# Patient Record
Sex: FEMALE | Race: WHITE | NOT HISPANIC OR LATINO | Employment: FULL TIME
[De-identification: names, ages, dates, MRNs, and addresses within clinical notes are randomized per-mention and may not be internally consistent; named-entity substitution may affect disease eponyms.]

---

## 2017-09-17 ENCOUNTER — HEALTH MAINTENANCE LETTER (OUTPATIENT)
Age: 52
End: 2017-09-17

## 2019-11-08 ENCOUNTER — HEALTH MAINTENANCE LETTER (OUTPATIENT)
Age: 54
End: 2019-11-08

## 2020-02-23 ENCOUNTER — HEALTH MAINTENANCE LETTER (OUTPATIENT)
Age: 55
End: 2020-02-23

## 2020-12-06 ENCOUNTER — HEALTH MAINTENANCE LETTER (OUTPATIENT)
Age: 55
End: 2020-12-06

## 2021-02-20 ENCOUNTER — HEALTH MAINTENANCE LETTER (OUTPATIENT)
Age: 56
End: 2021-02-20

## 2021-09-25 ENCOUNTER — HEALTH MAINTENANCE LETTER (OUTPATIENT)
Age: 56
End: 2021-09-25

## 2022-01-15 ENCOUNTER — HEALTH MAINTENANCE LETTER (OUTPATIENT)
Age: 57
End: 2022-01-15

## 2022-09-29 ENCOUNTER — ANESTHESIA EVENT (OUTPATIENT)
Dept: SURGERY | Facility: CLINIC | Age: 57
End: 2022-09-29
Payer: COMMERCIAL

## 2022-09-29 ENCOUNTER — ANESTHESIA (OUTPATIENT)
Dept: SURGERY | Facility: CLINIC | Age: 57
End: 2022-09-29
Payer: COMMERCIAL

## 2022-09-29 ENCOUNTER — APPOINTMENT (OUTPATIENT)
Dept: GENERAL RADIOLOGY | Facility: CLINIC | Age: 57
End: 2022-09-29
Attending: ORTHOPAEDIC SURGERY
Payer: COMMERCIAL

## 2022-09-29 ENCOUNTER — APPOINTMENT (OUTPATIENT)
Dept: GENERAL RADIOLOGY | Facility: CLINIC | Age: 57
End: 2022-09-29
Attending: EMERGENCY MEDICINE
Payer: COMMERCIAL

## 2022-09-29 ENCOUNTER — HOSPITAL ENCOUNTER (OUTPATIENT)
Facility: CLINIC | Age: 57
Setting detail: OBSERVATION
Discharge: HOME | End: 2022-09-30
Attending: EMERGENCY MEDICINE | Admitting: ORTHOPAEDIC SURGERY
Payer: COMMERCIAL

## 2022-09-29 ENCOUNTER — APPOINTMENT (OUTPATIENT)
Dept: CARDIOLOGY | Facility: CLINIC | Age: 57
End: 2022-09-29
Attending: PHYSICIAN ASSISTANT
Payer: COMMERCIAL

## 2022-09-29 ENCOUNTER — APPOINTMENT (OUTPATIENT)
Dept: CT IMAGING | Facility: CLINIC | Age: 57
End: 2022-09-29
Attending: PHYSICIAN ASSISTANT
Payer: COMMERCIAL

## 2022-09-29 DIAGNOSIS — E87.1 HYPONATREMIA: Primary | ICD-10-CM

## 2022-09-29 DIAGNOSIS — S82.401A TIBIA/FIBULA FRACTURE, RIGHT, CLOSED, INITIAL ENCOUNTER: ICD-10-CM

## 2022-09-29 DIAGNOSIS — S82.201A TIBIA/FIBULA FRACTURE, RIGHT, CLOSED, INITIAL ENCOUNTER: ICD-10-CM

## 2022-09-29 LAB
ANION GAP SERPL CALCULATED.3IONS-SCNC: 9 MMOL/L (ref 3–14)
BASOPHILS # BLD AUTO: 0 10E3/UL (ref 0–0.2)
BASOPHILS NFR BLD AUTO: 0 %
BUN SERPL-MCNC: 7 MG/DL (ref 7–30)
CALCIUM SERPL-MCNC: 8.6 MG/DL (ref 8.5–10.1)
CHLORIDE BLD-SCNC: 99 MMOL/L (ref 94–109)
CO2 SERPL-SCNC: 22 MMOL/L (ref 20–32)
CREAT SERPL-MCNC: 0.5 MG/DL (ref 0.52–1.04)
EOSINOPHIL # BLD AUTO: 0.1 10E3/UL (ref 0–0.7)
EOSINOPHIL NFR BLD AUTO: 1 %
ERYTHROCYTE [DISTWIDTH] IN BLOOD BY AUTOMATED COUNT: 12.8 % (ref 10–15)
GFR SERPL CREATININE-BSD FRML MDRD: >90 ML/MIN/1.73M2
GLUCOSE BLD-MCNC: 99 MG/DL (ref 70–99)
HCT VFR BLD AUTO: 33.8 % (ref 35–47)
HGB BLD-MCNC: 11.4 G/DL (ref 11.7–15.7)
IMM GRANULOCYTES # BLD: 0 10E3/UL
IMM GRANULOCYTES NFR BLD: 0 %
LVEF ECHO: NORMAL
LYMPHOCYTES # BLD AUTO: 0.3 10E3/UL (ref 0.8–5.3)
LYMPHOCYTES NFR BLD AUTO: 4 %
MCH RBC QN AUTO: 31.9 PG (ref 26.5–33)
MCHC RBC AUTO-ENTMCNC: 33.7 G/DL (ref 31.5–36.5)
MCV RBC AUTO: 95 FL (ref 78–100)
MONOCYTES # BLD AUTO: 0.7 10E3/UL (ref 0–1.3)
MONOCYTES NFR BLD AUTO: 10 %
NEUTROPHILS # BLD AUTO: 6.2 10E3/UL (ref 1.6–8.3)
NEUTROPHILS NFR BLD AUTO: 85 %
NRBC # BLD AUTO: 0 10E3/UL
NRBC BLD AUTO-RTO: 0 /100
PLATELET # BLD AUTO: 232 10E3/UL (ref 150–450)
POTASSIUM BLD-SCNC: 4 MMOL/L (ref 3.4–5.3)
RBC # BLD AUTO: 3.57 10E6/UL (ref 3.8–5.2)
SARS-COV-2 RNA RESP QL NAA+PROBE: NEGATIVE
SODIUM SERPL-SCNC: 130 MMOL/L (ref 133–144)
SODIUM SERPL-SCNC: 131 MMOL/L (ref 133–144)
WBC # BLD AUTO: 7.3 10E3/UL (ref 4–11)

## 2022-09-29 PROCEDURE — 710N000009 HC RECOVERY PHASE 1, LEVEL 1, PER MIN: Performed by: ORTHOPAEDIC SURGERY

## 2022-09-29 PROCEDURE — 250N000009 HC RX 250: Performed by: ANESTHESIOLOGY

## 2022-09-29 PROCEDURE — 258N000003 HC RX IP 258 OP 636: Performed by: NURSE ANESTHETIST, CERTIFIED REGISTERED

## 2022-09-29 PROCEDURE — 272N000001 HC OR GENERAL SUPPLY STERILE: Performed by: ORTHOPAEDIC SURGERY

## 2022-09-29 PROCEDURE — G0378 HOSPITAL OBSERVATION PER HR: HCPCS

## 2022-09-29 PROCEDURE — 250N000011 HC RX IP 250 OP 636: Performed by: PHYSICIAN ASSISTANT

## 2022-09-29 PROCEDURE — 73700 CT LOWER EXTREMITY W/O DYE: CPT | Mod: RT

## 2022-09-29 PROCEDURE — 96376 TX/PRO/DX INJ SAME DRUG ADON: CPT | Mod: XU

## 2022-09-29 PROCEDURE — 255N000002 HC RX 255 OP 636: Performed by: ORTHOPAEDIC SURGERY

## 2022-09-29 PROCEDURE — 93306 TTE W/DOPPLER COMPLETE: CPT | Mod: 26 | Performed by: INTERNAL MEDICINE

## 2022-09-29 PROCEDURE — 250N000011 HC RX IP 250 OP 636: Performed by: NURSE ANESTHETIST, CERTIFIED REGISTERED

## 2022-09-29 PROCEDURE — 999N000208 ECHOCARDIOGRAM COMPLETE

## 2022-09-29 PROCEDURE — C1713 ANCHOR/SCREW BN/BN,TIS/BN: HCPCS | Performed by: ORTHOPAEDIC SURGERY

## 2022-09-29 PROCEDURE — 258N000003 HC RX IP 258 OP 636: Performed by: PHYSICIAN ASSISTANT

## 2022-09-29 PROCEDURE — 999N000180 XR SURGERY CARM FLUORO LESS THAN 5 MIN

## 2022-09-29 PROCEDURE — 84295 ASSAY OF SERUM SODIUM: CPT | Performed by: PHYSICIAN ASSISTANT

## 2022-09-29 PROCEDURE — 36415 COLL VENOUS BLD VENIPUNCTURE: CPT | Performed by: EMERGENCY MEDICINE

## 2022-09-29 PROCEDURE — 36415 COLL VENOUS BLD VENIPUNCTURE: CPT | Performed by: PHYSICIAN ASSISTANT

## 2022-09-29 PROCEDURE — 93005 ELECTROCARDIOGRAM TRACING: CPT

## 2022-09-29 PROCEDURE — 96374 THER/PROPH/DIAG INJ IV PUSH: CPT

## 2022-09-29 PROCEDURE — 250N000011 HC RX IP 250 OP 636: Performed by: EMERGENCY MEDICINE

## 2022-09-29 PROCEDURE — 80048 BASIC METABOLIC PNL TOTAL CA: CPT | Performed by: EMERGENCY MEDICINE

## 2022-09-29 PROCEDURE — 999N000141 HC STATISTIC PRE-PROCEDURE NURSING ASSESSMENT: Performed by: ORTHOPAEDIC SURGERY

## 2022-09-29 PROCEDURE — 99285 EMERGENCY DEPT VISIT HI MDM: CPT | Mod: 25

## 2022-09-29 PROCEDURE — 250N000011 HC RX IP 250 OP 636: Performed by: ORTHOPAEDIC SURGERY

## 2022-09-29 PROCEDURE — 258N000003 HC RX IP 258 OP 636: Performed by: ANESTHESIOLOGY

## 2022-09-29 PROCEDURE — U0005 INFEC AGEN DETEC AMPLI PROBE: HCPCS | Performed by: EMERGENCY MEDICINE

## 2022-09-29 PROCEDURE — 250N000025 HC SEVOFLURANE, PER MIN: Performed by: ORTHOPAEDIC SURGERY

## 2022-09-29 PROCEDURE — 96375 TX/PRO/DX INJ NEW DRUG ADDON: CPT | Mod: XU

## 2022-09-29 PROCEDURE — 73590 X-RAY EXAM OF LOWER LEG: CPT | Mod: RT

## 2022-09-29 PROCEDURE — 250N000009 HC RX 250: Performed by: NURSE ANESTHETIST, CERTIFIED REGISTERED

## 2022-09-29 PROCEDURE — 250N000013 HC RX MED GY IP 250 OP 250 PS 637: Performed by: PHYSICIAN ASSISTANT

## 2022-09-29 PROCEDURE — 250N000013 HC RX MED GY IP 250 OP 250 PS 637: Performed by: ORTHOPAEDIC SURGERY

## 2022-09-29 PROCEDURE — 250N000011 HC RX IP 250 OP 636: Performed by: ANESTHESIOLOGY

## 2022-09-29 PROCEDURE — 360N000084 HC SURGERY LEVEL 4 W/ FLUORO, PER MIN: Performed by: ORTHOPAEDIC SURGERY

## 2022-09-29 PROCEDURE — 99219 PR INITIAL OBSERVATION CARE,LEVEL II: CPT | Performed by: PHYSICIAN ASSISTANT

## 2022-09-29 PROCEDURE — 250N000009 HC RX 250: Performed by: ORTHOPAEDIC SURGERY

## 2022-09-29 PROCEDURE — 85025 COMPLETE CBC W/AUTO DIFF WBC: CPT | Performed by: EMERGENCY MEDICINE

## 2022-09-29 PROCEDURE — C9803 HOPD COVID-19 SPEC COLLECT: HCPCS

## 2022-09-29 PROCEDURE — 250N000009 HC RX 250: Performed by: PHYSICIAN ASSISTANT

## 2022-09-29 PROCEDURE — 370N000017 HC ANESTHESIA TECHNICAL FEE, PER MIN: Performed by: ORTHOPAEDIC SURGERY

## 2022-09-29 DEVICE — IMPLANTABLE DEVICE
Type: IMPLANTABLE DEVICE | Site: LEG | Status: NON-FUNCTIONAL
Removed: 2023-03-13

## 2022-09-29 DEVICE — IMP SCR SYN CORTEX 3.5X40MM SELF TAP SS 204.840
Type: IMPLANTABLE DEVICE | Site: LEG | Status: NON-FUNCTIONAL
Removed: 2023-03-13

## 2022-09-29 DEVICE — IMP SCR SYN CORTEX 3.5X28MM SELF TAP SS 204.828
Type: IMPLANTABLE DEVICE | Site: LEG | Status: NON-FUNCTIONAL
Removed: 2023-03-13

## 2022-09-29 DEVICE — IMP SCR SYN CORTEX 3.5X16MM SELF TAP SS 204.816
Type: IMPLANTABLE DEVICE | Site: LEG | Status: NON-FUNCTIONAL
Removed: 2023-03-13

## 2022-09-29 DEVICE — IMP SCR SYN CORTEX 3.5X24MM SELF TAP SS 204.824
Type: IMPLANTABLE DEVICE | Site: LEG | Status: NON-FUNCTIONAL
Removed: 2023-03-13

## 2022-09-29 DEVICE — SCREW STARDRIVE W/T15 3.5X28MM
Type: IMPLANTABLE DEVICE | Site: LEG | Status: NON-FUNCTIONAL
Removed: 2023-03-13

## 2022-09-29 DEVICE — SCREW LOCKING ST 2.7X40MM
Type: IMPLANTABLE DEVICE | Site: LEG | Status: NON-FUNCTIONAL
Removed: 2023-03-13

## 2022-09-29 DEVICE — IMP WIRE KIRSCHNER 2.0X150MM 292.20: Type: IMPLANTABLE DEVICE | Site: LEG | Status: FUNCTIONAL

## 2022-09-29 DEVICE — IMP SCR SYN 2.7X34MM T8 SD LOCKING SELF-TAP VA 02.211.034
Type: IMPLANTABLE DEVICE | Site: LEG | Status: NON-FUNCTIONAL
Removed: 2023-03-13

## 2022-09-29 DEVICE — IMP PLATE SYN LCP LAT DIST FIB 2.7/3.5X86MM 4H RT 02.112.138
Type: IMPLANTABLE DEVICE | Site: LEG | Status: NON-FUNCTIONAL
Removed: 2023-03-13

## 2022-09-29 DEVICE — IMP SCR SYN CORTEX 3.5X32MM SELF TAP SS 204.832
Type: IMPLANTABLE DEVICE | Site: LEG | Status: NON-FUNCTIONAL
Removed: 2023-03-13

## 2022-09-29 DEVICE — IMP SCR SYN 2.7X20MM T8 SD LOCKING SELF-TAP VA 02.211.020
Type: IMPLANTABLE DEVICE | Site: LEG | Status: NON-FUNCTIONAL
Removed: 2023-03-13

## 2022-09-29 DEVICE — IMP SCR SYN CORTEX 3.5X26MM SELF TAP SS 204.826
Type: IMPLANTABLE DEVICE | Site: LEG | Status: NON-FUNCTIONAL
Removed: 2023-03-13

## 2022-09-29 DEVICE — IMP SCR SYN 2.7X18MM T8 SD LOCKING SELF-TAP VA 02.211.018
Type: IMPLANTABLE DEVICE | Site: LEG | Status: NON-FUNCTIONAL
Removed: 2023-03-13

## 2022-09-29 DEVICE — SCREW LOCKING ST 2.7X38MM
Type: IMPLANTABLE DEVICE | Site: LEG | Status: NON-FUNCTIONAL
Removed: 2023-03-13

## 2022-09-29 DEVICE — IMP SCR SYN CORTEX 3.5X18MM SELF TAP SS 204.818
Type: IMPLANTABLE DEVICE | Site: LEG | Status: NON-FUNCTIONAL
Removed: 2023-03-13

## 2022-09-29 RX ORDER — CEFAZOLIN SODIUM/WATER 2 G/20 ML
2 SYRINGE (ML) INTRAVENOUS
Status: COMPLETED | OUTPATIENT
Start: 2022-09-29 | End: 2022-09-29

## 2022-09-29 RX ORDER — NALOXONE HYDROCHLORIDE 0.4 MG/ML
0.2 INJECTION, SOLUTION INTRAMUSCULAR; INTRAVENOUS; SUBCUTANEOUS
Status: DISCONTINUED | OUTPATIENT
Start: 2022-09-29 | End: 2022-09-29

## 2022-09-29 RX ORDER — ENOXAPARIN SODIUM 100 MG/ML
40 INJECTION SUBCUTANEOUS EVERY 24 HOURS
Status: DISCONTINUED | OUTPATIENT
Start: 2022-09-30 | End: 2022-09-30 | Stop reason: HOSPADM

## 2022-09-29 RX ORDER — ONDANSETRON 2 MG/ML
4 INJECTION INTRAMUSCULAR; INTRAVENOUS EVERY 30 MIN PRN
Status: DISCONTINUED | OUTPATIENT
Start: 2022-09-29 | End: 2022-09-29 | Stop reason: HOSPADM

## 2022-09-29 RX ORDER — PROCHLORPERAZINE 25 MG
25 SUPPOSITORY, RECTAL RECTAL EVERY 12 HOURS PRN
Status: DISCONTINUED | OUTPATIENT
Start: 2022-09-29 | End: 2022-09-29

## 2022-09-29 RX ORDER — SODIUM CHLORIDE, SODIUM LACTATE, POTASSIUM CHLORIDE, CALCIUM CHLORIDE 600; 310; 30; 20 MG/100ML; MG/100ML; MG/100ML; MG/100ML
INJECTION, SOLUTION INTRAVENOUS CONTINUOUS
Status: DISCONTINUED | OUTPATIENT
Start: 2022-09-29 | End: 2022-09-29 | Stop reason: HOSPADM

## 2022-09-29 RX ORDER — LIDOCAINE 40 MG/G
CREAM TOPICAL
Status: DISCONTINUED | OUTPATIENT
Start: 2022-09-29 | End: 2022-09-30 | Stop reason: HOSPADM

## 2022-09-29 RX ORDER — LIDOCAINE 40 MG/G
CREAM TOPICAL
Status: DISCONTINUED | OUTPATIENT
Start: 2022-09-29 | End: 2022-09-29 | Stop reason: HOSPADM

## 2022-09-29 RX ORDER — HYDROXYZINE HYDROCHLORIDE 25 MG/1
25 TABLET, FILM COATED ORAL EVERY 6 HOURS PRN
Status: DISCONTINUED | OUTPATIENT
Start: 2022-09-29 | End: 2022-09-30 | Stop reason: HOSPADM

## 2022-09-29 RX ORDER — DEXAMETHASONE SODIUM PHOSPHATE 4 MG/ML
INJECTION, SOLUTION INTRA-ARTICULAR; INTRALESIONAL; INTRAMUSCULAR; INTRAVENOUS; SOFT TISSUE PRN
Status: DISCONTINUED | OUTPATIENT
Start: 2022-09-29 | End: 2022-09-29

## 2022-09-29 RX ORDER — VECURONIUM BROMIDE 1 MG/ML
INJECTION, POWDER, LYOPHILIZED, FOR SOLUTION INTRAVENOUS PRN
Status: DISCONTINUED | OUTPATIENT
Start: 2022-09-29 | End: 2022-09-29

## 2022-09-29 RX ORDER — PROPOFOL 10 MG/ML
INJECTION, EMULSION INTRAVENOUS PRN
Status: DISCONTINUED | OUTPATIENT
Start: 2022-09-29 | End: 2022-09-29

## 2022-09-29 RX ORDER — FENTANYL CITRATE 0.05 MG/ML
25 INJECTION, SOLUTION INTRAMUSCULAR; INTRAVENOUS EVERY 5 MIN PRN
Status: DISCONTINUED | OUTPATIENT
Start: 2022-09-29 | End: 2022-09-29 | Stop reason: HOSPADM

## 2022-09-29 RX ORDER — NALOXONE HYDROCHLORIDE 0.4 MG/ML
0.4 INJECTION, SOLUTION INTRAMUSCULAR; INTRAVENOUS; SUBCUTANEOUS
Status: DISCONTINUED | OUTPATIENT
Start: 2022-09-29 | End: 2022-09-29

## 2022-09-29 RX ORDER — PROCHLORPERAZINE MALEATE 5 MG
10 TABLET ORAL EVERY 6 HOURS PRN
Status: DISCONTINUED | OUTPATIENT
Start: 2022-09-29 | End: 2022-09-30 | Stop reason: HOSPADM

## 2022-09-29 RX ORDER — AMOXICILLIN 250 MG
1 CAPSULE ORAL 2 TIMES DAILY
Status: DISCONTINUED | OUTPATIENT
Start: 2022-09-29 | End: 2022-09-30 | Stop reason: HOSPADM

## 2022-09-29 RX ORDER — ONDANSETRON 2 MG/ML
INJECTION INTRAMUSCULAR; INTRAVENOUS PRN
Status: DISCONTINUED | OUTPATIENT
Start: 2022-09-29 | End: 2022-09-29

## 2022-09-29 RX ORDER — OXYCODONE HYDROCHLORIDE 5 MG/1
5 TABLET ORAL EVERY 4 HOURS PRN
Status: DISCONTINUED | OUTPATIENT
Start: 2022-09-29 | End: 2022-09-29 | Stop reason: HOSPADM

## 2022-09-29 RX ORDER — ACETAMINOPHEN 325 MG/1
650 TABLET ORAL EVERY 4 HOURS PRN
Status: DISCONTINUED | OUTPATIENT
Start: 2022-10-02 | End: 2022-09-30 | Stop reason: HOSPADM

## 2022-09-29 RX ORDER — OXYCODONE HYDROCHLORIDE 5 MG/1
10 TABLET ORAL EVERY 4 HOURS PRN
Status: DISCONTINUED | OUTPATIENT
Start: 2022-09-29 | End: 2022-09-30

## 2022-09-29 RX ORDER — TRANEXAMIC ACID 10 MG/ML
1 INJECTION, SOLUTION INTRAVENOUS ONCE
Status: COMPLETED | OUTPATIENT
Start: 2022-09-29 | End: 2022-09-29

## 2022-09-29 RX ORDER — HYDROMORPHONE HCL IN WATER/PF 6 MG/30 ML
0.2 PATIENT CONTROLLED ANALGESIA SYRINGE INTRAVENOUS EVERY 5 MIN PRN
Status: DISCONTINUED | OUTPATIENT
Start: 2022-09-29 | End: 2022-09-29 | Stop reason: HOSPADM

## 2022-09-29 RX ORDER — ACETAMINOPHEN 325 MG/1
975 TABLET ORAL EVERY 8 HOURS
Status: DISCONTINUED | OUTPATIENT
Start: 2022-09-29 | End: 2022-09-30 | Stop reason: HOSPADM

## 2022-09-29 RX ORDER — DIPHENHYDRAMINE HCL 25 MG
25 CAPSULE ORAL EVERY 6 HOURS PRN
Status: DISCONTINUED | OUTPATIENT
Start: 2022-09-29 | End: 2022-09-30 | Stop reason: HOSPADM

## 2022-09-29 RX ORDER — SODIUM CHLORIDE 9 MG/ML
INJECTION, SOLUTION INTRAVENOUS CONTINUOUS PRN
Status: DISCONTINUED | OUTPATIENT
Start: 2022-09-29 | End: 2022-09-29

## 2022-09-29 RX ORDER — HYDROMORPHONE HCL IN WATER/PF 6 MG/30 ML
0.2 PATIENT CONTROLLED ANALGESIA SYRINGE INTRAVENOUS
Status: DISCONTINUED | OUTPATIENT
Start: 2022-09-29 | End: 2022-09-30 | Stop reason: HOSPADM

## 2022-09-29 RX ORDER — SODIUM CHLORIDE 9 MG/ML
INJECTION, SOLUTION INTRAVENOUS CONTINUOUS
Status: DISCONTINUED | OUTPATIENT
Start: 2022-09-29 | End: 2022-09-29

## 2022-09-29 RX ORDER — NALOXONE HYDROCHLORIDE 0.4 MG/ML
0.2 INJECTION, SOLUTION INTRAMUSCULAR; INTRAVENOUS; SUBCUTANEOUS
Status: DISCONTINUED | OUTPATIENT
Start: 2022-09-29 | End: 2022-09-30 | Stop reason: HOSPADM

## 2022-09-29 RX ORDER — AMOXICILLIN 250 MG
2 CAPSULE ORAL 2 TIMES DAILY PRN
Status: DISCONTINUED | OUTPATIENT
Start: 2022-09-29 | End: 2022-09-29

## 2022-09-29 RX ORDER — CEFAZOLIN SODIUM 1 G/3ML
1 INJECTION, POWDER, FOR SOLUTION INTRAMUSCULAR; INTRAVENOUS EVERY 8 HOURS
Status: COMPLETED | OUTPATIENT
Start: 2022-09-30 | End: 2022-09-30

## 2022-09-29 RX ORDER — GLYCOPYRROLATE 0.2 MG/ML
INJECTION, SOLUTION INTRAMUSCULAR; INTRAVENOUS PRN
Status: DISCONTINUED | OUTPATIENT
Start: 2022-09-29 | End: 2022-09-29

## 2022-09-29 RX ORDER — OXYCODONE HYDROCHLORIDE 5 MG/1
5 TABLET ORAL EVERY 4 HOURS PRN
Status: DISCONTINUED | OUTPATIENT
Start: 2022-09-29 | End: 2022-09-30

## 2022-09-29 RX ORDER — NEOSTIGMINE METHYLSULFATE 1 MG/ML
VIAL (ML) INJECTION PRN
Status: DISCONTINUED | OUTPATIENT
Start: 2022-09-29 | End: 2022-09-29

## 2022-09-29 RX ORDER — HYDROMORPHONE HYDROCHLORIDE 1 MG/ML
INJECTION, SOLUTION INTRAMUSCULAR; INTRAVENOUS; SUBCUTANEOUS PRN
Status: DISCONTINUED | OUTPATIENT
Start: 2022-09-29 | End: 2022-09-29

## 2022-09-29 RX ORDER — SODIUM CHLORIDE, SODIUM LACTATE, POTASSIUM CHLORIDE, CALCIUM CHLORIDE 600; 310; 30; 20 MG/100ML; MG/100ML; MG/100ML; MG/100ML
INJECTION, SOLUTION INTRAVENOUS CONTINUOUS
Status: DISCONTINUED | OUTPATIENT
Start: 2022-09-29 | End: 2022-09-30

## 2022-09-29 RX ORDER — FAMOTIDINE 20 MG/1
20 TABLET, FILM COATED ORAL 2 TIMES DAILY
Status: DISCONTINUED | OUTPATIENT
Start: 2022-09-29 | End: 2022-09-30 | Stop reason: HOSPADM

## 2022-09-29 RX ORDER — HYDROMORPHONE HCL IN WATER/PF 6 MG/30 ML
0.4 PATIENT CONTROLLED ANALGESIA SYRINGE INTRAVENOUS
Status: DISCONTINUED | OUTPATIENT
Start: 2022-09-29 | End: 2022-09-30 | Stop reason: HOSPADM

## 2022-09-29 RX ORDER — ONDANSETRON 4 MG/1
4 TABLET, ORALLY DISINTEGRATING ORAL EVERY 6 HOURS PRN
Status: DISCONTINUED | OUTPATIENT
Start: 2022-09-29 | End: 2022-09-30 | Stop reason: HOSPADM

## 2022-09-29 RX ORDER — MORPHINE SULFATE 4 MG/ML
4 INJECTION, SOLUTION INTRAMUSCULAR; INTRAVENOUS ONCE
Status: COMPLETED | OUTPATIENT
Start: 2022-09-29 | End: 2022-09-29

## 2022-09-29 RX ORDER — HYDROMORPHONE HCL IN WATER/PF 6 MG/30 ML
0.2 PATIENT CONTROLLED ANALGESIA SYRINGE INTRAVENOUS
Status: DISCONTINUED | OUTPATIENT
Start: 2022-09-29 | End: 2022-09-29

## 2022-09-29 RX ORDER — AMOXICILLIN 250 MG
1 CAPSULE ORAL 2 TIMES DAILY PRN
Status: DISCONTINUED | OUTPATIENT
Start: 2022-09-29 | End: 2022-09-29

## 2022-09-29 RX ORDER — ONDANSETRON 4 MG/1
4 TABLET, ORALLY DISINTEGRATING ORAL EVERY 6 HOURS PRN
Status: DISCONTINUED | OUTPATIENT
Start: 2022-09-29 | End: 2022-09-29

## 2022-09-29 RX ORDER — POLYETHYLENE GLYCOL 3350 17 G/17G
17 POWDER, FOR SOLUTION ORAL DAILY
Status: DISCONTINUED | OUTPATIENT
Start: 2022-09-30 | End: 2022-09-30 | Stop reason: HOSPADM

## 2022-09-29 RX ORDER — NALOXONE HYDROCHLORIDE 0.4 MG/ML
0.4 INJECTION, SOLUTION INTRAMUSCULAR; INTRAVENOUS; SUBCUTANEOUS
Status: DISCONTINUED | OUTPATIENT
Start: 2022-09-29 | End: 2022-09-30 | Stop reason: HOSPADM

## 2022-09-29 RX ORDER — LIDOCAINE 40 MG/G
CREAM TOPICAL
Status: DISCONTINUED | OUTPATIENT
Start: 2022-09-29 | End: 2022-09-29

## 2022-09-29 RX ORDER — ONDANSETRON 2 MG/ML
4 INJECTION INTRAMUSCULAR; INTRAVENOUS EVERY 6 HOURS PRN
Status: DISCONTINUED | OUTPATIENT
Start: 2022-09-29 | End: 2022-09-30 | Stop reason: HOSPADM

## 2022-09-29 RX ORDER — CHLORAL HYDRATE 500 MG
1 CAPSULE ORAL DAILY
COMMUNITY

## 2022-09-29 RX ORDER — BISACODYL 10 MG
10 SUPPOSITORY, RECTAL RECTAL DAILY PRN
Status: DISCONTINUED | OUTPATIENT
Start: 2022-09-29 | End: 2022-09-30 | Stop reason: HOSPADM

## 2022-09-29 RX ORDER — PROCHLORPERAZINE MALEATE 5 MG
10 TABLET ORAL EVERY 6 HOURS PRN
Status: DISCONTINUED | OUTPATIENT
Start: 2022-09-29 | End: 2022-09-29

## 2022-09-29 RX ORDER — EPHEDRINE SULFATE 50 MG/ML
INJECTION, SOLUTION INTRAMUSCULAR; INTRAVENOUS; SUBCUTANEOUS PRN
Status: DISCONTINUED | OUTPATIENT
Start: 2022-09-29 | End: 2022-09-29

## 2022-09-29 RX ORDER — HYDROMORPHONE HYDROCHLORIDE 1 MG/ML
0.5 INJECTION, SOLUTION INTRAMUSCULAR; INTRAVENOUS; SUBCUTANEOUS EVERY 10 MIN PRN
Status: DISCONTINUED | OUTPATIENT
Start: 2022-09-29 | End: 2022-09-29

## 2022-09-29 RX ORDER — OXYCODONE HYDROCHLORIDE 5 MG/1
5 TABLET ORAL EVERY 4 HOURS PRN
Status: DISCONTINUED | OUTPATIENT
Start: 2022-09-29 | End: 2022-09-29

## 2022-09-29 RX ORDER — MAGNESIUM HYDROXIDE 1200 MG/15ML
LIQUID ORAL PRN
Status: DISCONTINUED | OUTPATIENT
Start: 2022-09-29 | End: 2022-09-29 | Stop reason: HOSPADM

## 2022-09-29 RX ORDER — CEFAZOLIN SODIUM/WATER 2 G/20 ML
2 SYRINGE (ML) INTRAVENOUS SEE ADMIN INSTRUCTIONS
Status: DISCONTINUED | OUTPATIENT
Start: 2022-09-29 | End: 2022-09-29 | Stop reason: HOSPADM

## 2022-09-29 RX ORDER — ONDANSETRON 2 MG/ML
4 INJECTION INTRAMUSCULAR; INTRAVENOUS EVERY 6 HOURS PRN
Status: DISCONTINUED | OUTPATIENT
Start: 2022-09-29 | End: 2022-09-29

## 2022-09-29 RX ORDER — ONDANSETRON 4 MG/1
4 TABLET, ORALLY DISINTEGRATING ORAL EVERY 30 MIN PRN
Status: DISCONTINUED | OUTPATIENT
Start: 2022-09-29 | End: 2022-09-29 | Stop reason: HOSPADM

## 2022-09-29 RX ORDER — MULTIVITAMIN,THERAPEUTIC
1 TABLET ORAL DAILY
COMMUNITY

## 2022-09-29 RX ORDER — FENTANYL CITRATE 50 UG/ML
INJECTION, SOLUTION INTRAMUSCULAR; INTRAVENOUS PRN
Status: DISCONTINUED | OUTPATIENT
Start: 2022-09-29 | End: 2022-09-29

## 2022-09-29 RX ORDER — ACETAMINOPHEN 325 MG/1
975 TABLET ORAL EVERY 8 HOURS
Status: DISCONTINUED | OUTPATIENT
Start: 2022-09-29 | End: 2022-09-29

## 2022-09-29 RX ORDER — FENTANYL CITRATE 0.05 MG/ML
50 INJECTION, SOLUTION INTRAMUSCULAR; INTRAVENOUS
Status: COMPLETED | OUTPATIENT
Start: 2022-09-29 | End: 2022-09-29

## 2022-09-29 RX ORDER — LIDOCAINE HYDROCHLORIDE 20 MG/ML
INJECTION, SOLUTION INFILTRATION; PERINEURAL PRN
Status: DISCONTINUED | OUTPATIENT
Start: 2022-09-29 | End: 2022-09-29

## 2022-09-29 RX ADMIN — Medication 2 G: at 17:45

## 2022-09-29 RX ADMIN — SODIUM CHLORIDE, POTASSIUM CHLORIDE, SODIUM LACTATE AND CALCIUM CHLORIDE: 600; 310; 30; 20 INJECTION, SOLUTION INTRAVENOUS at 19:41

## 2022-09-29 RX ADMIN — PROPOFOL 200 MG: 10 INJECTION, EMULSION INTRAVENOUS at 17:52

## 2022-09-29 RX ADMIN — TRANEXAMIC ACID 1 G: 10 INJECTION, SOLUTION INTRAVENOUS at 19:28

## 2022-09-29 RX ADMIN — HUMAN ALBUMIN MICROSPHERES AND PERFLUTREN 3 ML: 10; .22 INJECTION, SOLUTION INTRAVENOUS at 14:26

## 2022-09-29 RX ADMIN — FENTANYL CITRATE 25 MCG: 50 INJECTION, SOLUTION INTRAMUSCULAR; INTRAVENOUS at 18:54

## 2022-09-29 RX ADMIN — ONDANSETRON 4 MG: 2 INJECTION INTRAMUSCULAR; INTRAVENOUS at 19:25

## 2022-09-29 RX ADMIN — SODIUM CHLORIDE: 9 INJECTION, SOLUTION INTRAVENOUS at 13:01

## 2022-09-29 RX ADMIN — HYDROMORPHONE HYDROCHLORIDE 0.2 MG: 0.2 INJECTION, SOLUTION INTRAMUSCULAR; INTRAVENOUS; SUBCUTANEOUS at 12:58

## 2022-09-29 RX ADMIN — FAMOTIDINE 20 MG: 20 TABLET ORAL at 22:04

## 2022-09-29 RX ADMIN — DEXAMETHASONE SODIUM PHOSPHATE 4 MG: 4 INJECTION, SOLUTION INTRA-ARTICULAR; INTRALESIONAL; INTRAMUSCULAR; INTRAVENOUS; SOFT TISSUE at 18:13

## 2022-09-29 RX ADMIN — FENTANYL CITRATE 50 MCG: 50 INJECTION, SOLUTION INTRAMUSCULAR; INTRAVENOUS at 16:51

## 2022-09-29 RX ADMIN — FENTANYL CITRATE 25 MCG: 50 INJECTION, SOLUTION INTRAMUSCULAR; INTRAVENOUS at 18:49

## 2022-09-29 RX ADMIN — PHENYLEPHRINE HYDROCHLORIDE 100 MCG: 10 INJECTION INTRAVENOUS at 18:31

## 2022-09-29 RX ADMIN — LIDOCAINE HYDROCHLORIDE 100 MG: 20 INJECTION, SOLUTION INFILTRATION; PERINEURAL at 17:52

## 2022-09-29 RX ADMIN — ROCURONIUM BROMIDE 50 MG: 50 INJECTION, SOLUTION INTRAVENOUS at 17:52

## 2022-09-29 RX ADMIN — HYDROMORPHONE HYDROCHLORIDE 0.5 MG: 1 INJECTION, SOLUTION INTRAMUSCULAR; INTRAVENOUS; SUBCUTANEOUS at 19:59

## 2022-09-29 RX ADMIN — NEOSTIGMINE METHYLSULFATE 3 MG: 1 INJECTION, SOLUTION INTRAVENOUS at 19:28

## 2022-09-29 RX ADMIN — TRANEXAMIC ACID 1 G: 10 INJECTION, SOLUTION INTRAVENOUS at 18:02

## 2022-09-29 RX ADMIN — PHENYLEPHRINE HYDROCHLORIDE 50 MCG: 10 INJECTION INTRAVENOUS at 18:13

## 2022-09-29 RX ADMIN — SODIUM CHLORIDE: 9 INJECTION, SOLUTION INTRAVENOUS at 18:17

## 2022-09-29 RX ADMIN — MORPHINE SULFATE 4 MG: 4 INJECTION, SOLUTION INTRAMUSCULAR; INTRAVENOUS at 07:19

## 2022-09-29 RX ADMIN — HYDROMORPHONE HYDROCHLORIDE 0.5 MG: 1 INJECTION, SOLUTION INTRAMUSCULAR; INTRAVENOUS; SUBCUTANEOUS at 10:56

## 2022-09-29 RX ADMIN — SODIUM CHLORIDE, POTASSIUM CHLORIDE, SODIUM LACTATE AND CALCIUM CHLORIDE: 600; 310; 30; 20 INJECTION, SOLUTION INTRAVENOUS at 16:31

## 2022-09-29 RX ADMIN — HYDROMORPHONE HYDROCHLORIDE 0.5 MG: 1 INJECTION, SOLUTION INTRAMUSCULAR; INTRAVENOUS; SUBCUTANEOUS at 09:03

## 2022-09-29 RX ADMIN — MIDAZOLAM 2 MG: 1 INJECTION INTRAMUSCULAR; INTRAVENOUS at 17:39

## 2022-09-29 RX ADMIN — BUPIVACAINE HYDROCHLORIDE 15 ML: 5 INJECTION, SOLUTION EPIDURAL; INTRACAUDAL at 17:01

## 2022-09-29 RX ADMIN — PHENYLEPHRINE HYDROCHLORIDE 100 MCG: 10 INJECTION INTRAVENOUS at 18:25

## 2022-09-29 RX ADMIN — PHENYLEPHRINE HYDROCHLORIDE 100 MCG: 10 INJECTION INTRAVENOUS at 18:21

## 2022-09-29 RX ADMIN — Medication 10 MG: at 18:43

## 2022-09-29 RX ADMIN — OXYCODONE HYDROCHLORIDE 5 MG: 5 TABLET ORAL at 15:43

## 2022-09-29 RX ADMIN — GLYCOPYRROLATE 0.6 MG: 0.2 INJECTION, SOLUTION INTRAMUSCULAR; INTRAVENOUS at 19:28

## 2022-09-29 RX ADMIN — MIDAZOLAM HYDROCHLORIDE 2 MG: 1 INJECTION, SOLUTION INTRAMUSCULAR; INTRAVENOUS at 16:51

## 2022-09-29 RX ADMIN — HYDROMORPHONE HYDROCHLORIDE 0.2 MG: 0.2 INJECTION, SOLUTION INTRAMUSCULAR; INTRAVENOUS; SUBCUTANEOUS at 15:06

## 2022-09-29 RX ADMIN — VECURONIUM BROMIDE 2 MG: 1 INJECTION, POWDER, LYOPHILIZED, FOR SOLUTION INTRAVENOUS at 18:21

## 2022-09-29 RX ADMIN — FENTANYL CITRATE 50 MCG: 50 INJECTION, SOLUTION INTRAMUSCULAR; INTRAVENOUS at 17:52

## 2022-09-29 RX ADMIN — HYDROMORPHONE HYDROCHLORIDE 0.4 MG: 0.2 INJECTION, SOLUTION INTRAMUSCULAR; INTRAVENOUS; SUBCUTANEOUS at 22:04

## 2022-09-29 ASSESSMENT — LIFESTYLE VARIABLES: TOBACCO_USE: 1

## 2022-09-29 ASSESSMENT — ACTIVITIES OF DAILY LIVING (ADL)
ADLS_ACUITY_SCORE: 35
ADLS_ACUITY_SCORE: 31
ADLS_ACUITY_SCORE: 35
ADLS_ACUITY_SCORE: 31
ADLS_ACUITY_SCORE: 31

## 2022-09-29 ASSESSMENT — ENCOUNTER SYMPTOMS: ARTHRALGIAS: 1

## 2022-09-29 NOTE — CONSULTS
[facility]  Consult Note - Hospitalist Service     Date of Admission:  9/29/2022  Consult Requested by: Adelaide Nobles PA-C  Reason for Consult: Surgical clearance  PRIMARY CARE PROVIDER:    No Ref-Primary, Physician    Assessment & Plan   Leanne Valentino is a 57 year old female admitted on 9/29/2022.    Past medical history significant for History of herniated lumbar disc, MDD, History of Leiomyoma with Dysmenorrhea and Menorrhagia who was admitted under Orthopedic service due to right distal tibia/fibula fractures.      Patient presented to [facility] on 9/29/2022 due to a right leg injury.  Patient reported that she tripped over her dog the previous night after going to the bathroom.  Patient was evaluated in the ED by Dr. Blackwood.  Evaluation included a BMP that revealed a sodium of 130, creatinine of 0.5 with a GFR greater than 90 otherwise within normal limits.  A CBC with differential revealed a WBC of 7.3, hemoglobin of 11.4, hematocrit of 33.8, platelets of 232, RBC of 3.57, absolute lymphocytes of 0.3 otherwise within normal limits.  COVID-19 PCR was negative.  To review x-ray of the right tibia and fibula revealed acute mildly displaced oblique fracture through the distal tibia with likely intra-articular extension and also with an acute oblique mild to moderately displaced fracture of the distal fibula with recommendations for ankle radiography to better assess the tibial talar articulation (moderate soft tissue edema was noted throughout the lower extremity).  On-call orthopedic surgery was contacted and recommended admission to the hospital.  Patient received 4 mg of IV morphine seen and 0.5 mg of IV Dilaudid.  Orthopedics ordered a right lower extremity tibia-fibula CT without contrast that revealed spiral distal tibial shaft fracture with mild displacement, comminuted distal fibular/lateral malleolus fracture with extension into the inferior lateral ankle syndesmosis  and small nondisplaced posterior malleolus fracture with extension into the tibiotalar joint.    Hospitalist were consulted to assist with preoperative clearance.      Mechanical fall  Traumatic acute right distal tibia/fibula fracture  Normally woks out 3-4 times per week (low intensity cardio).  - Orthopedic Surgery is managing.   --Defer analgesic management, DVT prophylaxis, PT/OT.     - Encourage utilization of incentive spirometer.   Pre-operative clearance:   --EKG: Sinus rhythm without ischemic changes and noted inverted T-wave in V1.     --Soft systolic murmur appreciated (new to patient):  Will get ECHO.     --If ECHO is OK; patient is low risk to proceed with surgery.     --Discussed getting ECHO and postponing surgery with Ortho.      ADDENDUM:  Heart murmur  *ECHO completed and without wall motion abnormalities and LV function normal with EF of 65-70%.  No aortic stenosis present and mild aortic sclerosis possible.  Trace TR present.  --Discussed with Ortho and patient.      Hyponatremia  - IV Fluids with NS at 100 ml/hr.    - Check Na this afternoon and repeat BMP in the morning.      Mild anemia, normocytic  HGB at 11.4 upon admission.  No recent HGB's to compare with.    - Monitor.      Tobacco use D/O  Currently smoking 1 p/d.    - Encouraged cessation.    - Nicoderm patch offered and patient declined.      History of herniated lumbar disc  Noted per EMR and s/p L4-L5 laminectomy/discectomy, currently not on any medications    History of MDD  Not currently on any medications but noted on EMR.      History of Leiomyoma with Dysmenorrhea and Menorrhagia  Noted per EMR and s/p leiomomectomy and no interventions at this time.        Clinically Significant Risk Factors Present on Admission         # Hyponatremia: Na = 130 mmol/L (Ref range: 133 - 144 mmol/L) on admission, will monitor as appropriate                    Diet: NPO per Anesthesia Guidelines for Procedure/Surgery Except for: Meds, Ice Chips      DVT Prophylaxis: Defer to primary service   Lara Catheter: Not present  Central Lines: None  Cardiac Monitoring: None  Code Status: Full Code; confirmed with the patient.      Disposition Plan    Per Ortho.         The patient's care was discussed with the Patient, Patient's Family and Primary team.    The patient has been discussed with Dr. Rodriguez, who agrees with the assessment and plan at this time.    At this time, I'd like to thank Adelaide Nobles PA-C for consulting the Hospitalist service.  We will continue to follow.        Kilo Mcadams PA-C  [facility]  Securely message with the Vocera Web Console (learn more here)  Text page via [facility] Paging/Directory    ______________________________________________________________________    Chief Complaint   Right leg injury after a mechanical fall    History is obtained from the patient and EMR.      History of Present Illness   Leanne Valentino is a 57 year old female with a past medical history significant for History of herniated lumbar disc, MDD, History of Leiomyoma with Dysmenorrhea and Menorrhagia who was admitted under Orthopedic service due to right distal tibia/fibula fractures.      Patient presented to [facility] on 9/29/2022 due to a right leg injury.  Patient reported that she tripped over her dog the previous night after going to the bathroom.  Patient was evaluated in the ED by Dr. Blackwood.  Evaluation included a BMP that revealed a sodium of 130, creatinine of 0.5 with a GFR greater than 90 otherwise within normal limits.  A CBC with differential revealed a WBC of 7.3, hemoglobin of 11.4, hematocrit of 33.8, platelets of 232, RBC of 3.57, absolute lymphocytes of 0.3 otherwise within normal limits.  COVID-19 PCR was negative.  To review x-ray of the right tibia and fibula revealed acute mildly displaced oblique fracture through the distal tibia with likely intra-articular extension and also with an acute  oblique mild to moderately displaced fracture of the distal fibula with recommendations for ankle radiography to better assess the tibial talar articulation (moderate soft tissue edema was noted throughout the lower extremity).  On-call orthopedic surgery was contacted and recommended admission to the hospital.  Patient received 4 mg of IV morphine seen and 0.5 mg of IV Dilaudid.  Orthopedics ordered a right lower extremity tibia-fibula CT without contrast that revealed spiral distal tibial shaft fracture with mild displacement, comminuted distal fibular/lateral malleolus fracture with extension into the inferior lateral ankle syndesmosis and small nondisplaced posterior malleolus fracture with extension into the tibiotalar joint.    Hospitalist were consulted to assist with preoperative clearance.      Patient was seen in her hospital room with her mother present in the room.  Initially, we reviewed her medical and surgical history.  We reviewed events that led to her coming into the hospital.  She has been in her normal state of health and only now has swelling of her rigth leg/ankle and pain at that same place.      She has had surgery in the past.  She currently works out (low impact, cardio exercises) 3-4 times a week.  She currently smokes up to 1 pack per day and declined Nicoderm patch when offered.      Discussed/reviewed plans with the patient and also with Ortho.    Review of Systems   The 10 point Review of Systems is negative other than noted in the HPI.    Past Medical History    I have reviewed this patient's medical history and updated it with pertinent information if needed.   Past Medical History:   Diagnosis Date     Adjustment disorder with mixed anxiety and depressed mood      Leiomyoma of uterus, unspecified    History of herniated lumbar disc, MDD, History of Leiomyoma with Dysmenorrhea and Menorrhagia     Past Surgical History   I have reviewed this patient's surgical history and updated it  with pertinent information if needed.  Past Surgical History:   Procedure Laterality Date     HC LAPAROSCOPIC MYOMECTOMY, 1 - 4 INTRAMURAL MYOMAS =<250 GM  09/2006    laparoscopic, pedunculated     LAMINECT/DISCECTOMY, LUMBAR  11/15/11    L4-L5     ZZC APPENDECTOMY,RUPT APPENDX+ABSCESS  08/1989       Social History   I have reviewed this patient's social history and updated it with pertinent information if needed.  Patient resides in a house in [place], [place] with her dog.  She currently smokes up to 1 pack per day.  She consumes alcohol socially 2-3 times per week with 2-3 alcoholic beverages.  She does not use illicit drugs.  She does not use a cane or walker.    Social History     Tobacco Use     Smoking status: Current Every Day Smoker     Packs/day: 0.50     Years: 18.00     Pack years: 9.00     Types: Cigarettes     Smokeless tobacco: Never Used   Substance Use Topics     Alcohol use: Yes     Comment: once or twice weekly     Drug use: No       Family History   I have reviewed this patient's family history and updated it with pertinent information if needed.   Family History   Problem Relation Age of Onset     Hypertension Father      Hypertension Mother      Cerebrovascular Disease Paternal Grandmother      Arthritis Mother      Depression Mother      Eye Disorder Father         cataracts     Lipids Father      Heart Disease Father         heart failure       Medications   Prior to Admission Medications   Prescriptions Last Dose Informant Patient Reported? Taking?   COLLAGEN PO 9/28/2022 at AM Self Yes Yes   Sig: Take 1 tablet by mouth daily   Vitamin D3 (CHOLECALCIFEROL) 125 MCG (5000 UT) tablet 9/28/2022 at AM Self Yes Yes   Sig: Take 125 mcg by mouth daily   fish oil-omega-3 fatty acids 1000 MG capsule 9/28/2022 at AM Self Yes Yes   Sig: Take 1 g by mouth daily   hypromellose (GENTEAL) 0.3 % SOLN ophthalmic solution Past Week at PRN Self Yes Yes   Sig: Place 1 drop into both eyes every hour as needed for  dry eyes   multivitamin, therapeutic (THERA-VIT) TABS tablet 9/28/2022 at AM Self Yes Yes   Sig: Take 1 tablet by mouth daily      Facility-Administered Medications: None     Allergies   Allergies   Allergen Reactions     Imitrex [Sumatriptan Succinate]      Sulfa Drugs Rash       Physical Exam   Vital Signs: Temp: 98.6  F (37  C) Temp src: Oral BP: 124/73 Pulse: 87   Resp: 16 SpO2: 99 % O2 Device: None (Room air)    Weight: 130 lbs 0 oz    Constitutional: Awake, alert, cooperative, no apparent distress.    ENT: Normocephalic, without obvious abnormality, atraumatic, oral pharynx with moist mucus membranes, tonsils without erythema or exudates.  Eyes pupils are equal, round and reactive to light; extra occular movements intact.  Normal sclera.    Neck: Supple, symmetrical, trachea midline, no adenopathy.  Pulmonary: No increased work of breathing, good air exchange, clear to auscultation bilaterally, no crackles or wheezing.  Cardiovascular: Regular rate and rhythm, normal S1 and S2, no S3 or S4, and soft systolic murmur noted.  GI: Normal bowel sounds, soft, non-distended, non-tender.    Skin/Integumen: Visualized skin appeared clear.  Neuro: CN II-XII grossly intact.  Upper extremities strength, coordination and sensation intact bilaterally.    Psych:  Alert and oriented x 3. Normal affect.  Extremities: Right lower extremity edema noted around the distal aspect of the leg with rotational deformity noted.  Patient able to wiggle all her toes (painful on the right).       Data   I personally reviewed the EKG tracing showing sinus rhythm without ischemic changes; noted T-wave inversion in V1.  Results for orders placed or performed during the hospital encounter of 09/29/22 (from the past 24 hour(s))   XR Tibia and Fibula Right 2 Views    Narrative    EXAM: XR TIBIA AND FIBULA RIGHT 2 VIEWS  LOCATION: [facility]  DATE/TIME: 9/29/2022 6:57 AM    INDICATION: Fall, pain  COMPARISON: None.       Impression    IMPRESSION: Acute mildly displaced oblique fracture through the distal tibia with likely intra-articular extension. There is also an acute oblique mild to moderately displaced fracture of the distal fibula. Consider ankle radiographs to better assess the   tibiotalar articulation. Moderate soft tissue edema seen throughout the lower extremity.   Asymptomatic COVID-19 Virus (Coronavirus) by PCR Nasopharyngeal    Specimen: Nasopharyngeal; Swab   Result Value Ref Range    SARS CoV2 PCR Negative Negative    Narrative    Testing was performed using the Xpert Xpress SARS-CoV-2 Assay on the   [employer]. Additional information about   this Emergency Use Authorization (EUA) assay can be found via the Lab   Guide. This test should be ordered for the detection of SARS-CoV-2 in   individuals who meet SARS-CoV-2 clinical and/or epidemiological   criteria. Test performance is unknown in asymptomatic patients. This   test is for in vitro diagnostic use under the FDA EUA for   laboratories certified under CLIA to perform high complexity testing.   This test has not been FDA cleared or approved. A negative result   does not rule out the presence of PCR inhibitors in the specimen or   target RNA in concentration below the limit of detection for the   assay. The possibility of a false negative should be considered if   the patient's recent exposure or clinical presentation suggests   COVID-19. This test was validated by the [facility]. This laboratory is certified under the Clinical Laboratory Improvement Amendments of 1988 (CLIA-88) as qualified to perform high complexity laboratory testing.     CBC with platelets + differential    Narrative    The following orders were created for panel order CBC with platelets + differential.  Procedure                               Abnormality         Status                     ---------                                -----------         ------                     CBC with platelets and d...[[id number]]  Abnormal            Final result                 Please view results for these tests on the individual orders.   Basic metabolic panel   Result Value Ref Range    Sodium 130 (L) 133 - 144 mmol/L    Potassium 4.0 3.4 - 5.3 mmol/L    Chloride 99 94 - 109 mmol/L    Carbon Dioxide (CO2) 22 20 - 32 mmol/L    Anion Gap 9 3 - 14 mmol/L    Urea Nitrogen 7 7 - 30 mg/dL    Creatinine 0.50 (L) 0.52 - 1.04 mg/dL    Calcium 8.6 8.5 - 10.1 mg/dL    Glucose 99 70 - 99 mg/dL    GFR Estimate >90 >60 mL/min/1.73m2   CBC with platelets and differential   Result Value Ref Range    WBC Count 7.3 4.0 - 11.0 10e3/uL    RBC Count 3.57 (L) 3.80 - 5.20 10e6/uL    Hemoglobin 11.4 (L) 11.7 - 15.7 g/dL    Hematocrit 33.8 (L) 35.0 - 47.0 %    MCV 95 78 - 100 fL    MCH 31.9 26.5 - 33.0 pg    MCHC 33.7 31.5 - 36.5 g/dL    RDW 12.8 10.0 - 15.0 %    Platelet Count 232 150 - 450 10e3/uL    % Neutrophils 85 %    % Lymphocytes 4 %    % Monocytes 10 %    % Eosinophils 1 %    % Basophils 0 %    % Immature Granulocytes 0 %    NRBCs per 100 WBC 0 <1 /100    Absolute Neutrophils 6.2 1.6 - 8.3 10e3/uL    Absolute Lymphocytes 0.3 (L) 0.8 - 5.3 10e3/uL    Absolute Monocytes 0.7 0.0 - 1.3 10e3/uL    Absolute Eosinophils 0.1 0.0 - 0.7 10e3/uL    Absolute Basophils 0.0 0.0 - 0.2 10e3/uL    Absolute Immature Granulocytes 0.0 <=0.4 10e3/uL    Absolute NRBCs 0.0 10e3/uL   CT Tibia Fibula Lower Leg Right wo Contrast    Narrative    CT TIBIA/FIBULA LOWER LEG RIGHT WITHOUT CONTRAST September 29, 2022  8:29 AM    INDICATION: Fracture, surgical planning.    COMPARISON: 9/29/2022.    TECHNIQUE: Noncontrast. Axial, sagittal and coronal thin-section  reconstruction. Dose reduction techniques were used.     FINDINGS: There is a spiral, mildly displaced distal tibial fracture  with up to 6 mm of fracture fragment distraction and surrounding blood  products in the adjacent soft tissues.  There is no extension into the  tibial plafond.    There is also a comminuted and mildly displaced distal fibular/lateral  malleolus fracture with up to 6 mm of medial collateral displacement  and mild impaction. The fracture extends into the distal syndesmosis  where there is a mildly distracted fracture fragment near the expected  location of the AITFL ligament attachment site.    There is a nondisplaced small fracture of the posterior malleolus with  extension into the tibiotalar joint.      Impression    IMPRESSION:  1.  Spiral distal tibial shaft fracture with mild displacement.  2.  Comminuted distal fibular/lateral malleolus fracture with  extension into the inferior lateral ankle syndesmosis.  3.  Small nondisplaced posterior malleolus fracture with extension  into the tibiotalar joint.    MARISOL LUA MD         SYSTEM ID:  [id number]   EKG 12-lead, tracing only   Result Value Ref Range    Systolic Blood Pressure  mmHg    Diastolic Blood Pressure  mmHg    Ventricular Rate 89 BPM    Atrial Rate 89 BPM    IL Interval 126 ms    QRS Duration 88 ms     ms    QTc 420 ms    P Axis 60 degrees    R AXIS 59 degrees    T Axis 56 degrees    Interpretation ECG       Sinus rhythm  Normal ECG  No previous ECGs available

## 2022-09-29 NOTE — PROGRESS NOTES
RECEIVING UNIT ED HANDOFF REVIEW    ED Nurse Handoff Report was reviewed by: Depeak Hurst RN on September 29, 2022 at 12:20 PM

## 2022-09-29 NOTE — ANESTHESIA PROCEDURE NOTES
Airway       Patient location during procedure: OR       Procedure Start/Stop Times: 9/29/2022 6:05 PM  Staff -        CRNA: Alayna Almeida APRN CRNA       Performed By: CRNA  Consent for Airway        Urgency: elective  Indications and Patient Condition       Indications for airway management: jose-procedural       Induction type:intravenous       Mask difficulty assessment: 2 - vent by mask + OA or adjuvant +/- NMBA    Final Airway Details       Final airway type: endotracheal airway       Successful airway: ETT - single and Oral  Endotracheal Airway Details        ETT size (mm): 7.0       Successful intubation technique: direct laryngoscopy       DL Blade Type: Packer 2       Grade View of Cords: 1       Adjucts: stylet       Position: Right       Measured from: lips       Secured at (cm): 22       Bite block used: None    Post intubation assessment        Placement verified by: capnometry and equal breath sounds        Number of attempts at approach: 1       Secured with: plastic tape       Ease of procedure: easy       Dentition: Intact and Unchanged    Medication(s) Administered   Medication Administration Time: 9/29/2022 6:05 PM

## 2022-09-29 NOTE — ANESTHESIA PROCEDURE NOTES
Saphenous Procedure Note    Pre-Procedure   Staff -        Anesthesiologist:  Mitchlel David DO       Performed By: anesthesiologist       Location: pre-op       Pre-Anesthestic Checklist: patient identified, IV checked, site marked, risks and benefits discussed, informed consent, monitors and equipment checked, pre-op evaluation, at physician/surgeon's request and post-op pain management  Timeout:       Correct Patient: Yes        Correct Procedure: Yes        Correct Site: Yes        Correct Position: Yes        Correct Laterality: Yes        Site Marked: Yes  Procedure Documentation  Procedure: Saphenous       Laterality: right       Patient Position: supine       Patient Prep/Sterile Barriers: sterile gloves, mask, patient draped       Skin prep: Chloraprep       Local skin infiltrated with 3 mL of 1% lidocaine.        Needle Type: insulated and short bevel       Needle Gauge: 21.        Needle Length (Inches): 4        Ultrasound guided       1. Ultrasound was used to identify targeted nerve, plexus, vascular marker, or fascial plane and place a needle adjacent to it in real-time.       2. Ultrasound was used to visualize the spread of anesthetic in close proximity to the above referenced structure.       3. A permanent image is entered into the patient's record.    Assessment/Narrative         The placement was negative for: blood aspirated, painful injection and site bleeding       Paresthesias: No.       Test dose of mL at.         Test dose negative, 3 minutes after injection, for signs of intravascular, subdural, or intrathecal injection.       Bolus given via needle..        Secured via.        Insertion/Infusion Method: Single Shot       Complications: none    Medication(s) Administered   Bupivacaine 0.5% w/ 1:400K Epi (Injection) - Injection   10 mL - 9/29/2022 5:02:00 PM   Comments:  Ultrasound Interpretation, peripheral nerve block    1.  Under ultrasound guidance, the needle was inserted  and placed in close proximity to the target nerve(s).  2. Ultrasound was also used to visualize the spread of the anesthetic in close proximity to the nerve(s) being blocked.  10 ml of 0.5% Bupivacaine w/ 1:400K Epi, in total, was administered in incremental doses, with intermittent negative aspiration.     3. The nerve(s) appeared anatomically normal.  4. There were no apparent abnormal pathological findings.  5. A permanent ultrasound image was saved in the patient's record.    Pt tolerated the procedure well.     The surgeon has given a verbal order transferring care of this patient to me for the performance of a regional analgesia block for post-op pain control. It is requested of me because I am uniquely trained and qualified to perform this block and the surgeon is neither trained nor qualified to perform this procedure.

## 2022-09-29 NOTE — TREATMENT PLAN
Right distal tibia/fibula fracture    Plan:  Patient scheduled for an ORIF of the right tibia later today pending patient is optimized for surgery per hospitalist (consult ordered)    Surgeon: Dr RAMÓN Gallardo  CT ordered for surgical planning   NPO Status effective now   NWB/Bedrest   Elevate on foam wedge   Hold anticoagulants  Pain medication as needed

## 2022-09-29 NOTE — CONSULTS
[facility]    Orthopedic Consultation    Leanne Valentino MRN# [id number]   Age: 57 year old YOB: 1965     Date of Admission: 9/29/2022    Reason for consult: Right distal tibia/fibula fractures        Requesting provider: Call from ED       Level of consult: Consult, follow and place orders           Assessment and Plan:   Assessment:   Right distal tibia/fibula fractures      Plan:   Patient scheduled for an ORIF of the right tibia later today pending patient is optimized for surgery per hospitalist (consult ordered)    Surgeon: Dr RAMÓN Gallardo  CT ordered for surgical planning   NPO Status effective now   NWB/Bedrest   Hold anticoagulants  Pain medication as needed              Chief Complaint:   Right leg pain          History of Present Illness:   Leanne Valentino is a 57 year old female with no significant medical history who presented to the ED with right leg injury. Patient states that she tripped over her dog last night after going to the bathroom. She laid on the floor for several hours prior to calling a neighbor to bring her in.  Xrays confirmed the above fractures.  She lives alone in a one level home.  Ambulates independently at baseline. She denies taking blood thinners.           Past Medical History:     Past Medical History:   Diagnosis Date     Adjustment disorder with mixed anxiety and depressed mood      Leiomyoma of uterus, unspecified              Past Surgical History:     Past Surgical History:   Procedure Laterality Date     HC LAPAROSCOPIC MYOMECTOMY, 1 - 4 INTRAMURAL MYOMAS =<250 GM  09/2006    laparoscopic, pedunculated     LAMINECT/DISCECTOMY, LUMBAR  11/15/11    L4-L5     ZZC APPENDECTOMY,RUPT APPENDX+ABSCESS  08/1989             Social History:     Social History     Tobacco Use     Smoking status: Current Every Day Smoker     Packs/day: 0.50     Years: 18.00     Pack years: 9.00     Types: Cigarettes     Smokeless tobacco: Never Used    Substance Use Topics     Alcohol use: Yes     Comment: once or twice weekly             Family History:     Family History   Problem Relation Age of Onset     Hypertension Father      Hypertension Mother      Cerebrovascular Disease Paternal Grandmother      Arthritis Mother      Depression Mother      Eye Disorder Father         cataracts     Lipids Father      Heart Disease Father         heart failure             Immunizations:     VACCINE/DOSE   Diptheria   DPT   DTAP   HBIG   Hepatitis A   Hepatitis B   HIB   Influenza   Measles   Meningococcal   MMR   Mumps   Pneumococcal   Polio   Rubella   Small Pox   TDAP   Varicella   Zoster             Allergies:     Allergies   Allergen Reactions     Imitrex [Sumatriptan Succinate]      Sulfa Drugs Rash             Medications:     Current Facility-Administered Medications   Medication     HYDROmorphone (PF) (DILAUDID) injection 0.5 mg     Current Outpatient Medications   Medication Sig     COLLAGEN PO Take 1 tablet by mouth daily     fish oil-omega-3 fatty acids 1000 MG capsule Take 1 g by mouth daily     hypromellose (GENTEAL) 0.3 % SOLN ophthalmic solution Place 1 drop into both eyes every hour as needed for dry eyes     multivitamin, therapeutic (THERA-VIT) TABS tablet Take 1 tablet by mouth daily     Vitamin D3 (CHOLECALCIFEROL) 125 MCG (5000 UT) tablet Take 125 mcg by mouth daily             Review of Systems:   ROS:  10 point ROS neg other than the symptoms noted above in the HPI.            Physical Exam:   All vitals have been reviewed  Patient Vitals for the past 24 hrs:   BP Temp Temp src Pulse Resp SpO2 Height Weight   09/29/22 1000 114/77 -- -- 88 -- 97 % -- --   09/29/22 0934 -- -- -- -- -- 97 % -- --   09/29/22 0901 116/71 -- -- 80 -- 92 % -- --   09/29/22 0725 -- -- -- -- -- 98 % -- --   09/29/22 0720 -- -- -- 84 -- 99 % -- --   09/29/22 0715 (!) 146/83 -- -- -- -- -- -- --   09/29/22 0622 -- -- -- 97 -- -- -- --   09/29/22 0620 139/79 98.6  F (37  C)  "Temporal -- 20 100 % 1.6 m (5' 3\") 59 kg (130 lb)     No intake or output data in the 24 hours ending 09/29/22 1039      Physical Exam   Temp: 98.6  F (37  C) Temp src: Temporal BP: 114/77 Pulse: 88   Resp: 20 SpO2: 97 % O2 Device: Nasal cannula    Vital Signs with Ranges  Temp:  [98.6  F (37  C)] 98.6  F (37  C)  Pulse:  [80-97] 88  Resp:  [20] 20  BP: (114-146)/(71-83) 114/77  SpO2:  [92 %-100 %] 97 %  130 lbs 0 oz    Constitutional: Pleasant, alert, appropriate, following commands.  HEENT: Head atraumatic normocephalic. Pupils equal round and reactive to light.  Respiratory: Unlabored breathing no audible wheeze  Cardiovascular: Regular rate and rhythm per pulses  GI: Abdomen non-distended.  Lymph/Hematologic: No lymphadenopathy in areas examined  Genitourinary:  No shelton  Skin: No rashes, no cyanosis, no edema.  Musculoskeletal: On physical exam of the right ankle/low leg  Skin: intact, no open wound  Swelling: surrounding the right distal low leg  Alignment: rotational deformity   Tenderness to palpation along distal tibia and fibula   ROM: deferred at ankle due to fracture.  Able to flex/extend toes.   Distal pulses are intact and equal bilaterally  Sensation to light touch intact and equal bilaterally.   Neurologic: normal without focal findings, mental status, speech normal, alert and oriented x iii  Neuropsychiatric: stable            Data:   All laboratory data reviewed  Results for orders placed or performed during the hospital encounter of 09/29/22   XR Tibia and Fibula Right 2 Views     Status: None    Narrative    EXAM: XR TIBIA AND FIBULA RIGHT 2 VIEWS  LOCATION: [facility]  DATE/TIME: 9/29/2022 6:57 AM    INDICATION: Fall, pain  COMPARISON: None.      Impression    IMPRESSION: Acute mildly displaced oblique fracture through the distal tibia with likely intra-articular extension. There is also an acute oblique mild to moderately displaced fracture of the distal fibula. " Consider ankle radiographs to better assess the   tibiotalar articulation. Moderate soft tissue edema seen throughout the lower extremity.   CT Tibia Fibula Lower Leg Right wo Contrast     Status: None    Narrative    CT TIBIA/FIBULA LOWER LEG RIGHT WITHOUT CONTRAST September 29, 2022  8:29 AM    INDICATION: Fracture, surgical planning.    COMPARISON: 9/29/2022.    TECHNIQUE: Noncontrast. Axial, sagittal and coronal thin-section  reconstruction. Dose reduction techniques were used.     FINDINGS: There is a spiral, mildly displaced distal tibial fracture  with up to 6 mm of fracture fragment distraction and surrounding blood  products in the adjacent soft tissues. There is no extension into the  tibial plafond.    There is also a comminuted and mildly displaced distal fibular/lateral  malleolus fracture with up to 6 mm of medial collateral displacement  and mild impaction. The fracture extends into the distal syndesmosis  where there is a mildly distracted fracture fragment near the expected  location of the AITFL ligament attachment site.    There is a nondisplaced small fracture of the posterior malleolus with  extension into the tibiotalar joint.      Impression    IMPRESSION:  1.  Spiral distal tibial shaft fracture with mild displacement.  2.  Comminuted distal fibular/lateral malleolus fracture with  extension into the inferior lateral ankle syndesmosis.  3.  Small nondisplaced posterior malleolus fracture with extension  into the tibiotalar joint.    MARISOL LUA MD         SYSTEM ID:  [id number]   Basic metabolic panel     Status: Abnormal   Result Value Ref Range    Sodium 130 (L) 133 - 144 mmol/L    Potassium 4.0 3.4 - 5.3 mmol/L    Chloride 99 94 - 109 mmol/L    Carbon Dioxide (CO2) 22 20 - 32 mmol/L    Anion Gap 9 3 - 14 mmol/L    Urea Nitrogen 7 7 - 30 mg/dL    Creatinine 0.50 (L) 0.52 - 1.04 mg/dL    Calcium 8.6 8.5 - 10.1 mg/dL    Glucose 99 70 - 99 mg/dL    GFR Estimate >90 >60 mL/min/1.73m2    Asymptomatic COVID-19 Virus (Coronavirus) by PCR Nasopharyngeal     Status: Normal    Specimen: Nasopharyngeal; Swab   Result Value Ref Range    SARS CoV2 PCR Negative Negative    Narrative    Testing was performed using the [employer] Xpress SARS-CoV-2 Assay on the   Cepheid Gene-Xpert Instrument Systems. Additional information about   this Emergency Use Authorization (EUA) assay can be found via the Lab   Guide. This test should be ordered for the detection of SARS-CoV-2 in   individuals who meet SARS-CoV-2 clinical and/or epidemiological   criteria. Test performance is unknown in asymptomatic patients. This   test is for in vitro diagnostic use under the FDA EUA for   laboratories certified under CLIA to perform high complexity testing.   This test has not been FDA cleared or approved. A negative result   does not rule out the presence of PCR inhibitors in the specimen or   target RNA in concentration below the limit of detection for the   assay. The possibility of a false negative should be considered if   the patient's recent exposure or clinical presentation suggests   COVID-19. This test was validated by the [facility]. This laboratory is certified under the Clinical Laboratory Improvement Amendments of 1988 (CLIA-88) as qualified to perform high complexity laboratory testing.     CBC with platelets and differential     Status: Abnormal   Result Value Ref Range    WBC Count 7.3 4.0 - 11.0 10e3/uL    RBC Count 3.57 (L) 3.80 - 5.20 10e6/uL    Hemoglobin 11.4 (L) 11.7 - 15.7 g/dL    Hematocrit 33.8 (L) 35.0 - 47.0 %    MCV 95 78 - 100 fL    MCH 31.9 26.5 - 33.0 pg    MCHC 33.7 31.5 - 36.5 g/dL    RDW 12.8 10.0 - 15.0 %    Platelet Count 232 150 - 450 10e3/uL    % Neutrophils 85 %    % Lymphocytes 4 %    % Monocytes 10 %    % Eosinophils 1 %    % Basophils 0 %    % Immature Granulocytes 0 %    NRBCs per 100 WBC 0 <1 /100    Absolute Neutrophils 6.2 1.6 - 8.3 10e3/uL    Absolute  Lymphocytes 0.3 (L) 0.8 - 5.3 10e3/uL    Absolute Monocytes 0.7 0.0 - 1.3 10e3/uL    Absolute Eosinophils 0.1 0.0 - 0.7 10e3/uL    Absolute Basophils 0.0 0.0 - 0.2 10e3/uL    Absolute Immature Granulocytes 0.0 <=0.4 10e3/uL    Absolute NRBCs 0.0 10e3/uL   CBC with platelets + differential     Status: Abnormal    Narrative    The following orders were created for panel order CBC with platelets + differential.  Procedure                               Abnormality         Status                     ---------                               -----------         ------                     CBC with platelets and d...[[id number]]  Abnormal            Final result                 Please view results for these tests on the individual orders.          Attestation:  I have reviewed today's vital signs, notes, medications, labs and imaging with Dr. RAMÓN Gallardo.  Amount of time performed on this consult: 45 minutes.    Adelaide Nobles PA-C

## 2022-09-29 NOTE — ANESTHESIA PROCEDURE NOTES
Sciatic Procedure Note    Pre-Procedure   Staff -        Anesthesiologist:  Mitchell David DO       Performed By: anesthesiologist       Location: pre-op       Pre-Anesthestic Checklist: patient identified, IV checked, site marked, risks and benefits discussed, informed consent, monitors and equipment checked, pre-op evaluation, at physician/surgeon's request and post-op pain management  Timeout:       Correct Patient: Yes        Correct Procedure: Yes        Correct Site: Yes        Correct Position: Yes        Correct Laterality: Yes        Site Marked: Yes  Procedure Documentation  Procedure: Sciatic       Laterality: right       Patient Position: supine       Patient Prep/Sterile Barriers: sterile gloves, mask, patient draped       Skin prep: Chloraprep       Local skin infiltrated with 3 mL of 1% lidocaine.  (popliteal approach).       Needle Type: insulated and short bevel       Needle Gauge: 21.        Needle Length (Inches): 4        Ultrasound guided       1. Ultrasound was used to identify targeted nerve, plexus, vascular marker, or fascial plane and place a needle adjacent to it in real-time.       2. Ultrasound was used to visualize the spread of anesthetic in close proximity to the above referenced structure.       3. A permanent image is entered into the patient's record.    Assessment/Narrative         The placement was negative for: blood aspirated, painful injection and site bleeding       Paresthesias: No.       Test dose of mL at.         Test dose negative, 3 minutes after injection, for signs of intravascular, subdural, or intrathecal injection.       Bolus given via needle..        Secured via.        Insertion/Infusion Method: Single Shot       Complications: none    Medication(s) Administered   Bupivacaine 0.5% w/ 1:400K Epi (Injection) - Injection   15 mL - 9/29/2022 5:01:00 PM   Comments:  Ultrasound Interpretation, peripheral nerve block    1.  Under ultrasound guidance, the  needle was inserted and placed in close proximity to the target nerve(s).  2. Ultrasound was also used to visualize the spread of the anesthetic in close proximity to the nerve(s) being blocked.  15ml of 0.5% Bupivacaine w/ 1:400K Epi, in total, was administered in incremental doses, with intermittent negative aspiration.     3. The nerve(s) appeared anatomically normal.  4. There were no apparent abnormal pathological findings.  5. A permanent ultrasound image was saved in the patient's record.    Pt tolerated the procedure well.     The surgeon has given a verbal order transferring care of this patient to me for the performance of a regional analgesia block for post-op pain control. It is requested of me because I am uniquely trained and qualified to perform this block and the surgeon is neither trained nor qualified to perform this procedure.

## 2022-09-29 NOTE — PHARMACY-ADMISSION MEDICATION HISTORY
Pharmacy Medication History  Admission medication history interview status for the 9/29/2022  admission is complete. See EPIC admission navigator for prior to admission medications     Location of Interview: Patient room  Medication history sources: Patient, Surescripts and Care Everywhere    Significant changes made to the medication list:  Added all medications listed below.     In the past week, patient estimated taking medication this percent of the time: greater than 90%    Additional medication history information:   Takes collagen but does not know the dose.    Medication reconciliation completed by provider prior to medication history? No    Time spent in this activity: 8 minutes    Prior to Admission medications    Medication Sig Last Dose Taking? Auth Provider Long Term End Date   COLLAGEN PO Take 1 tablet by mouth daily 9/28/2022 at AM Yes Unknown, Entered By History     fish oil-omega-3 fatty acids 1000 MG capsule Take 1 g by mouth daily 9/28/2022 at AM Yes Unknown, Entered By History     hypromellose (GENTEAL) 0.3 % SOLN ophthalmic solution Place 1 drop into both eyes every hour as needed for dry eyes Past Week at PRN Yes Unknown, Entered By History     multivitamin, therapeutic (THERA-VIT) TABS tablet Take 1 tablet by mouth daily 9/28/2022 at AM Yes Unknown, Entered By History     Vitamin D3 (CHOLECALCIFEROL) 125 MCG (5000 UT) tablet Take 125 mcg by mouth daily 9/28/2022 at AM Yes Unknown, Entered By History         The information provided in this note is only as accurate as the sources available at the time of update(s)

## 2022-09-29 NOTE — PLAN OF CARE
Goal Outcome Evaluation:    Patient transferred from ED @ 5210. A&O x4. On bedrest, refused to turn due to severe pain on RLE. Not able to do complete skin assessment. Taking oxycodone and IV dilaudid along with ice application for pain with very little relief. Voiding. NPO. IVF infusing. Transferred to Surgery Unit @ 9432. Will continue to monitor.

## 2022-09-29 NOTE — ED NOTES
[facility]  ED Nurse Handoff Report    ED Chief complaint: Leg Injury (Left lower leg injury due to fall )    ED Diagnosis:   Final diagnoses:   Tibia/fibula fracture, right, closed, initial encounter       Code Status: To be discussed by admitting provider    Allergies:   Allergies   Allergen Reactions     Imitrex [Sumatriptan Succinate]      Sulfa Drugs Rash       Patient Story: Pt with no significant medical hx presents to the ED to be evaluated for a right leg injury. Pt reports that she tripped over the dog last night after going to the bathroom. Pt denies hitting head, denies LOC, and denies being on blood thinners.   Focused Assessment:   Respiratory: Regular rate and rhythm, denies sob, on RA stating high 90s.   Cardiac: denies chest pain, radial pulse regular  Neuro: A+O x4  Musculoskeletal: Right leg swollen, bruised, skin intact, pulse intact.     Treatments and/or interventions provided:   XR Tibia and Fibula Right 2 Views   Final Result   IMPRESSION: Acute mildly displaced oblique fracture through the distal tibia with likely intra-articular extension. There is also an acute oblique mild to moderately displaced fracture of the distal fibula. Consider ankle radiographs to better assess the    tibiotalar articulation. Moderate soft tissue edema seen throughout the lower extremity.          Patient's response to treatments and/or interventions: tolerated appropriately       To be done/followed up on inpatient unit:  repeat labs, imaging, frequent vitals    Does this patient have any cognitive concerns?: none    Activity level - Baseline/Home:  Independent  Activity Level - Current:   Stand with Assist    Patient's Preferred language: English   Needed?: No    Isolation: None  Infection: Not Applicable  Patient tested for COVID 19 prior to admission: NO  Bariatric?: No    Vital Signs:   Vitals:    09/29/22 0622 09/29/22 0715 09/29/22 0720 09/29/22 0725   BP:  (!) 146/83     Pulse:  97  84    Resp:       Temp:       TempSrc:       SpO2:   99% 98%   Weight:       Height:           Cardiac Rhythm:     Was the PSS-3 completed:   Yes  What interventions are required if any?               Family Comments:   OBS brochure/video discussed/provided to patient/family: Yes              Name of person given brochure if not patient:               Relationship to patient:    For the majority of the shift this patient's behavior was Green.   Behavioral interventions performed were .    ED NURSE PHONE NUMBER: [phone]

## 2022-09-29 NOTE — ANESTHESIA PREPROCEDURE EVALUATION
Anesthesia Pre-Procedure Evaluation    Patient: Leanne Valentino   MRN: [id number] : 1965        Procedure : Procedure(s):  OPEN REDUCTION INTERNAL FIXATION RIGHT TIBIA.          Past Medical History:   Diagnosis Date     Adjustment disorder with mixed anxiety and depressed mood      Leiomyoma of uterus, unspecified       Past Surgical History:   Procedure Laterality Date     HC LAPAROSCOPIC MYOMECTOMY, 1 - 4 INTRAMURAL MYOMAS =<250 GM  2006    laparoscopic, pedunculated     LAMINECT/DISCECTOMY, LUMBAR  11/15/11    L4-L5     ZZC APPENDECTOMY,RUPT APPENDX+ABSCESS  1989      Allergies   Allergen Reactions     Imitrex [Sumatriptan Succinate]      Sulfa Drugs Rash      Social History     Tobacco Use     Smoking status: Current Every Day Smoker     Packs/day: 0.50     Years: 18.00     Pack years: 9.00     Types: Cigarettes     Smokeless tobacco: Never Used   Substance Use Topics     Alcohol use: Yes     Comment: once or twice weekly      Wt Readings from Last 1 Encounters:   22 59 kg (130 lb)        Anesthesia Evaluation            ROS/MED HX  ENT/Pulmonary:     (+) tobacco use, Current use,     Neurologic: Comment: LBP - herniated disc      Cardiovascular:     (+) -----Previous cardiac testing   Echo: Date: 22 Results:  Interpretation Summary     Left ventricular systolic function is normal.  No regional wall motion abnormalities noted.  The visual ejection fraction is 65-70%.  The study was technically difficult. There is no comparison study available.  Stress Test: Date: Results:    ECG Reviewed: Date: Results:    Cath: Date: Results:      METS/Exercise Tolerance:     Hematologic:       Musculoskeletal:       GI/Hepatic:  - neg GI/hepatic ROS  (-) GERD   Renal/Genitourinary: Comment: Uterine fibroids      Endo:  - neg endo ROS     Psychiatric/Substance Use:     (+) psychiatric history anxiety and depression     Infectious Disease:  - neg infectious disease ROS     Malignancy:       Other:             Physical Exam    Airway        Mallampati: II   TM distance: > 3 FB   Neck ROM: full   Mouth opening: > 3 cm    Respiratory Devices and Support         Dental  no notable dental history         Cardiovascular   cardiovascular exam normal          Pulmonary   pulmonary exam normal                OUTSIDE LABS:  CBC:   Lab Results   Component Value Date    WBC 7.3 09/29/2022    WBC 10.1 07/13/2010    HGB 11.4 (L) 09/29/2022    HGB 13.3 07/13/2010    HCT 33.8 (L) 09/29/2022    HCT 39.2 07/13/2010     09/29/2022     07/13/2010     BMP:   Lab Results   Component Value Date     (L) 09/29/2022     (L) 09/29/2022    POTASSIUM 4.0 09/29/2022    POTASSIUM 4.0 07/16/2012    CHLORIDE 99 09/29/2022    CHLORIDE 107 07/16/2012    CO2 22 09/29/2022    CO2 23 07/16/2012    BUN 7 09/29/2022    BUN 16 07/16/2012    CR 0.50 (L) 09/29/2022    CR 0.61 07/16/2012    GLC 99 09/29/2022    GLC 94 07/16/2012     COAGS: No results found for: PTT, INR, FIBR  POC:   Lab Results   Component Value Date    HCG Negative 08/03/2010     HEPATIC: No results found for: ALBUMIN, PROTTOTAL, ALT, AST, GGT, ALKPHOS, BILITOTAL, BILIDIRECT, SEUN  OTHER:   Lab Results   Component Value Date    ALICE 8.6 09/29/2022    TSH 0.89 07/16/2012       Anesthesia Plan    ASA Status:  2   NPO Status:  NPO Appropriate    Anesthesia Type: General.     - Airway: ETT   Induction: Intravenous, Propofol.   Maintenance: Balanced.        Consents    Anesthesia Plan(s) and associated risks, benefits, and realistic alternatives discussed. Questions answered and patient/representative(s) expressed understanding.    - Discussed:     - Discussed with:  Patient         Postoperative Care    Pain management: IV analgesics, Peripheral nerve block (Single Shot).   PONV prophylaxis: Ondansetron (or other 5HT-3), Dexamethasone or Solumedrol, Background Propofol Infusion     Comments:                Mitchell David, DO, DO

## 2022-09-29 NOTE — ED PROVIDER NOTES
"  History   Chief Complaint:  Leg Injury     The history is provided by the patient.      Leanne Valentino is a 57 year old female with no significant medical history who presents with right leg injury. Patient states that she tripped over her dog last night after going to the bathroom. She reports last eating yesterday evening. She denies taking blood thinners.     Review of Systems   Musculoskeletal: Positive for arthralgias.   All other systems reviewed and are negative.    Allergies:  Sumatriptan Succinate  Sulfa Drugs    Medications:  The patient is currently on no regular medications.    Past Medical History:     Elderly primigravida  Dysmenorrhea   Leiomyoma of uterus   Mild recurrent major depression   Menorrhagia   Lumbar herniated disc     Past Surgical History:    Laminect/ discectomy, lumbar  Appendectomy   Fibroid removal   Lasik      Family History:    Mother- hypertension, depression, arthritis   Father- hypertension, cataracts, lipids, heart failure     Social History:  Presents alone   Presents via private vehicle   PCP: No Ref-Primary, Physician     Physical Exam     Patient Vitals for the past 24 hrs:   BP Temp Temp src Pulse Resp SpO2 Height Weight   09/29/22 1231 124/73 98.6  F (37  C) Oral 87 16 99 % -- --   09/29/22 1215 -- -- -- -- -- 100 % -- --   09/29/22 1210 -- -- -- -- -- 99 % -- --   09/29/22 1205 113/72 -- -- -- -- 100 % -- --   09/29/22 1100 114/70 -- -- 86 -- 98 % -- --   09/29/22 1000 114/77 -- -- 88 -- 97 % -- --   09/29/22 0934 -- -- -- -- -- 97 % -- --   09/29/22 0901 116/71 -- -- 80 -- 92 % -- --   09/29/22 0725 -- -- -- -- -- 98 % -- --   09/29/22 0720 -- -- -- 84 -- 99 % -- --   09/29/22 0715 (!) 146/83 -- -- -- -- -- -- --   09/29/22 0622 -- -- -- 97 -- -- -- --   09/29/22 0620 139/79 98.6  F (37  C) Temporal -- 20 100 % 1.6 m (5' 3\") 59 kg (130 lb)       Physical Exam  General: Alert, No distress. Nontoxic appearance. Alert sitting in a wheelchair in triage area.   Head: No " signs of trauma.   Mouth/Throat: Oropharynx moist.   Eyes: Conjunctivae are normal. Pupils are equal..   Neck: Normal range of motion.    CV: Appears well perfused.  Resp:No respiratory distress.   MSK: Normal range of motion. No obvious deformity.   Neuro: The patient is alert and interactive. TOVAR. Speech normal. GCS 15  Skin: No lesion or sign of trauma noted.   Psych: normal mood and affect. behavior is normal.     Emergency Department Course     Imaging:  CT Tibia Fibula Lower Leg Right wo Contrast   Final Result   IMPRESSION:   1.  Spiral distal tibial shaft fracture with mild displacement.   2.  Comminuted distal fibular/lateral malleolus fracture with   extension into the inferior lateral ankle syndesmosis.   3.  Small nondisplaced posterior malleolus fracture with extension   into the tibiotalar joint.      MARISOL LUA MD            SYSTEM ID:  [id number]      XR Tibia and Fibula Right 2 Views   Final Result   IMPRESSION: Acute mildly displaced oblique fracture through the distal tibia with likely intra-articular extension. There is also an acute oblique mild to moderately displaced fracture of the distal fibula. Consider ankle radiographs to better assess the    tibiotalar articulation. Moderate soft tissue edema seen throughout the lower extremity.      Echocardiogram Complete    (Results Pending)     Report per radiology    Laboratory:  Labs Ordered and Resulted from Time of ED Arrival to Time of ED Departure   BASIC METABOLIC PANEL - Abnormal       Result Value    Sodium 130 (*)     Potassium 4.0      Chloride 99      Carbon Dioxide (CO2) 22      Anion Gap 9      Urea Nitrogen 7      Creatinine 0.50 (*)     Calcium 8.6      Glucose 99      GFR Estimate >90     CBC WITH PLATELETS AND DIFFERENTIAL - Abnormal    WBC Count 7.3      RBC Count 3.57 (*)     Hemoglobin 11.4 (*)     Hematocrit 33.8 (*)     MCV 95      MCH 31.9      MCHC 33.7      RDW 12.8      Platelet Count 232      % Neutrophils 85       % Lymphocytes 4      % Monocytes 10      % Eosinophils 1      % Basophils 0      % Immature Granulocytes 0      NRBCs per 100 WBC 0      Absolute Neutrophils 6.2      Absolute Lymphocytes 0.3 (*)     Absolute Monocytes 0.7      Absolute Eosinophils 0.1      Absolute Basophils 0.0      Absolute Immature Granulocytes 0.0      Absolute NRBCs 0.0     COVID-19 VIRUS (CORONAVIRUS) BY PCR - Normal    SARS CoV2 PCR Negative       Emergency Department Course:     Reviewed:  I reviewed nursing notes, vitals, past medical history and Care Everywhere    Assessments:  0709 I obtained history and examined the patient as noted above.     Consults:  0726 I spoke with Adelaide BERRIOS,  Orthopedics, regarding this patient    Interventions:  0719 Morphine 4mg IV  0903 Hydromorphone 0.5mg IV    Disposition:  The patient was admitted to the hospital under the care of Dr. Gallardo, and will go to the OR.       Impression & Plan     Medical Decision Making:  Leanne Valentino is a 57 year old female who presents for evaluation of right lower leg pain after ripping and falling in her home last evening. CMS is intact distally in the extremity.  Pulses are normal and there are no signs of serious sequelae including compartment syndrome of the leg or foot.    Xrays reveal a tib-fib fracture that does not need reduction at this time.  However, it is unstable and will require surgical correction.     I spoke to orthopedics.  They will take the patient to the OR later today.  Patient was admitted to Dr. Gallardo    Diagnosis:    ICD-10-CM    1. Tibia/fibula fracture, right, closed, initial encounter  S82.201A Case Request: OPEN REDUCTION INTERNAL FIXATION RIGHT TIBIA    S82.401A Case Request: OPEN REDUCTION INTERNAL FIXATION RIGHT TIBIA       Scribe Disclosure:  Lorie GREGG, am serving as a scribe at 7:11 AM on 9/29/2022 to document services personally performed by Fawad Blackwood MD based on my observations and the provider's statements to  me.            Fawad Blackwood MD  09/29/22 1958

## 2022-09-29 NOTE — ED TRIAGE NOTES
Pt woke up around 0200 to go to the bathroom when she tripped over her dog and fell over. Pt denies hitting her head or No LOC

## 2022-09-30 ENCOUNTER — APPOINTMENT (OUTPATIENT)
Dept: PHYSICAL THERAPY | Facility: CLINIC | Age: 57
End: 2022-09-30
Attending: ORTHOPAEDIC SURGERY
Payer: COMMERCIAL

## 2022-09-30 VITALS
WEIGHT: 130 LBS | HEIGHT: 63 IN | OXYGEN SATURATION: 97 % | HEART RATE: 55 BPM | DIASTOLIC BLOOD PRESSURE: 62 MMHG | RESPIRATION RATE: 16 BRPM | BODY MASS INDEX: 23.04 KG/M2 | TEMPERATURE: 98.6 F | SYSTOLIC BLOOD PRESSURE: 109 MMHG

## 2022-09-30 LAB
ANION GAP SERPL CALCULATED.3IONS-SCNC: 8 MMOL/L (ref 3–14)
BUN SERPL-MCNC: 7 MG/DL (ref 7–30)
CALCIUM SERPL-MCNC: 8.2 MG/DL (ref 8.5–10.1)
CHLORIDE BLD-SCNC: 98 MMOL/L (ref 94–109)
CO2 SERPL-SCNC: 22 MMOL/L (ref 20–32)
CREAT SERPL-MCNC: 0.52 MG/DL (ref 0.52–1.04)
CREAT SERPL-MCNC: 0.52 MG/DL (ref 0.52–1.04)
ERYTHROCYTE [DISTWIDTH] IN BLOOD BY AUTOMATED COUNT: 13.3 % (ref 10–15)
GFR SERPL CREATININE-BSD FRML MDRD: >90 ML/MIN/1.73M2
GFR SERPL CREATININE-BSD FRML MDRD: >90 ML/MIN/1.73M2
GLUCOSE BLD-MCNC: 84 MG/DL (ref 70–99)
GLUCOSE BLDC GLUCOMTR-MCNC: 98 MG/DL (ref 70–99)
HCT VFR BLD AUTO: 30.9 % (ref 35–47)
HGB BLD-MCNC: 10.1 G/DL (ref 11.7–15.7)
HGB BLD-MCNC: 10.1 G/DL (ref 11.7–15.7)
MCH RBC QN AUTO: 32.2 PG (ref 26.5–33)
MCHC RBC AUTO-ENTMCNC: 32.7 G/DL (ref 31.5–36.5)
MCV RBC AUTO: 98 FL (ref 78–100)
OSMOLALITY UR: 168 MMOL/KG (ref 100–1200)
PLATELET # BLD AUTO: 190 10E3/UL (ref 150–450)
POTASSIUM BLD-SCNC: 3.9 MMOL/L (ref 3.4–5.3)
RBC # BLD AUTO: 3.14 10E6/UL (ref 3.8–5.2)
SODIUM SERPL-SCNC: 128 MMOL/L (ref 133–144)
SODIUM SERPL-SCNC: 130 MMOL/L (ref 133–144)
SODIUM UR-SCNC: 28 MMOL/L
T4 FREE SERPL-MCNC: 1.11 NG/DL (ref 0.76–1.46)
TSH SERPL DL<=0.005 MIU/L-ACNC: 0.28 MU/L (ref 0.4–4)
URATE SERPL-MCNC: 4.3 MG/DL (ref 2.6–6)
WBC # BLD AUTO: 5.9 10E3/UL (ref 4–11)

## 2022-09-30 PROCEDURE — 250N000013 HC RX MED GY IP 250 OP 250 PS 637: Performed by: PHYSICIAN ASSISTANT

## 2022-09-30 PROCEDURE — 250N000013 HC RX MED GY IP 250 OP 250 PS 637: Performed by: ORTHOPAEDIC SURGERY

## 2022-09-30 PROCEDURE — 80048 BASIC METABOLIC PNL TOTAL CA: CPT | Performed by: PHYSICIAN ASSISTANT

## 2022-09-30 PROCEDURE — 83935 ASSAY OF URINE OSMOLALITY: CPT | Performed by: PHYSICIAN ASSISTANT

## 2022-09-30 PROCEDURE — G0378 HOSPITAL OBSERVATION PER HR: HCPCS

## 2022-09-30 PROCEDURE — 84443 ASSAY THYROID STIM HORMONE: CPT | Performed by: HOSPITALIST

## 2022-09-30 PROCEDURE — 97530 THERAPEUTIC ACTIVITIES: CPT | Mod: GP

## 2022-09-30 PROCEDURE — 97161 PT EVAL LOW COMPLEX 20 MIN: CPT | Mod: GP

## 2022-09-30 PROCEDURE — 85018 HEMOGLOBIN: CPT | Performed by: ORTHOPAEDIC SURGERY

## 2022-09-30 PROCEDURE — 84550 ASSAY OF BLOOD/URIC ACID: CPT | Performed by: PHYSICIAN ASSISTANT

## 2022-09-30 PROCEDURE — 82565 ASSAY OF CREATININE: CPT | Performed by: ORTHOPAEDIC SURGERY

## 2022-09-30 PROCEDURE — 84300 ASSAY OF URINE SODIUM: CPT | Performed by: PHYSICIAN ASSISTANT

## 2022-09-30 PROCEDURE — 258N000003 HC RX IP 258 OP 636: Performed by: PHYSICIAN ASSISTANT

## 2022-09-30 PROCEDURE — 258N000003 HC RX IP 258 OP 636: Performed by: ORTHOPAEDIC SURGERY

## 2022-09-30 PROCEDURE — 36415 COLL VENOUS BLD VENIPUNCTURE: CPT | Performed by: PHYSICIAN ASSISTANT

## 2022-09-30 PROCEDURE — 96372 THER/PROPH/DIAG INJ SC/IM: CPT | Performed by: ORTHOPAEDIC SURGERY

## 2022-09-30 PROCEDURE — 84295 ASSAY OF SERUM SODIUM: CPT | Performed by: PHYSICIAN ASSISTANT

## 2022-09-30 PROCEDURE — 250N000011 HC RX IP 250 OP 636: Performed by: ORTHOPAEDIC SURGERY

## 2022-09-30 PROCEDURE — 36415 COLL VENOUS BLD VENIPUNCTURE: CPT | Performed by: ORTHOPAEDIC SURGERY

## 2022-09-30 PROCEDURE — 97116 GAIT TRAINING THERAPY: CPT | Mod: GP

## 2022-09-30 PROCEDURE — 82962 GLUCOSE BLOOD TEST: CPT

## 2022-09-30 PROCEDURE — 99225 PR SUBSEQUENT OBSERVATION CARE,LEVEL II: CPT | Performed by: PHYSICIAN ASSISTANT

## 2022-09-30 PROCEDURE — 84439 ASSAY OF FREE THYROXINE: CPT | Performed by: HOSPITALIST

## 2022-09-30 PROCEDURE — 85027 COMPLETE CBC AUTOMATED: CPT | Performed by: PHYSICIAN ASSISTANT

## 2022-09-30 RX ORDER — HYDROMORPHONE HYDROCHLORIDE 2 MG/1
2-4 TABLET ORAL
Status: DISCONTINUED | OUTPATIENT
Start: 2022-09-30 | End: 2022-09-30 | Stop reason: HOSPADM

## 2022-09-30 RX ORDER — ACETAMINOPHEN 325 MG/1
650 TABLET ORAL EVERY 6 HOURS PRN
Qty: 45 TABLET | Refills: 0 | Status: SHIPPED | OUTPATIENT
Start: 2022-09-30 | End: 2023-03-03

## 2022-09-30 RX ORDER — SODIUM CHLORIDE 9 MG/ML
INJECTION, SOLUTION INTRAVENOUS CONTINUOUS
Status: DISCONTINUED | OUTPATIENT
Start: 2022-09-30 | End: 2022-09-30 | Stop reason: HOSPADM

## 2022-09-30 RX ORDER — POLYETHYLENE GLYCOL 3350 17 G/17G
17 POWDER, FOR SOLUTION ORAL DAILY PRN
Qty: 30 PACKET | Refills: 0 | Status: SHIPPED | OUTPATIENT
Start: 2022-09-30 | End: 2022-10-12

## 2022-09-30 RX ORDER — DIPHENHYDRAMINE HCL 25 MG
25 CAPSULE ORAL EVERY 6 HOURS PRN
Qty: 30 CAPSULE | Refills: 0 | Status: SHIPPED | OUTPATIENT
Start: 2022-09-30 | End: 2022-12-14

## 2022-09-30 RX ORDER — OXYCODONE HYDROCHLORIDE 5 MG/1
5-10 TABLET ORAL EVERY 4 HOURS PRN
Qty: 30 TABLET | Refills: 0 | Status: SHIPPED | OUTPATIENT
Start: 2022-09-30 | End: 2022-09-30

## 2022-09-30 RX ORDER — HYDROMORPHONE HYDROCHLORIDE 2 MG/1
2-4 TABLET ORAL
Qty: 30 TABLET | Refills: 0 | Status: SHIPPED | OUTPATIENT
Start: 2022-09-30 | End: 2022-10-12

## 2022-09-30 RX ADMIN — SENNOSIDES AND DOCUSATE SODIUM 1 TABLET: 50; 8.6 TABLET ORAL at 09:06

## 2022-09-30 RX ADMIN — SODIUM CHLORIDE, POTASSIUM CHLORIDE, SODIUM LACTATE AND CALCIUM CHLORIDE: 600; 310; 30; 20 INJECTION, SOLUTION INTRAVENOUS at 00:50

## 2022-09-30 RX ADMIN — OXYCODONE HYDROCHLORIDE 10 MG: 5 TABLET ORAL at 04:43

## 2022-09-30 RX ADMIN — HYDROXYZINE HYDROCHLORIDE 25 MG: 25 TABLET ORAL at 10:23

## 2022-09-30 RX ADMIN — HYDROMORPHONE HYDROCHLORIDE 4 MG: 2 TABLET ORAL at 13:00

## 2022-09-30 RX ADMIN — CEFAZOLIN 1 G: 1 INJECTION, POWDER, FOR SOLUTION INTRAMUSCULAR; INTRAVENOUS at 09:05

## 2022-09-30 RX ADMIN — ACETAMINOPHEN 975 MG: 325 TABLET, FILM COATED ORAL at 13:00

## 2022-09-30 RX ADMIN — CEFAZOLIN 1 G: 1 INJECTION, POWDER, FOR SOLUTION INTRAMUSCULAR; INTRAVENOUS at 01:36

## 2022-09-30 RX ADMIN — POLYETHYLENE GLYCOL 3350 17 G: 17 POWDER, FOR SOLUTION ORAL at 09:06

## 2022-09-30 RX ADMIN — FAMOTIDINE 20 MG: 20 TABLET ORAL at 09:06

## 2022-09-30 RX ADMIN — ENOXAPARIN SODIUM 40 MG: 40 INJECTION SUBCUTANEOUS at 13:00

## 2022-09-30 RX ADMIN — OXYCODONE HYDROCHLORIDE 10 MG: 5 TABLET ORAL at 09:13

## 2022-09-30 RX ADMIN — ACETAMINOPHEN 975 MG: 325 TABLET, FILM COATED ORAL at 05:51

## 2022-09-30 RX ADMIN — SODIUM CHLORIDE: 9 INJECTION, SOLUTION INTRAVENOUS at 10:16

## 2022-09-30 ASSESSMENT — ACTIVITIES OF DAILY LIVING (ADL)
ADLS_ACUITY_SCORE: 33
ADLS_ACUITY_SCORE: 31
ADLS_ACUITY_SCORE: 33
ADLS_ACUITY_SCORE: 33
ADLS_ACUITY_SCORE: 31
ADLS_ACUITY_SCORE: 31
ADLS_ACUITY_SCORE: 33

## 2022-09-30 NOTE — PROGRESS NOTES
UPDATE:    Patient discharging home this afternoon.  Returned to patient's room and discussed/advised follow-up with primary care provider with [facility].  She was advised to stay hydrated with water.  SW/YARITZA working on setting patient up with PCP as she does not have one.      Gorge Mcadams PA-C

## 2022-09-30 NOTE — PLAN OF CARE
VSS, CMS intact. Up with 1 and walker. Dressing C/D/I. Rating pain 7-10 after 2 doses of oxycodone, changed to oral dilaudid. Normal saline infusign for Sodium level of 128. A&OX4. Possible discharge home today if pain under control and sodium level comes back normal.

## 2022-09-30 NOTE — PROGRESS NOTES
Orthopedic Surgery  Leanne Valentino  2022  Admit Date:  2022  POD 1 Day Post-Op  S/P Procedure(s):  OPEN REDUCTION INTERNAL FIXATION RIGHT TIBIA.    Patient resting comfortably in bed.    Pain controlled.  Tolerating oral intake.    Denies nausea or vomiting  Denies chest pain or shortness of breath  No events overnight.     Alert and orient to person, place, and time.  Vital Sign Ranges  Temperature Temp  Av.1  F (37.3  C)  Min: 97  F (36.1  C)  Max: 101  F (38.3  C)   Blood pressure Systolic (24hrs), Av , Min:101 , Max:124        Diastolic (24hrs), Av, Min:53, Max:74      Pulse Pulse  Av.1  Min: 55  Max: 98   Respirations Resp  Av.6  Min: 8  Max: 21   Pulse oximetry SpO2  Av.8 %  Min: 92 %  Max: 100 %       Dressing is clean, dry, and intact. CAM boot worn  Bilateral calves are soft, non-tender.  Bilateral lower extremity is NVI.  Sensation intact bilateral lower extremities  Able to actively move toes  +Dp pulse    Labs:  Recent Labs   Lab Test 22  0754 22  0744   POTASSIUM 3.9 4.0     Recent Labs   Lab Test 22  0754 22  0744   HGB 10.1*  10.1* 11.4*     No results for input(s): INR in the last 00318 hours.  Recent Labs   Lab Test 22  0754 22  0744    232       A/P  1. S/p right distal tibia open reduction and internal fixation    Continue Lovenox for DVT prophylaxis.     Mobilize with PT/OT    Non-WB right LE   Leave dressing intact   Leave CAM boot on at all times.     Continue current pain regiment.    2. Disposition   Anticipate d/c to home today.    Alicja Feldman PA-C

## 2022-09-30 NOTE — OP NOTE
Procedure Date: 09/29/2022    PREOPERATIVE DIAGNOSES:  Right distal tibia and fibular fractures.    POSTOPERATIVE DIAGNOSES:  Right distal tibia and fibular fractures.    PROCEDURE:  Open reduction and internal fixation of right distal tibia and fibular fracture.    SURGEON:  Raj Gallardo MD    FIRST ASSISTANT:  Cyndi Helms PA-C.    INDICATIONS FOR PROCEDURE:  Ms. Valentino is a very pleasant 57-year-old female who this early morning, she tripped over her dog, landing on her right leg and suffering this fracture.  She was unable to ambulate, taken to [facility] where she was diagnosed with a right distal tibia and fibular fractures and admitted for definitive care.  We had a long discussion of treatment options.  I recommended open reduction and internal fixation.  She understands the risks, benefits and alternatives and wished to proceed with surgery.    NARRATIVE EVENTS:  After thorough evaluation and proper identification of the patient to be operated on, Ms. Valentino was taken to the operating room where she underwent general anesthetic as well as a saphenous, sural and tibial nerve blocks.  She was placed supine on the operating table and her right leg was prepped and draped in the usual sterile manner.  After appropriate surgical pause confirming the patient to be operated on, 10 minutes after received 2 grams of Ancef, her right leg was elevated and tourniquet was raised to 300 mmHg on her right upper thigh.  We approached the right leg initially with an anterior incision over the distal tibia.  The skin and soft tissue sharply dissected down to the anterior muscle compartment, which was then split and we took care to protect the superficial peroneal nerve, which crossed the field just superior and lateral to the ankle joint.  We exposed the interval between the tibialis anterior flexor and extensor hallucis longus tendons and this brought us right down onto the anterior tibia where the  fracture was easily identified.  We were able to reduce it anatomically and clamp into position and then placed 2 screws in lag fashion from medial to lateral and proximal to distal across the fracture.  This fixated the fracture and stabilized it nicely.  We then placed the Synthes anterolateral tibial locking plate anterior and laterally on the tibia, such that we were able to place five, 2.7 locking screws distal to the fracture as well as a small fragment locking screw and then proximally, we were able to place 3 screws proximal to the fracture.  These were two of these were locking screws and 1 interfragmentary screw, which reduced the fracture proximally quite nicely.  we felt that after doing this, we had stable fixation and an appropriately balanced and the hardware was in good position when checked under biplanar fluoroscopy.  At this point, we then paid our attention to the fibula, which did reduce quite nicely.  We made a small lateral incision centered over the fibula, making sure to maintain a 6 cm skin bridge between that and the anterior incision.  This incision was along the posterior border of the fibula centered over the fracture.  The skin and soft tissue sharply dissected down to the fracture, which was easily identified.  It was reduced relatively nicely as is.  We placed the 3-hole hole Synthes distal fibular locking plate in position with 3 screws, 2 of them locking screws proximal to the fracture and 4 locking screws distal to the fracture.  This gave us good solid fixation of this fracture as well.  Once this was done, we then thoroughly irrigated the wounds, checked the position of the hardware and fracture reduction using biplanar fluoroscopy, felt to be an anatomic reduction and appropriate hardware placement.  We thoroughly irrigated each wound one more time.  We closed the wounds in layers with absorbable sutures and staples in the anterior incision and nylon sutures in the lateral  incision.  The patient was placed in a well-padded postop dressing and a Cam boot, taken to recovery room in stable condition.  She tolerated the procedure without difficulty.    Raj Gallardo MD        D: 2022   T: 2022   MT: ENOC    Name:     JUAN MANUEL PERDUE  MRN:      [id number]        Account:        [id number]   :      1965           Procedure Date: 2022     Document: [id number]

## 2022-09-30 NOTE — PLAN OF CARE
Goal Outcome Evaluation:       Patient vital signs are at baseline: Yes, VSS, A&OX4. LLE CMS intact, small numbness still exit on RLE per pt.  Patient able to ambulate as they were prior to admission or with assist devices provided by therapies during their stay:  NO, not OOB  Yet, CAM boot, CDI  Patient MUST void prior to discharge:  Yes, voiding pure wick in place.  Patient able to tolerate oral intake:  Yes, IV saline locked  Pain has adequate pain control using Oral analgesics:  Yes, initially denies pain at over night until this morning at 0443 rated pain 7/10, received oxycodone 10mg and scheduled tylenol, effective.  Does patient have an identified :  Yes  Has goal D/C date and time been discussed with patient:  Yes, expected discharge 9/30, pending

## 2022-09-30 NOTE — PROGRESS NOTES
09/30/22 0920   Quick Adds   Quick Adds Certification   Type of Visit Initial PT Evaluation       Present no   Living Environment   People in Home alone   Current Living Arrangements house   Home Accessibility stairs to enter home   Number of Stairs, Main Entrance 2   Stair Railings, Main Entrance railings on both sides of stairs   Transportation Anticipated car, drives self;family or friend will provide   Living Environment Comments Sister could stay with pt. Sister also offered to have pt stay with her at her house for a week. Pt's sister's home does not have any stairs to enter and has all emenities on 1 level once inside. Pt lives in single story home with 2 stairs to enter with handrails on both sides.   Self-Care   Usual Activity Tolerance good   Current Activity Tolerance moderate   Regular Exercise Yes   Activity/Exercise Type walking   Equipment Currently Used at Home none   Fall history within last six months yes   Number of times patient has fallen within last six months 1   Activity/Exercise/Self-Care Comment Pt has crutches but no other medical equipment at home. Pt IND with all ADLs prior to admission.   General Information   Onset of Illness/Injury or Date of Surgery 09/29/22   Referring Physician Raj Gallardo MD   Patient/Family Therapy Goals Statement (PT) return home   Pertinent History of Current Problem (include personal factors and/or comorbidities that impact the POC) Pt is POD#1 Open reduction and internal fixation of right distal tibia and fibular fracture following a mechanical fall.   Existing Precautions/Restrictions weight bearing   Weight-Bearing Status - RLE nonweight-bearing  (w/ CAM boot)   Cognition   Affect/Mental Status (Cognition) WNL   Orientation Status (Cognition) oriented x 4   Cognitive Status Comments Pt able to follow all commands and answer all questions throughout evaluation.   Integumentary/Edema   Integumentary/Edema Comments R  ankle within CAM boot throughout session. No other integument deficits noted   Posture    Posture Not impaired   Range of Motion (ROM)   ROM Comment UE and LE AROM WFL   Strength (Manual Muscle Testing)   Strength Comments LLE strength WFL, RLE mild strength impairment noted with partial ROM SLR capabilities   Bed Mobility   Comment, (Bed Mobility) supine>sit mod I   Transfers   Comment, (Transfers) sit>stand w/ SBA and FWW   Gait/Stairs (Locomotion)   Distance in Feet (Required for LE Total Joints) 10' eval   Comment, (Gait/Stairs) Pt ambulated 10' for eval w/ FWW. Pt does a good job of adhering to non-weight bearing precautions on RLE. demonstrating good UE strength with use of FWW   Balance   Balance no deficits were identified   Sensory Examination   Sensory Perception patient reports no sensory changes   Coordination   Coordination no deficits were identified   Muscle Tone   Muscle Tone no deficits were identified   Clinical Impression   Criteria for Skilled Therapeutic Intervention Yes, treatment indicated   PT Diagnosis (PT) impaired functional mobility   Influenced by the following impairments mild RLE strength deficit, increased post-op pain levels, impaired activity tolerance   Functional limitations due to impairments limited ability to perform mobility independently   Clinical Presentation (PT Evaluation Complexity) Stable/Uncomplicated   Clinical Presentation Rationale clinical judgement   Clinical Decision Making (Complexity) low complexity   Planned Therapy Interventions (PT) balance training;bed mobility training;gait training;patient/family education;stair training;strengthening;transfer training;progressive activity/exercise   Anticipated Equipment Needs at Discharge (PT) walker, rolling   Risk & Benefits of therapy have been explained evaluation/treatment results reviewed;care plan/treatment goals reviewed;risks/benefits reviewed;current/potential barriers reviewed;participants voiced agreement  with care plan;participants included;patient   PT Discharge Planning   PT Discharge Recommendation (DC Rec) home with assist;home with outpatient physical therapy   PT Rationale for DC Rec Pt presenting below baseline mobility levels at this time, but demonstrating good safety with mobility. Demonstrating either mod I or SBA for bed mobility, transfers, and ambulation. CGA for stair navigation. Pt has 2 stairs to enter home and none once inside. Pt stated that sister can stay with her when she returns home or her sister offered for pt to stay at her house to. Pt's sister's home has no stairs to enter and all emenities on one level. Recommending continued outpatient PT to continue increasing independence with functional mobility.   PT Brief overview of current status mod I w/ bed mobility, SBA for ambulation w/ FWW or crutches, CGA for stair navigation w/ crutches   Plan of Care Review   Plan of Care Reviewed With patient   Therapy Certification   Start of care date 09/30/22   Certification date from 09/30/22   Certification date to 10/07/22   Medical Diagnosis ORIF of right distal tibia and fibular fracture   Total Evaluation Time   Total Evaluation Time (Minutes) 5   Physical Therapy Goals   PT Frequency Daily   PT Predicted Duration/Target Date for Goal Attainment 10/05/22   PT Goals Bed Mobility;Transfers;Gait;Stairs   PT: Bed Mobility Independent;Supine to/from sit   PT: Transfers Supervision/stand-by assist;Sit to/from stand;Within precautions;Assistive device   PT: Gait Supervision/stand-by assist;Rolling walker;100 feet;Within precautions   PT: Stairs Supervision/stand-by assist;Assistive device;Within precautions;2 stairs;Rail on both sides

## 2022-09-30 NOTE — PLAN OF CARE
[facility]      OUTPATIENT PHYSICAL THERAPY EVALUATION  PLAN OF TREATMENT FOR OUTPATIENT REHABILITATION  (COMPLETE FOR INITIAL CLAIMS ONLY)  Patient's Last Name, First Name, M.I.  YOB: 1965  Leanne Valentino                        Provider's Name  [facility] Medical Record No.  [id number]                               Onset Date:  09/29/22   Start of Care Date:  09/30/22      Type:     _X_PT   ___OT   ___SLP Medical Diagnosis:  ORIF of right distal tibia and fibular fracture                        PT Diagnosis:  impaired functional mobility   Visits from SOC:  1   _________________________________________________________________________________  Plan of Treatment/Functional Goals    Planned Interventions: balance training, bed mobility training, gait training, patient/family education, stair training, strengthening, transfer training, progressive activity/exercise     Goals: See Physical Therapy Goals on Care Plan in Transfer Course Computer System ([place]) electronic health record.    Therapy Frequency: Daily  Predicted Duration of Therapy Intervention: 10/05/22  _________________________________________________________________________________    I CERTIFY THE NEED FOR THESE SERVICES FURNISHED UNDER        THIS PLAN OF TREATMENT AND WHILE UNDER MY CARE     (Physician co-signature of this document indicates review and certification of the therapy plan).              Certification date from: 09/30/22, Certification date to: 10/07/22    Referring Physician: Raj Gallardo MD            Initial Assessment        See Physical Therapy evaluation dated 09/30/22 in Epic electronic health record.

## 2022-09-30 NOTE — PLAN OF CARE
Goal Outcome Evaluation:    Patient transferred from PACU @ 2130. A&O x4. VSS on RA. Dressing CDI, numbness present on RLE, complains of pain on right thigh, managed with IV dilaudid and ice application. IVF infusing. Purewick in place. Will continue to monitor.

## 2022-09-30 NOTE — PROGRESS NOTES
[facility]    Hospitalist Progress Note    Assessment & Plan   Leanne Valentino is a 57 year old female admitted on 9/29/2022.    Past medical history significant for History of herniated lumbar disc, MDD, History of Leiomyoma with Dysmenorrhea and Menorrhagia who was admitted under Orthopedic service due to right distal tibia/fibula fractures.      Patient presented to [facility] on 9/29/2022 due to a right leg injury.  Patient reported that she tripped over her dog the previous night after going to the bathroom.  Patient was evaluated in the ED by Dr. Blackwood.  Evaluation included a BMP that revealed a sodium of 130, creatinine of 0.5 with a GFR greater than 90 otherwise within normal limits.  A CBC with differential revealed a WBC of 7.3, hemoglobin of 11.4, hematocrit of 33.8, platelets of 232, RBC of 3.57, absolute lymphocytes of 0.3 otherwise within normal limits.  COVID-19 PCR was negative.  To review x-ray of the right tibia and fibula revealed acute mildly displaced oblique fracture through the distal tibia with likely intra-articular extension and also with an acute oblique mild to moderately displaced fracture of the distal fibula with recommendations for ankle radiography to better assess the tibial talar articulation (moderate soft tissue edema was noted throughout the lower extremity).  On-call orthopedic surgery was contacted and recommended admission to the hospital.  Patient received 4 mg of IV morphine seen and 0.5 mg of IV Dilaudid.  Orthopedics ordered a right lower extremity tibia-fibula CT without contrast that revealed spiral distal tibial shaft fracture with mild displacement, comminuted distal fibular/lateral malleolus fracture with extension into the inferior lateral ankle syndesmosis and small nondisplaced posterior malleolus fracture with extension into the tibiotalar joint.    Hospitalist were consulted to assist with preoperative clearance.      Mechanical  fall  Traumatic acute right distal tibia/fibula fracture  S/p ORIF of right distal tibia/fibular fracture (POD #1)  Normally woks out 3-4 times per week (low intensity cardio).  EKG: Sinus rhythm without ischemic changes and noted inverted T-wave in V1.    - Orthopedic Surgery is managing.   --Defer analgesic management, DVT prophylaxis, PT/OT.     - Encourage utilization of incentive spirometer.     Intra-op fever  While in pre-op patient developed a fever of 101.  When assessed earlier she had no signs/symptoms of infection.  Patient has since been afebrile and no leukocytosis.    *POD #1 assessment without noted without signs/symptoms of infection.  - Monitor.      Hyponatremia (Worsened)  - IV Fluids with NS at 100 ml/hr.    - Check Na this afternoon and repeat BMP in the morning.    - Check Uric acid level, urine osmolality, and urine sodium.     --Uric acid WNL at 4.3.  Awaiting urine studies as this might be SIADH and would need to stop IV Fluids and start fluid restriction.      Mild anemia, normocytic unclear duration  HGB at 11.4 upon admission.  No recent HGB's to compare with.    POD #1 HGB at 10.1; suspect secondary to surgical blood loss and dilutional effect.    - Monitor.      Tobacco use D/O  Currently smoking 1 p/d.    - Encouraged cessation.    - Nicoderm patch offered and patient declined.      Heart murmur  *ECHO completed and without wall motion abnormalities and LV function normal with EF of 65-70%.  No aortic stenosis present and mild aortic sclerosis possible.  Trace TR present.  *Reviewed ECHO findings with patient.      History of herniated lumbar disc  Noted per EMR and s/p L4-L5 laminectomy/discectomy, currently not on any medications    History of MDD  Not currently on any medications but noted on EMR.      History of Leiomyoma with Dysmenorrhea and Menorrhagia  Noted per EMR and s/p leiomomectomy and no interventions at this time.          Clinically Significant Risk Factors Present on  "Admission         # Hyponatremia: Na = 131 mmol/L (Ref range: 133 - 144 mmol/L) on admission, will monitor as appropriate                    Diet: Advance Diet as Tolerated: Regular Diet Adult     DVT Prophylaxis: Defer to primary service   Lara Catheter: Not present  Central Lines: None  Cardiac Monitoring: None  Code Status: Full Code      Disposition Plan    Per Ortho.         The patient's care was discussed with the Bedside Nurse and Patient.      The patient has been discussed with Dr. Dai, who agrees with the assessment and plan at this time.    Kilo Mcadams PA-C  [facility]  Securely message with the Vocera Web Console (learn more here)  Text page via [employer] Paging/Directory      Interval History   Patient was seated in a chair upon arrival.  She denied fevers, chills, chest pain, shortness of breath or abdominal pain.  She has not passed flatus or had a bowel movement.  She has been urinating without difficulty and denied dysuria or frequency.  She denied cough and her pain is adequately managed.      Reviewed plan for the day regarding low sodium and having PT/OT assessment.      -Data reviewed today: I reviewed all new labs and imaging results over the last 24 hours. I personally reviewed no images or EKG's today.    Physical Exam   /62 (BP Location: Right arm)   Pulse 55   Temp 98.6  F (37  C) (Oral)   Resp 16   Ht 1.6 m (5' 3\")   Wt 59 kg (130 lb)   SpO2 97%   BMI 23.03 kg/m        Constitutional: Awake, alert, cooperative, NAD.    ENT: NCAT, oral pharynx with moist mucus membranes, tonsils without erythema or exudates.  Neck: Supple, symmetrical, trachea midline, no adenopathy.  Pulmonary: No increased work of breathing, fair air exchange, clear to auscultation bilaterally, no crackles or wheezing.  Cardiovascular: Regular rate and rhythm, normal S1 and S2, no S3 or S4, and no murmur noted.  GI: Bowel sounds, soft, non-distended, " non-tender.  Skin/Integumen: Visualized skin appeared clear.  Neuro: CN II-XII grossly intact.  Psych:  Alert and oriented x 3. Normal affect.  Extremities: No lower extremity edema noted.  Right lower extremity in a walking/air cast left lower extremity without tenderness to palpation.        Medications     sodium chloride 100 mL/hr at 22 1016       acetaminophen  975 mg Oral Q8H     enoxaparin ANTICOAGULANT  40 mg Subcutaneous Q24H     famotidine  20 mg Oral BID    Or     famotidine  20 mg Intravenous BID     polyethylene glycol  17 g Oral Daily     senna-docusate  1 tablet Oral BID     sodium chloride (PF)  3 mL Intracatheter Q8H       Data   Recent Labs   Lab 22  0754 22  0548 22  1501 22  0744   WBC 5.9  --   --  7.3   HGB 10.1*  10.1*  --   --  11.4*   MCV 98  --   --  95     --   --  232   *  --  131* 130*   POTASSIUM 3.9  --   --  4.0   CHLORIDE 98  --   --  99   CO2 22  --   --  22   BUN 7  --   --  7   CR 0.52  0.52  --   --  0.50*   ANIONGAP 8  --   --  9   ALICE 8.2*  --   --  8.6   GLC 84 98  --  99       Recent Results (from the past 24 hour(s))   Echocardiogram Complete   Result Value    LVEF  65-70%    formerly [facility]    [id number]  [id number]  [id number]  [id number]^NORA^CANELO^KARON     [facility]  Echocardiography Laboratory  [address] [place] [address]     Name: JUAN MANUEL PERDUE  MRN: [id number]  : 1965  Study Date: 2022 01:43 PM  Age: 57 yrs  Gender: Female  Patient Location: [facility]  Reason For Study: Cardiac Murmur  Ordering Physician: CANELO MELENDEZ  Referring Physician: CANELO MELENDEZ  Performed By: MARLENY Ramirez     BSA: 1.6 m2  Height: 63 in  Weight: 130 lb  HR: 99  BP: 124/73 mmHg  ______________________________________________________________________________  Procedure  Complete Portable Echo Adult. [employer]ison (NDC #[id number]) given  intravenously.  ______________________________________________________________________________  Interpretation Summary     Left ventricular systolic function is normal.  No regional wall motion abnormalities noted.  The visual ejection fraction is 65-70%.  The study was technically difficult. There is no comparison study available.  ______________________________________________________________________________  Left Ventricle  The left ventricle is normal in size. There is normal left ventricular wall  thickness. Left ventricular systolic function is normal. The visual ejection  fraction is 65-70%. Diastolic Doppler findings (E/E' ratio and/or other  parameters) suggest left ventricular filling pressures are normal. No regional  wall motion abnormalities noted.     Right Ventricle  The right ventricle is normal size. The right ventricular systolic function is  normal.     Atria  Normal left atrial size. Right atrial size is normal.     Mitral Valve  There is trace mitral regurgitation.     Tricuspid Valve  There is trace tricuspid regurgitation. Right ventricular systolic pressure  could not be approximated due to inadequate tricuspid regurgitation. IVC  diameter <2.1 cm collapsing >50% with sniff suggests a normal RA pressure of 3  mmHg.     Aortic Valve  The aortic valve is not well visualized. MIld sclerosis possible. No aortic  regurgitation is present. No aortic stenosis is present.     Pulmonic Valve  The pulmonic valve is not well visualized.     Vessels  The aortic root is normal size.     Pericardium  There is no pericardial effusion.     Rhythm  Sinus rhythm was noted.  ______________________________________________________________________________  MMode/2D Measurements & Calculations  IVSd: 0.90 cm     LVIDd: 4.1 cm  LVIDs: 3.4 cm  LVPWd: 0.90 cm  FS: 18.2 %  LV mass(C)d: 115.2 grams  LV mass(C)dI: 71.5 grams/m2  Ao root diam: 2.8 cm  LVOT diam: 1.6 cm  LVOT area: 2.0 cm2  LA Volume Indexed (AL/bp): 18.9  ml/m2  RWT: 0.44     Doppler Measurements & Calculations  MV E max guillermo: 114.0 cm/sec  MV A max guillermo: 116.0 cm/sec  MV E/A: 0.98     MV max P.5 mmHg  MV mean PG: 3.0 mmHg  MV V2 VTI: 27.5 cm  MVA(VTI): 2.0 cm2  MV dec time: 0.21 sec  LV V1 max P.3 mmHg  LV V1 max: 189.0 cm/sec  LV V1 VTI: 26.9 cm  SV(LVOT): 54.1 ml  SI(LVOT): 33.6 ml/m2  Pulm Sys Guillermo: 81.1 cm/sec  Pulm Haddad Guillermo: 60.5 cm/sec  Pulm A Revs Guillermo: 51.8 cm/sec  Pulm S/D: 1.3  E/E' av.1  Lateral E/e': 7.0  Medial E/e': 7.2     ______________________________________________________________________________  Report approved by: Hoda Mata 2022 02:41 PM         XR Surgery DORIS L/T 5 Min Fluoro    Narrative    This exam was marked as non-reportable because it will not be read by a   radiologist or a [place] non-radiologist provider.

## 2022-09-30 NOTE — PROGRESS NOTES
[id number]. Pt was discharged on 9/30/22 at 1650. Pt was medically stable upon discharge. Previous nurse had reviewed her AVS with her. New printed AVS for PCP with scheduled appointment was given and reviewed with pt. She verbally admitted to understanding the instructions. Pt was accompanied to home by a friend.

## 2022-09-30 NOTE — ANESTHESIA POSTPROCEDURE EVALUATION
Patient: Leanne Valentino    Procedure: Procedure(s):  OPEN REDUCTION INTERNAL FIXATION RIGHT TIBIA.       Anesthesia Type:  General    Note:     Postop Pain Control: Uneventful            Sign Out: Well controlled pain   PONV: No   Neuro/Psych: Uneventful            Sign Out: Acceptable/Baseline neuro status   Airway/Respiratory: Uneventful            Sign Out: Acceptable/Baseline resp. status   CV/Hemodynamics: Uneventful            Sign Out: Acceptable CV status; No obvious hypovolemia; No obvious fluid overload   Other NRE: NONE   DID A NON-ROUTINE EVENT OCCUR? No           Last vitals:  Vitals Value Taken Time   /54 09/29/22 2050   Temp 37.7  C (99.8  F) 09/29/22 2011   Pulse 80 09/29/22 2059   Resp 12 09/29/22 2059   SpO2 98 % 09/29/22 2059   Vitals shown include unvalidated device data.    Electronically Signed By: Gary Smyth MD  September 29, 2022  9:01 PM

## 2022-09-30 NOTE — DISCHARGE SUMMARY
Discharge Summary    Leanne Valentino MRN# [id number]   YOB: 1965 Age: 57 year old     Date of Admission:  9/29/2022  Date of Discharge:  9/30/2022  Admitting Physician:  Raj Gallardo MD  Discharge Physician:  Alicja Feldman PA-C     Primary Provider: No Ref-Primary, [id number]          Admission Diagnoses:   Right distal tibia and fibular fracture          Discharge Diagnosis:   Same           Surgical Procedure:   Open reduction and internal fixation distal tibia and fibula fracture           Secondary Diagnosis:     Patient Active Problem List   Diagnosis     Elderly primigravida     Dysmenorrhea     Leiomyoma of uterus     ASCUS on Pap smear     Mild recurrent major depression (H)     Menorrhagia     CARDIOVASCULAR SCREENING; LDL GOAL LESS THAN 160              Discharge Disposition:   Discharged to home           Medications Prior to Admission:     Medications Prior to Admission   Medication Sig Dispense Refill Last Dose     COLLAGEN PO Take 1 tablet by mouth daily   9/28/2022 at AM     fish oil-omega-3 fatty acids 1000 MG capsule Take 1 g by mouth daily   9/28/2022 at AM     hypromellose (GENTEAL) 0.3 % SOLN ophthalmic solution Place 1 drop into both eyes every hour as needed for dry eyes   Past Week at PRN     multivitamin, therapeutic (THERA-VIT) TABS tablet Take 1 tablet by mouth daily   9/28/2022 at AM     Vitamin D3 (CHOLECALCIFEROL) 125 MCG (5000 UT) tablet Take 125 mcg by mouth daily   9/28/2022 at AM             Discharge Medications:     Current Discharge Medication List      START taking these medications    Details   acetaminophen (TYLENOL) 325 MG tablet Take 2 tablets (650 mg) by mouth every 6 hours as needed for other (For optimal non-opioid multimodal pain management to improve pain control.)  Qty: 45 tablet, Refills: 0    Associated Diagnoses: Tibia/fibula fracture, right, closed, initial encounter      aspirin (ASA) 325 MG EC tablet Take 1 tablet (325 mg) by  mouth daily  Qty: 30 tablet, Refills: 0    Associated Diagnoses: Tibia/fibula fracture, right, closed, initial encounter      diphenhydrAMINE (BENADRYL) 25 MG capsule Take 1 capsule (25 mg) by mouth every 6 hours as needed for itching (anxiety)  Qty: 30 capsule, Refills: 0    Associated Diagnoses: Tibia/fibula fracture, right, closed, initial encounter      HYDROmorphone (DILAUDID) 2 MG tablet Take 1-2 tablets (2-4 mg) by mouth every 3 hours as needed for moderate to severe pain (one tab for moderate pain (4-6 pain scale), two tabs for severe pain (7-10 pain scale))  Qty: 30 tablet, Refills: 0    Associated Diagnoses: Tibia/fibula fracture, right, closed, initial encounter      polyethylene glycol (MIRALAX) 17 g packet Take 17 g by mouth daily as needed for constipation  Qty: 30 packet, Refills: 0    Associated Diagnoses: Tibia/fibula fracture, right, closed, initial encounter         CONTINUE these medications which have NOT CHANGED    Details   COLLAGEN PO Take 1 tablet by mouth daily      fish oil-omega-3 fatty acids 1000 MG capsule Take 1 g by mouth daily      hypromellose (GENTEAL) 0.3 % SOLN ophthalmic solution Place 1 drop into both eyes every hour as needed for dry eyes      multivitamin, therapeutic (THERA-VIT) TABS tablet Take 1 tablet by mouth daily      Vitamin D3 (CHOLECALCIFEROL) 125 MCG (5000 UT) tablet Take 125 mcg by mouth daily                   Consultations:   Consultation during this admission received from hospitalist service            Hospital Course:   The patient was admitted from the emergency room. Patient states that she tripped over her dog last night (9/28) after going to the bathroom. The patient underwent an uneventful ORIF distal tibia and fibula fracture. Postoperatively, anticoagulation  with Lovenox was started. No transfusion was required. The patient will be discharged to home. Home medications have been reconciled. Dilaudid 2 mg was prescribed for pain. Aspirin  will be  prescribed.             Pending Results:   None           Discharge Instructions and Follow-Up:        Discharge activity: no weight on right side foot   Discharge follow-up: Follow up with Dr. Gallardo in 14 days   Outpatient therapy: None    Home Care agency: None    Supplies and equipment: None        Wound care: do not remove splint or cast   Other instructions: None

## 2022-10-01 NOTE — PLAN OF CARE
"Physical Therapy Discharge Summary    Reason for therapy discharge:    Discharged to home.    Progress towards therapy goal(s). See goals on Care Plan in [facility] electronic health record for goal details.  Goals not met.  Barriers to achieving goals:   discharge on same date as initial evaluation.    Therapy recommendation(s):    Continued therapy is recommended.  Rationale/Recommendations:  Per PT notes, \" Pt presenting below baseline mobility levels at this time, but demonstrating good safety with mobility. Demonstrating either mod I or SBA for bed mobility, transfers, and ambulation. CGA for stair navigation. Pt has 2 stairs to enter home and none once inside. Pt stated that sister can stay with her when she returns home or her sister offered for pt to stay at her house to. Pt's sister's home has no stairs to enter and all emenities on one level. Recommending continued outpatient PT to continue increasing independence with functional mobility.\".      "

## 2022-10-04 LAB
ATRIAL RATE - MUSE: 89 BPM
DIASTOLIC BLOOD PRESSURE - MUSE: NORMAL MMHG
INTERPRETATION ECG - MUSE: NORMAL
P AXIS - MUSE: 60 DEGREES
PR INTERVAL - MUSE: 126 MS
QRS DURATION - MUSE: 88 MS
QT - MUSE: 346 MS
QTC - MUSE: 420 MS
R AXIS - MUSE: 59 DEGREES
SYSTOLIC BLOOD PRESSURE - MUSE: NORMAL MMHG
T AXIS - MUSE: 56 DEGREES
VENTRICULAR RATE- MUSE: 89 BPM

## 2022-10-12 ENCOUNTER — OFFICE VISIT (OUTPATIENT)
Dept: FAMILY MEDICINE | Facility: CLINIC | Age: 57
End: 2022-10-12
Payer: COMMERCIAL

## 2022-10-12 VITALS
SYSTOLIC BLOOD PRESSURE: 126 MMHG | WEIGHT: 130 LBS | DIASTOLIC BLOOD PRESSURE: 85 MMHG | HEART RATE: 91 BPM | BODY MASS INDEX: 23.04 KG/M2 | HEIGHT: 63 IN | TEMPERATURE: 97.5 F | OXYGEN SATURATION: 100 %

## 2022-10-12 DIAGNOSIS — R79.89 LOW TSH LEVEL: ICD-10-CM

## 2022-10-12 DIAGNOSIS — S82.209P: Primary | ICD-10-CM

## 2022-10-12 DIAGNOSIS — E87.1 HYPONATREMIA: ICD-10-CM

## 2022-10-12 DIAGNOSIS — F17.210 CIGARETTE SMOKER: ICD-10-CM

## 2022-10-12 DIAGNOSIS — D64.9 ANEMIA, UNSPECIFIED TYPE: ICD-10-CM

## 2022-10-12 DIAGNOSIS — Z98.890 S/P ORIF (OPEN REDUCTION INTERNAL FIXATION) FRACTURE: ICD-10-CM

## 2022-10-12 DIAGNOSIS — S82.409P: Primary | ICD-10-CM

## 2022-10-12 DIAGNOSIS — Z23 HIGH PRIORITY FOR 2019-NCOV VACCINE: ICD-10-CM

## 2022-10-12 DIAGNOSIS — Z87.81 S/P ORIF (OPEN REDUCTION INTERNAL FIXATION) FRACTURE: ICD-10-CM

## 2022-10-12 LAB — HGB BLD-MCNC: 13.1 G/DL (ref 11.7–15.7)

## 2022-10-12 PROCEDURE — 99204 OFFICE O/P NEW MOD 45 MIN: CPT | Mod: 25 | Performed by: PHYSICIAN ASSISTANT

## 2022-10-12 PROCEDURE — 84443 ASSAY THYROID STIM HORMONE: CPT | Performed by: PHYSICIAN ASSISTANT

## 2022-10-12 PROCEDURE — 85018 HEMOGLOBIN: CPT | Performed by: PHYSICIAN ASSISTANT

## 2022-10-12 PROCEDURE — 36415 COLL VENOUS BLD VENIPUNCTURE: CPT | Performed by: PHYSICIAN ASSISTANT

## 2022-10-12 PROCEDURE — 0124A COVID-19,PF,PFIZER BOOSTER BIVALENT: CPT | Performed by: PHYSICIAN ASSISTANT

## 2022-10-12 PROCEDURE — 91312 COVID-19,PF,PFIZER BOOSTER BIVALENT: CPT | Performed by: PHYSICIAN ASSISTANT

## 2022-10-12 PROCEDURE — 80048 BASIC METABOLIC PNL TOTAL CA: CPT | Performed by: PHYSICIAN ASSISTANT

## 2022-10-12 ASSESSMENT — PATIENT HEALTH QUESTIONNAIRE - PHQ9
SUM OF ALL RESPONSES TO PHQ QUESTIONS 1-9: 1
SUM OF ALL RESPONSES TO PHQ QUESTIONS 1-9: 1
10. IF YOU CHECKED OFF ANY PROBLEMS, HOW DIFFICULT HAVE THESE PROBLEMS MADE IT FOR YOU TO DO YOUR WORK, TAKE CARE OF THINGS AT HOME, OR GET ALONG WITH OTHER PEOPLE: NOT DIFFICULT AT ALL

## 2022-10-12 NOTE — PROGRESS NOTES
Hospital Follow-up Visit:    Hospital/Nursing Home/IP Rehab Facility: [facility]  Date of Admission: 9/29/2022  Date of Discharge: 9/30/2022  Reason(s) for Admission: Right distal tibia and fibular fracture    Was your hospitalization related to COVID-19? No   Problems taking medications regularly:  None  Medication changes since discharge: Yes, acetaminophen, aspirin, Hydromorphone, miralax  Problems adhering to non-medication therapy:  None      Pt tripped and fell over her dog on 9/29 sustaining R tib fib fx s/p ORIF  Found to be hyponatremic and slightly anemic in hosp  Will f/u with Dr. Gallardo  Patient was discharged with dilaudid and miralax  Had lovenox x one dose in hosp and now on full dose asa   She is out of dilaudid and having pain at night and did call ortho who send in Tramadol which does help  Drinks etoh socially a few times per week (beer)  Ortho is sending her to the bone clinic    Needs to est care    Past Medical History:   Diagnosis Date     Adjustment disorder with mixed anxiety and depressed mood      Leiomyoma of uterus, unspecified      Past Surgical History:   Procedure Laterality Date     HC LAPAROSCOPIC MYOMECTOMY, 1 - 4 INTRAMURAL MYOMAS =<250 GM  09/2006    laparoscopic, pedunculated     LAMINECT/DISCECTOMY, LUMBAR  11/15/11    L4-L5     OPEN REDUCTION INTERNAL FIXATION TIBIA Right 9/29/2022    Procedure: OPEN REDUCTION INTERNAL FIXATION RIGHT TIBIA.;  Surgeon: Raj Gallardo MD;  Location:  OR     [facility] APPENDECTOMY,RUPT APPENDX+ABSCESS  08/1989     Social History     Tobacco Use     Smoking status: Every Day     Packs/day: 0.50     Years: 18.00     Pack years: 9.00     Types: Cigarettes     Smokeless tobacco: Never   Substance Use Topics     Alcohol use: Yes     Comment: once or twice weekly     Current Outpatient Medications   Medication Sig Dispense Refill     acetaminophen (TYLENOL) 325 MG tablet Take 2 tablets (650 mg) by mouth every 6  "hours as needed for other (For optimal non-opioid multimodal pain management to improve pain control.) 45 tablet 0     aspirin (ASA) 325 MG EC tablet Take 1 tablet (325 mg) by mouth daily 30 tablet 0     COLLAGEN PO Take 1 tablet by mouth daily       diphenhydrAMINE (BENADRYL) 25 MG capsule Take 1 capsule (25 mg) by mouth every 6 hours as needed for itching (anxiety) 30 capsule 0     fish oil-omega-3 fatty acids 1000 MG capsule Take 1 g by mouth daily       hypromellose (GENTEAL) 0.3 % SOLN ophthalmic solution Place 1 drop into both eyes every hour as needed for dry eyes       multivitamin, therapeutic (THERA-VIT) TABS tablet Take 1 tablet by mouth daily       Vitamin D3 (CHOLECALCIFEROL) 125 MCG (5000 UT) tablet Take 125 mcg by mouth daily       Allergies   Allergen Reactions     Imitrex [Sumatriptan Succinate]      Sulfa Drugs Rash     FAMILY HISTORY NOTED AND REVIEWED    PHYSICAL EXAM:    /85 (BP Location: Left arm, Patient Position: Sitting, Cuff Size: Adult Regular)   Pulse 91   Temp 97.5  F (36.4  C)   Ht 1.6 m (5' 3\")   Wt 59 kg (130 lb)   SpO2 100%   BMI 23.03 kg/m      Patient appears non toxic  Pt using scooter as R foot in immobilizer    Results for orders placed or performed in visit on 10/12/22   Hemoglobin     Status: Normal   Result Value Ref Range    Hemoglobin 13.1 11.7 - 15.7 g/dL         Assessment and Plan:     (S82.209P,  S82.409P) Closed fracture of tibia and fibula with malunion, unspecified laterality, subsequent encounter  (primary encounter diagnosis)  Comment: ortho has set up pt to see the bone clinic. I'd recd bone density  Plan: pt taking some tramadol to help her sleep at night. Has f/u with Dr. Gallardo tomorrow.    (Z98.890,  Z87.81) S/P ORIF (open reduction internal fixation) fracture  Comment:   Plan:     (E87.1) Hyponatremia  Comment:   Plan: Basic metabolic panel  (Ca, Cl, CO2, Creat,         Gluc, K, Na, BUN)            (F17.210) Cigarette smoker  Comment:   Plan: pt " not interested in quitting at this time. Did discuss pros of cessation including improved healing    (D64.9) Anemia, unspecified type  Comment:   Plan: Hemoglobin normalized            (R79.89) Low TSH level  Comment:   Plan: TSH with free T4 reflex            (Z23) High priority for 2019-nCoV vaccine  Comment:   Plan: COVID-19,PF,PFIZER BOOSTER BIVALENT 12+Yrs              Lupe Mejias PA-C

## 2022-10-13 LAB
ANION GAP SERPL CALCULATED.3IONS-SCNC: 11 MMOL/L (ref 3–14)
BUN SERPL-MCNC: 11 MG/DL (ref 7–30)
CALCIUM SERPL-MCNC: 10.1 MG/DL (ref 8.5–10.1)
CHLORIDE BLD-SCNC: 100 MMOL/L (ref 94–109)
CO2 SERPL-SCNC: 19 MMOL/L (ref 20–32)
CREAT SERPL-MCNC: 0.54 MG/DL (ref 0.52–1.04)
GFR SERPL CREATININE-BSD FRML MDRD: >90 ML/MIN/1.73M2
GLUCOSE BLD-MCNC: 96 MG/DL (ref 70–99)
POTASSIUM BLD-SCNC: 3.6 MMOL/L (ref 3.4–5.3)
SODIUM SERPL-SCNC: 130 MMOL/L (ref 133–144)
TSH SERPL DL<=0.005 MIU/L-ACNC: 1.28 MU/L (ref 0.4–4)

## 2022-10-13 NOTE — RESULT ENCOUNTER NOTE
Leanne,    Your thyroid test normalized (TSH).  Your sodium is still a bit low at 130.  Liberalize the salt in your diet (chicken noodle soup etc) and avoid drinking a lot of water as that can further dilute your system. Also avoid drinking beer and have this rechecked again in a month.    Your calcium level normalized as did your hemoglobin.    Let me know if you have any questions.    Lupe Mejias PA-C

## 2022-12-01 ENCOUNTER — MEDICAL CORRESPONDENCE (OUTPATIENT)
Dept: HEALTH INFORMATION MANAGEMENT | Facility: CLINIC | Age: 57
End: 2022-12-01

## 2022-12-01 DIAGNOSIS — Z98.890 S/P ORIF (OPEN REDUCTION INTERNAL FIXATION) FRACTURE: ICD-10-CM

## 2022-12-01 DIAGNOSIS — Z87.81 S/P ORIF (OPEN REDUCTION INTERNAL FIXATION) FRACTURE: ICD-10-CM

## 2022-12-01 DIAGNOSIS — Z72.0 TOBACCO USER: ICD-10-CM

## 2022-12-01 DIAGNOSIS — E55.9 VITAMIN D INSUFFICIENCY: Primary | ICD-10-CM

## 2022-12-14 RX ORDER — NICOTINE POLACRILEX 2 MG
GUM BUCCAL
Status: ON HOLD | COMMUNITY
End: 2022-12-15

## 2022-12-14 RX ORDER — CEPHALEXIN 500 MG/1
500 CAPSULE ORAL 4 TIMES DAILY
Status: ON HOLD | COMMUNITY
Start: 2022-11-10 | End: 2022-12-19

## 2022-12-15 ENCOUNTER — ANESTHESIA EVENT (OUTPATIENT)
Dept: SURGERY | Facility: CLINIC | Age: 57
DRG: 857 | End: 2022-12-15
Payer: COMMERCIAL

## 2022-12-15 ENCOUNTER — HOSPITAL ENCOUNTER (INPATIENT)
Facility: CLINIC | Age: 57
LOS: 4 days | Discharge: HOME HEALTH | DRG: 857 | End: 2022-12-19
Attending: ORTHOPAEDIC SURGERY | Admitting: ORTHOPAEDIC SURGERY
Payer: COMMERCIAL

## 2022-12-15 ENCOUNTER — APPOINTMENT (OUTPATIENT)
Dept: GENERAL RADIOLOGY | Facility: CLINIC | Age: 57
DRG: 857 | End: 2022-12-15
Attending: ORTHOPAEDIC SURGERY
Payer: COMMERCIAL

## 2022-12-15 ENCOUNTER — ANESTHESIA (OUTPATIENT)
Dept: SURGERY | Facility: CLINIC | Age: 57
DRG: 857 | End: 2022-12-15
Payer: COMMERCIAL

## 2022-12-15 DIAGNOSIS — S91.001S OPEN WOUND OF RIGHT ANKLE, SEQUELA: ICD-10-CM

## 2022-12-15 DIAGNOSIS — S91.001A ANKLE WOUND, RIGHT, INITIAL ENCOUNTER: Primary | ICD-10-CM

## 2022-12-15 PROBLEM — S91.009A OPEN ANKLE WOUND: Status: ACTIVE | Noted: 2022-12-15

## 2022-12-15 LAB
GRAM STAIN RESULT: ABNORMAL

## 2022-12-15 PROCEDURE — 87077 CULTURE AEROBIC IDENTIFY: CPT | Performed by: ORTHOPAEDIC SURGERY

## 2022-12-15 PROCEDURE — 87205 SMEAR GRAM STAIN: CPT | Performed by: ORTHOPAEDIC SURGERY

## 2022-12-15 PROCEDURE — 258N000003 HC RX IP 258 OP 636: Performed by: ORTHOPAEDIC SURGERY

## 2022-12-15 PROCEDURE — 87070 CULTURE OTHR SPECIMN AEROBIC: CPT | Performed by: ORTHOPAEDIC SURGERY

## 2022-12-15 PROCEDURE — 272N000001 HC OR GENERAL SUPPLY STERILE: Performed by: ORTHOPAEDIC SURGERY

## 2022-12-15 PROCEDURE — 710N000009 HC RECOVERY PHASE 1, LEVEL 1, PER MIN: Performed by: ORTHOPAEDIC SURGERY

## 2022-12-15 PROCEDURE — 120N000001 HC R&B MED SURG/OB

## 2022-12-15 PROCEDURE — 250N000011 HC RX IP 250 OP 636: Performed by: NURSE ANESTHETIST, CERTIFIED REGISTERED

## 2022-12-15 PROCEDURE — 999N000065 XR ANKLE PORT RIGHT G/E 3 VIEWS: Mod: RT

## 2022-12-15 PROCEDURE — 250N000013 HC RX MED GY IP 250 OP 250 PS 637: Performed by: ORTHOPAEDIC SURGERY

## 2022-12-15 PROCEDURE — 360N000076 HC SURGERY LEVEL 3, PER MIN: Performed by: ORTHOPAEDIC SURGERY

## 2022-12-15 PROCEDURE — 99222 1ST HOSP IP/OBS MODERATE 55: CPT | Performed by: INTERNAL MEDICINE

## 2022-12-15 PROCEDURE — 258N000001 HC RX 258: Performed by: ORTHOPAEDIC SURGERY

## 2022-12-15 PROCEDURE — 258N000003 HC RX IP 258 OP 636: Performed by: NURSE ANESTHETIST, CERTIFIED REGISTERED

## 2022-12-15 PROCEDURE — 370N000017 HC ANESTHESIA TECHNICAL FEE, PER MIN: Performed by: ORTHOPAEDIC SURGERY

## 2022-12-15 PROCEDURE — 999N000141 HC STATISTIC PRE-PROCEDURE NURSING ASSESSMENT: Performed by: ORTHOPAEDIC SURGERY

## 2022-12-15 PROCEDURE — 250N000011 HC RX IP 250 OP 636: Performed by: ANESTHESIOLOGY

## 2022-12-15 PROCEDURE — 250N000013 HC RX MED GY IP 250 OP 250 PS 637: Performed by: PHYSICIAN ASSISTANT

## 2022-12-15 PROCEDURE — 0LBS0ZZ EXCISION OF RIGHT ANKLE TENDON, OPEN APPROACH: ICD-10-PCS | Performed by: ORTHOPAEDIC SURGERY

## 2022-12-15 PROCEDURE — 250N000011 HC RX IP 250 OP 636: Performed by: INTERNAL MEDICINE

## 2022-12-15 PROCEDURE — 250N000011 HC RX IP 250 OP 636: Performed by: PHYSICIAN ASSISTANT

## 2022-12-15 PROCEDURE — 250N000009 HC RX 250: Performed by: NURSE ANESTHETIST, CERTIFIED REGISTERED

## 2022-12-15 RX ORDER — ONDANSETRON 4 MG/1
4 TABLET, ORALLY DISINTEGRATING ORAL EVERY 30 MIN PRN
Status: DISCONTINUED | OUTPATIENT
Start: 2022-12-15 | End: 2022-12-15 | Stop reason: HOSPADM

## 2022-12-15 RX ORDER — FENTANYL CITRATE 50 UG/ML
25 INJECTION, SOLUTION INTRAMUSCULAR; INTRAVENOUS EVERY 5 MIN PRN
Status: DISCONTINUED | OUTPATIENT
Start: 2022-12-15 | End: 2022-12-15 | Stop reason: HOSPADM

## 2022-12-15 RX ORDER — PROPOFOL 10 MG/ML
INJECTION, EMULSION INTRAVENOUS CONTINUOUS PRN
Status: DISCONTINUED | OUTPATIENT
Start: 2022-12-15 | End: 2022-12-15

## 2022-12-15 RX ORDER — OXYCODONE HYDROCHLORIDE 5 MG/1
5 TABLET ORAL
Status: DISCONTINUED | OUTPATIENT
Start: 2022-12-15 | End: 2022-12-19 | Stop reason: HOSPADM

## 2022-12-15 RX ORDER — HYDROMORPHONE HCL IN WATER/PF 6 MG/30 ML
0.4 PATIENT CONTROLLED ANALGESIA SYRINGE INTRAVENOUS EVERY 5 MIN PRN
Status: DISCONTINUED | OUTPATIENT
Start: 2022-12-15 | End: 2022-12-15 | Stop reason: HOSPADM

## 2022-12-15 RX ORDER — PROCHLORPERAZINE MALEATE 5 MG
10 TABLET ORAL EVERY 6 HOURS PRN
Status: DISCONTINUED | OUTPATIENT
Start: 2022-12-15 | End: 2022-12-19 | Stop reason: HOSPADM

## 2022-12-15 RX ORDER — HYDROMORPHONE HCL IN WATER/PF 6 MG/30 ML
0.2 PATIENT CONTROLLED ANALGESIA SYRINGE INTRAVENOUS
Status: DISCONTINUED | OUTPATIENT
Start: 2022-12-15 | End: 2022-12-19 | Stop reason: HOSPADM

## 2022-12-15 RX ORDER — NALOXONE HYDROCHLORIDE 0.4 MG/ML
0.2 INJECTION, SOLUTION INTRAMUSCULAR; INTRAVENOUS; SUBCUTANEOUS
Status: DISCONTINUED | OUTPATIENT
Start: 2022-12-15 | End: 2022-12-19 | Stop reason: HOSPADM

## 2022-12-15 RX ORDER — FENTANYL CITRATE 50 UG/ML
50 INJECTION, SOLUTION INTRAMUSCULAR; INTRAVENOUS EVERY 5 MIN PRN
Status: DISCONTINUED | OUTPATIENT
Start: 2022-12-15 | End: 2022-12-15 | Stop reason: HOSPADM

## 2022-12-15 RX ORDER — ACETAMINOPHEN 325 MG/1
975 TABLET ORAL EVERY 8 HOURS
Status: DISPENSED | OUTPATIENT
Start: 2022-12-15 | End: 2022-12-18

## 2022-12-15 RX ORDER — OXYCODONE HYDROCHLORIDE 5 MG/1
5 TABLET ORAL EVERY 4 HOURS PRN
Status: DISCONTINUED | OUTPATIENT
Start: 2022-12-15 | End: 2022-12-15

## 2022-12-15 RX ORDER — DEXAMETHASONE SODIUM PHOSPHATE 4 MG/ML
INJECTION, SOLUTION INTRA-ARTICULAR; INTRALESIONAL; INTRAMUSCULAR; INTRAVENOUS; SOFT TISSUE PRN
Status: DISCONTINUED | OUTPATIENT
Start: 2022-12-15 | End: 2022-12-15

## 2022-12-15 RX ORDER — CEFAZOLIN SODIUM 2 G/100ML
2 INJECTION, SOLUTION INTRAVENOUS EVERY 8 HOURS
Status: DISCONTINUED | OUTPATIENT
Start: 2022-12-15 | End: 2022-12-16

## 2022-12-15 RX ORDER — OXYCODONE HYDROCHLORIDE 10 MG/1
10 TABLET ORAL EVERY 4 HOURS PRN
Status: DISCONTINUED | OUTPATIENT
Start: 2022-12-15 | End: 2022-12-15

## 2022-12-15 RX ORDER — OXYCODONE HYDROCHLORIDE 10 MG/1
10 TABLET ORAL EVERY 4 HOURS PRN
Status: DISCONTINUED | OUTPATIENT
Start: 2022-12-15 | End: 2022-12-19 | Stop reason: HOSPADM

## 2022-12-15 RX ORDER — SODIUM CHLORIDE, SODIUM LACTATE, POTASSIUM CHLORIDE, CALCIUM CHLORIDE 600; 310; 30; 20 MG/100ML; MG/100ML; MG/100ML; MG/100ML
INJECTION, SOLUTION INTRAVENOUS CONTINUOUS
Status: DISCONTINUED | OUTPATIENT
Start: 2022-12-15 | End: 2022-12-16

## 2022-12-15 RX ORDER — SODIUM CHLORIDE, SODIUM LACTATE, POTASSIUM CHLORIDE, CALCIUM CHLORIDE 600; 310; 30; 20 MG/100ML; MG/100ML; MG/100ML; MG/100ML
INJECTION, SOLUTION INTRAVENOUS CONTINUOUS
Status: DISCONTINUED | OUTPATIENT
Start: 2022-12-15 | End: 2022-12-15 | Stop reason: HOSPADM

## 2022-12-15 RX ORDER — KETOROLAC TROMETHAMINE 30 MG/ML
INJECTION, SOLUTION INTRAMUSCULAR; INTRAVENOUS PRN
Status: DISCONTINUED | OUTPATIENT
Start: 2022-12-15 | End: 2022-12-15

## 2022-12-15 RX ORDER — CEFAZOLIN SODIUM/WATER 2 G/20 ML
2 SYRINGE (ML) INTRAVENOUS SEE ADMIN INSTRUCTIONS
Status: DISCONTINUED | OUTPATIENT
Start: 2022-12-15 | End: 2022-12-15 | Stop reason: HOSPADM

## 2022-12-15 RX ORDER — ONDANSETRON 4 MG/1
4 TABLET, ORALLY DISINTEGRATING ORAL EVERY 6 HOURS PRN
Status: DISCONTINUED | OUTPATIENT
Start: 2022-12-15 | End: 2022-12-19 | Stop reason: HOSPADM

## 2022-12-15 RX ORDER — LIDOCAINE HYDROCHLORIDE 10 MG/ML
INJECTION, SOLUTION INFILTRATION; PERINEURAL PRN
Status: DISCONTINUED | OUTPATIENT
Start: 2022-12-15 | End: 2022-12-15

## 2022-12-15 RX ORDER — POLYETHYLENE GLYCOL 3350 17 G/17G
17 POWDER, FOR SOLUTION ORAL DAILY
Status: DISCONTINUED | OUTPATIENT
Start: 2022-12-16 | End: 2022-12-19 | Stop reason: HOSPADM

## 2022-12-15 RX ORDER — ONDANSETRON 2 MG/ML
4 INJECTION INTRAMUSCULAR; INTRAVENOUS EVERY 30 MIN PRN
Status: DISCONTINUED | OUTPATIENT
Start: 2022-12-15 | End: 2022-12-15 | Stop reason: HOSPADM

## 2022-12-15 RX ORDER — ONDANSETRON 2 MG/ML
INJECTION INTRAMUSCULAR; INTRAVENOUS PRN
Status: DISCONTINUED | OUTPATIENT
Start: 2022-12-15 | End: 2022-12-15

## 2022-12-15 RX ORDER — LIDOCAINE 40 MG/G
CREAM TOPICAL
Status: DISCONTINUED | OUTPATIENT
Start: 2022-12-15 | End: 2022-12-19 | Stop reason: HOSPADM

## 2022-12-15 RX ORDER — ACETAMINOPHEN 325 MG/1
975 TABLET ORAL ONCE
Status: COMPLETED | OUTPATIENT
Start: 2022-12-15 | End: 2022-12-15

## 2022-12-15 RX ORDER — LIDOCAINE 40 MG/G
CREAM TOPICAL
Status: DISCONTINUED | OUTPATIENT
Start: 2022-12-15 | End: 2022-12-15 | Stop reason: HOSPADM

## 2022-12-15 RX ORDER — FENTANYL CITRATE 50 UG/ML
INJECTION, SOLUTION INTRAMUSCULAR; INTRAVENOUS PRN
Status: DISCONTINUED | OUTPATIENT
Start: 2022-12-15 | End: 2022-12-15

## 2022-12-15 RX ORDER — NALOXONE HYDROCHLORIDE 0.4 MG/ML
0.4 INJECTION, SOLUTION INTRAMUSCULAR; INTRAVENOUS; SUBCUTANEOUS
Status: DISCONTINUED | OUTPATIENT
Start: 2022-12-15 | End: 2022-12-19 | Stop reason: HOSPADM

## 2022-12-15 RX ORDER — PROPOFOL 10 MG/ML
INJECTION, EMULSION INTRAVENOUS PRN
Status: DISCONTINUED | OUTPATIENT
Start: 2022-12-15 | End: 2022-12-15

## 2022-12-15 RX ORDER — MULTIVITAMIN,THERAPEUTIC
1 TABLET ORAL DAILY
Status: DISCONTINUED | OUTPATIENT
Start: 2022-12-15 | End: 2022-12-19 | Stop reason: HOSPADM

## 2022-12-15 RX ORDER — BISACODYL 10 MG
10 SUPPOSITORY, RECTAL RECTAL DAILY PRN
Status: DISCONTINUED | OUTPATIENT
Start: 2022-12-15 | End: 2022-12-19 | Stop reason: HOSPADM

## 2022-12-15 RX ORDER — HYDROCODONE BITARTRATE AND ACETAMINOPHEN 5; 325 MG/1; MG/1
1-2 TABLET ORAL EVERY 4 HOURS PRN
Status: DISCONTINUED | OUTPATIENT
Start: 2022-12-15 | End: 2022-12-19 | Stop reason: HOSPADM

## 2022-12-15 RX ORDER — HYDROMORPHONE HCL IN WATER/PF 6 MG/30 ML
0.4 PATIENT CONTROLLED ANALGESIA SYRINGE INTRAVENOUS
Status: DISCONTINUED | OUTPATIENT
Start: 2022-12-15 | End: 2022-12-19 | Stop reason: HOSPADM

## 2022-12-15 RX ORDER — GLYCOPYRROLATE 0.2 MG/ML
INJECTION, SOLUTION INTRAMUSCULAR; INTRAVENOUS PRN
Status: DISCONTINUED | OUTPATIENT
Start: 2022-12-15 | End: 2022-12-15

## 2022-12-15 RX ORDER — FENTANYL CITRATE 50 UG/ML
25 INJECTION, SOLUTION INTRAMUSCULAR; INTRAVENOUS
Status: DISCONTINUED | OUTPATIENT
Start: 2022-12-15 | End: 2022-12-15 | Stop reason: HOSPADM

## 2022-12-15 RX ORDER — ACETAMINOPHEN 325 MG/1
650 TABLET ORAL EVERY 4 HOURS PRN
Status: DISCONTINUED | OUTPATIENT
Start: 2022-12-18 | End: 2022-12-19 | Stop reason: HOSPADM

## 2022-12-15 RX ORDER — CEFAZOLIN SODIUM 1 G/3ML
1 INJECTION, POWDER, FOR SOLUTION INTRAMUSCULAR; INTRAVENOUS EVERY 8 HOURS
Status: DISCONTINUED | OUTPATIENT
Start: 2022-12-15 | End: 2022-12-15

## 2022-12-15 RX ORDER — AMOXICILLIN 250 MG
1 CAPSULE ORAL 2 TIMES DAILY
Status: DISCONTINUED | OUTPATIENT
Start: 2022-12-15 | End: 2022-12-19 | Stop reason: HOSPADM

## 2022-12-15 RX ORDER — CEFAZOLIN SODIUM/WATER 2 G/20 ML
2 SYRINGE (ML) INTRAVENOUS
Status: COMPLETED | OUTPATIENT
Start: 2022-12-15 | End: 2022-12-15

## 2022-12-15 RX ORDER — PHENYLEPHRINE HYDROCHLORIDE 10 MG/ML
INJECTION INTRAVENOUS PRN
Status: DISCONTINUED | OUTPATIENT
Start: 2022-12-15 | End: 2022-12-15

## 2022-12-15 RX ORDER — SODIUM CHLORIDE, SODIUM LACTATE, POTASSIUM CHLORIDE, CALCIUM CHLORIDE 600; 310; 30; 20 MG/100ML; MG/100ML; MG/100ML; MG/100ML
INJECTION, SOLUTION INTRAVENOUS CONTINUOUS PRN
Status: DISCONTINUED | OUTPATIENT
Start: 2022-12-15 | End: 2022-12-15

## 2022-12-15 RX ORDER — HYDROCODONE BITARTRATE AND ACETAMINOPHEN 5; 325 MG/1; MG/1
1 TABLET ORAL PRN
Status: ON HOLD | COMMUNITY
Start: 2022-12-14 | End: 2022-12-16

## 2022-12-15 RX ORDER — MEPERIDINE HYDROCHLORIDE 25 MG/ML
12.5 INJECTION INTRAMUSCULAR; INTRAVENOUS; SUBCUTANEOUS
Status: DISCONTINUED | OUTPATIENT
Start: 2022-12-15 | End: 2022-12-15 | Stop reason: HOSPADM

## 2022-12-15 RX ORDER — HYDROXYZINE HYDROCHLORIDE 25 MG/1
25 TABLET, FILM COATED ORAL EVERY 6 HOURS PRN
Status: DISCONTINUED | OUTPATIENT
Start: 2022-12-15 | End: 2022-12-19 | Stop reason: HOSPADM

## 2022-12-15 RX ORDER — FAMOTIDINE 20 MG/1
20 TABLET, FILM COATED ORAL 2 TIMES DAILY
Status: DISCONTINUED | OUTPATIENT
Start: 2022-12-15 | End: 2022-12-19 | Stop reason: HOSPADM

## 2022-12-15 RX ORDER — VITAMIN B COMPLEX
125 TABLET ORAL DAILY
Status: DISCONTINUED | OUTPATIENT
Start: 2022-12-15 | End: 2022-12-19 | Stop reason: HOSPADM

## 2022-12-15 RX ORDER — DIPHENHYDRAMINE HCL 25 MG
25 CAPSULE ORAL EVERY 6 HOURS PRN
Status: DISCONTINUED | OUTPATIENT
Start: 2022-12-15 | End: 2022-12-19 | Stop reason: HOSPADM

## 2022-12-15 RX ORDER — HYDROMORPHONE HCL IN WATER/PF 6 MG/30 ML
0.2 PATIENT CONTROLLED ANALGESIA SYRINGE INTRAVENOUS EVERY 5 MIN PRN
Status: DISCONTINUED | OUTPATIENT
Start: 2022-12-15 | End: 2022-12-15 | Stop reason: HOSPADM

## 2022-12-15 RX ORDER — ONDANSETRON 2 MG/ML
4 INJECTION INTRAMUSCULAR; INTRAVENOUS EVERY 6 HOURS PRN
Status: DISCONTINUED | OUTPATIENT
Start: 2022-12-15 | End: 2022-12-19 | Stop reason: HOSPADM

## 2022-12-15 RX ADMIN — PROPOFOL 50 MCG/KG/MIN: 10 INJECTION, EMULSION INTRAVENOUS at 12:23

## 2022-12-15 RX ADMIN — HYDROMORPHONE HYDROCHLORIDE 0.4 MG: 0.2 INJECTION, SOLUTION INTRAMUSCULAR; INTRAVENOUS; SUBCUTANEOUS at 13:29

## 2022-12-15 RX ADMIN — HYDROCODONE BITARTRATE AND ACETAMINOPHEN 2 TABLET: 5; 325 TABLET ORAL at 20:07

## 2022-12-15 RX ADMIN — LIDOCAINE HYDROCHLORIDE 30 MG: 10 INJECTION, SOLUTION INFILTRATION; PERINEURAL at 12:14

## 2022-12-15 RX ADMIN — HYDROCODONE BITARTRATE AND ACETAMINOPHEN 2 TABLET: 5; 325 TABLET ORAL at 15:47

## 2022-12-15 RX ADMIN — THERA TABS 1 TABLET: TAB at 16:39

## 2022-12-15 RX ADMIN — SODIUM CHLORIDE, POTASSIUM CHLORIDE, SODIUM LACTATE AND CALCIUM CHLORIDE: 600; 310; 30; 20 INJECTION, SOLUTION INTRAVENOUS at 18:57

## 2022-12-15 RX ADMIN — FENTANYL CITRATE 50 MCG: 50 INJECTION, SOLUTION INTRAMUSCULAR; INTRAVENOUS at 13:20

## 2022-12-15 RX ADMIN — DEXAMETHASONE SODIUM PHOSPHATE 4 MG: 4 INJECTION, SOLUTION INTRA-ARTICULAR; INTRALESIONAL; INTRAMUSCULAR; INTRAVENOUS; SOFT TISSUE at 12:14

## 2022-12-15 RX ADMIN — HYDROMORPHONE HYDROCHLORIDE 0.5 MG: 1 INJECTION, SOLUTION INTRAMUSCULAR; INTRAVENOUS; SUBCUTANEOUS at 12:48

## 2022-12-15 RX ADMIN — MIDAZOLAM 2 MG: 1 INJECTION INTRAMUSCULAR; INTRAVENOUS at 12:05

## 2022-12-15 RX ADMIN — ASPIRIN 325 MG: 325 TABLET, COATED ORAL at 17:57

## 2022-12-15 RX ADMIN — KETOROLAC TROMETHAMINE 30 MG: 30 INJECTION, SOLUTION INTRAMUSCULAR at 12:26

## 2022-12-15 RX ADMIN — PHENYLEPHRINE HYDROCHLORIDE 50 MCG: 10 INJECTION INTRAVENOUS at 12:22

## 2022-12-15 RX ADMIN — PROPOFOL 200 MG: 10 INJECTION, EMULSION INTRAVENOUS at 12:14

## 2022-12-15 RX ADMIN — SODIUM CHLORIDE, POTASSIUM CHLORIDE, SODIUM LACTATE AND CALCIUM CHLORIDE: 600; 310; 30; 20 INJECTION, SOLUTION INTRAVENOUS at 12:45

## 2022-12-15 RX ADMIN — ACETAMINOPHEN 975 MG: 325 TABLET, FILM COATED ORAL at 11:10

## 2022-12-15 RX ADMIN — CEFAZOLIN SODIUM 2 G: 2 INJECTION, SOLUTION INTRAVENOUS at 20:07

## 2022-12-15 RX ADMIN — SODIUM CHLORIDE, POTASSIUM CHLORIDE, SODIUM LACTATE AND CALCIUM CHLORIDE: 600; 310; 30; 20 INJECTION, SOLUTION INTRAVENOUS at 11:26

## 2022-12-15 RX ADMIN — FENTANYL CITRATE 50 MCG: 50 INJECTION, SOLUTION INTRAMUSCULAR; INTRAVENOUS at 12:12

## 2022-12-15 RX ADMIN — FAMOTIDINE 20 MG: 20 TABLET ORAL at 20:02

## 2022-12-15 RX ADMIN — FENTANYL CITRATE 50 MCG: 50 INJECTION, SOLUTION INTRAMUSCULAR; INTRAVENOUS at 12:08

## 2022-12-15 RX ADMIN — SODIUM CHLORIDE, POTASSIUM CHLORIDE, SODIUM LACTATE AND CALCIUM CHLORIDE: 600; 310; 30; 20 INJECTION, SOLUTION INTRAVENOUS at 16:42

## 2022-12-15 RX ADMIN — Medication 125 MCG: at 16:39

## 2022-12-15 RX ADMIN — ONDANSETRON HYDROCHLORIDE 4 MG: 2 INJECTION, SOLUTION INTRAVENOUS at 12:24

## 2022-12-15 RX ADMIN — SENNOSIDES AND DOCUSATE SODIUM 1 TABLET: 50; 8.6 TABLET ORAL at 20:02

## 2022-12-15 RX ADMIN — HYDROXYZINE HYDROCHLORIDE 25 MG: 25 TABLET, FILM COATED ORAL at 17:57

## 2022-12-15 RX ADMIN — PHENYLEPHRINE HYDROCHLORIDE 50 MCG: 10 INJECTION INTRAVENOUS at 12:24

## 2022-12-15 RX ADMIN — HYDROMORPHONE HYDROCHLORIDE 0.5 MG: 1 INJECTION, SOLUTION INTRAMUSCULAR; INTRAVENOUS; SUBCUTANEOUS at 12:23

## 2022-12-15 RX ADMIN — Medication 2 G: at 12:06

## 2022-12-15 RX ADMIN — FENTANYL CITRATE 50 MCG: 50 INJECTION, SOLUTION INTRAMUSCULAR; INTRAVENOUS at 13:11

## 2022-12-15 RX ADMIN — GLYCOPYRROLATE 0.2 MG: 0.2 INJECTION, SOLUTION INTRAMUSCULAR; INTRAVENOUS at 12:14

## 2022-12-15 ASSESSMENT — ACTIVITIES OF DAILY LIVING (ADL)
ADLS_ACUITY_SCORE: 24
ADLS_ACUITY_SCORE: 22
ADLS_ACUITY_SCORE: 24
ADLS_ACUITY_SCORE: 24

## 2022-12-15 ASSESSMENT — LIFESTYLE VARIABLES: TOBACCO_USE: 1

## 2022-12-15 NOTE — ANESTHESIA PREPROCEDURE EVALUATION
Anesthesia Pre-Procedure Evaluation    Patient: Leanne Valentino   MRN: [id number] : 1965        Procedure : Procedure(s):  IRRIGATION AND DEBRIDEMENT and wound vac placement of right ankle          Past Medical History:   Diagnosis Date     Adjustment disorder with mixed anxiety and depressed mood      Leiomyoma of uterus, unspecified       Past Surgical History:   Procedure Laterality Date     HC LAPAROSCOPIC MYOMECTOMY, 1 - 4 INTRAMURAL MYOMAS =<250 GM  2006    laparoscopic, pedunculated     LAMINECT/DISCECTOMY, LUMBAR  11/15/11    L4-L5     OPEN REDUCTION INTERNAL FIXATION TIBIA Right 2022    Procedure: OPEN REDUCTION INTERNAL FIXATION RIGHT TIBIA.;  Surgeon: Raj Gallardo MD;  Location:  OR     [facility] APPENDECTOMY,RUPT APPENDX+ABSCESS  1989      Allergies   Allergen Reactions     Sumatriptan Other (See Comments)     Other reaction(s): Tachycardia  tachycardia       Sulfa Drugs Rash      Social History     Tobacco Use     Smoking status: Every Day     Packs/day: 0.50     Years: 18.00     Pack years: 9.00     Types: Cigarettes     Smokeless tobacco: Never   Substance Use Topics     Alcohol use: Yes     Comment: once or twice weekly      Wt Readings from Last 1 Encounters:   12/15/22 58.8 kg (129 lb 11.2 oz)        Anesthesia Evaluation            ROS/MED HX  ENT/Pulmonary:     (+) tobacco use,     Neurologic:  - neg neurologic ROS     Cardiovascular:  - neg cardiovascular ROS     METS/Exercise Tolerance:     Hematologic:  - neg hematologic  ROS     Musculoskeletal:  - neg musculoskeletal ROS     GI/Hepatic:  - neg GI/hepatic ROS     Renal/Genitourinary:  - neg Renal ROS     Endo:  - neg endo ROS     Psychiatric/Substance Use:  - neg psychiatric ROS     Infectious Disease:  - neg infectious disease ROS     Malignancy:  - neg malignancy ROS     Other:  - neg other ROS          Physical Exam    Airway        Mallampati: II    Neck ROM: full     Respiratory Devices and Support          Dental           Cardiovascular   cardiovascular exam normal       Rhythm and rate: regular     Pulmonary   pulmonary exam normal                OUTSIDE LABS:  CBC:   Lab Results   Component Value Date    WBC 5.9 09/30/2022    WBC 7.3 09/29/2022    HGB 13.1 10/12/2022    HGB 10.1 (L) 09/30/2022    HGB 10.1 (L) 09/30/2022    HCT 30.9 (L) 09/30/2022    HCT 33.8 (L) 09/29/2022     09/30/2022     09/29/2022     BMP:   Lab Results   Component Value Date     (L) 10/12/2022     (L) 09/30/2022    POTASSIUM 3.6 10/12/2022    POTASSIUM 3.9 09/30/2022    CHLORIDE 100 10/12/2022    CHLORIDE 98 09/30/2022    CO2 19 (L) 10/12/2022    CO2 22 09/30/2022    BUN 11 10/12/2022    BUN 7 09/30/2022    CR 0.54 10/12/2022    CR 0.52 09/30/2022    CR 0.52 09/30/2022    GLC 96 10/12/2022    GLC 84 09/30/2022     COAGS: No results found for: PTT, INR, FIBR  POC:   Lab Results   Component Value Date    HCG Negative 08/03/2010     HEPATIC: No results found for: ALBUMIN, PROTTOTAL, ALT, AST, GGT, ALKPHOS, BILITOTAL, BILIDIRECT, SEUN  OTHER:   Lab Results   Component Value Date    ALICE 10.1 10/12/2022    TSH 1.28 10/12/2022    T4 1.11 09/30/2022       Anesthesia Plan    ASA Status:  2      Anesthesia Type: General.     - Airway: LMA   Induction: Intravenous, Propofol.   Maintenance: Balanced.        Consents    Anesthesia Plan(s) and associated risks, benefits, and realistic alternatives discussed. Questions answered and patient/representative(s) expressed understanding.    - Discussed:     - Discussed with:  Patient         Postoperative Care    Pain management: IV analgesics, Oral pain medications, Multi-modal analgesia.   PONV prophylaxis: Ondansetron (or other 5HT-3), Dexamethasone or Solumedrol     Comments:                Pedro Murillo DO

## 2022-12-15 NOTE — OP NOTE
Procedure Date: 12/15/2022    PREOPERATIVE DIAGNOSIS:  Right distal tibia surgical wound dehiscence.    POSTOPERATIVE DIAGNOSIS:  Right distal tibia surgical wound dehiscence.    PROCEDURE:  Open irrigation and debridement of right distal tibia surgical wound with placement of vacuum-assisted wound closure device.    SURGEON:  Raj Gallardo MD.    FIRST ASSISTANT:  Cyndi Helms PA-C.    INDICATIONS FOR PROCEDURE:  Ms. Valentino is a very pleasant 57-year-old female who 2 months ago had a right distal tibia fracture that was treated with open reduction and internal fixation.  At that time, she did well, fracture was healing nicely, but she had some necrosis around her distal tibial wound, which we were treating with local wound care, as she continued to progress her healing and advancing her physical therapy.  Unfortunately, over the last week, she has had increased redness, drainage and wound breakdown.  We elected to proceed to the operating room for an open irrigation and debridement with obtaining cultures and then placement of a vacuum-assisted wound closure device.  She understands the risks, benefits and alternatives and wished to proceed with surgery.    NARRATIVE EVENTS:  After thorough preoperative evaluation and proper identification of the patient to be operated on, Mr. Valentino was taken to the operating room where she underwent a general anesthetic, placed supine on the operating table and her right leg was prepped and draped in the usual sterile manner.  After appropriate surgical pause confirming the patient's extremity to be operated on, 10 minutes after received 2 grams of Ancef, right leg was approached by extending her previous incision.  Skin and soft tissue sharply dissected down, at that point down to the muscle fascia, especially over the anterior compartment.  Within the wound, the tendon of her extensor hallucis longus and her tibialis anterior were both visible at the distal end of the  wound.  We debrided these wounds sharply using a rongeur.  We measured the wound, which measured 8 cm from superior to inferior, and 5 cm from medial to lateral, and to a depth of 0.5 cm.  We debrided this sharply with a curette, rongeur.  Three cultures were obtained.  We thoroughly irrigated with 3 liters of saline.  We closed the area of the wound that was opened with 2-0 nylon sutures.  We did place a retention suture to try to obtain some element of wound closure over the tibialis anterior tendon.  We were not able to advance the tissue to any great degree.  So at this point, we then placed a vacuum-assisted wound closure device over this wound.  We set the VAC settings at 125 mmHg negative pressure.  The patient was placed in a well-padded postop dressing, taken to recovery room in stable condition.  She tolerated the procedure without difficulty.    Raj Gallardo MD        D: 12/15/2022   T: 12/15/2022   MT: [id number]    Name:     JUAN MANUEL PERDUE  MRN:      [id number]        Account:        [id number]   :      1965           Procedure Date: 12/15/2022     Document: [id number]

## 2022-12-15 NOTE — ANESTHESIA POSTPROCEDURE EVALUATION
Patient: Leanne Valentino    Procedure: Procedure(s):  IRRIGATION AND DEBRIDEMENT and wound vac placement of right ankle       Anesthesia Type:  General    Note:     Postop Pain Control: Uneventful            Sign Out: Well controlled pain   PONV: No   Neuro/Psych: Uneventful            Sign Out: Acceptable/Baseline neuro status   Airway/Respiratory:             Sign Out: Acceptable/Baseline resp. status   CV/Hemodynamics:             Sign Out: Acceptable CV status   Other NRE:    DID A NON-ROUTINE EVENT OCCUR?            Last vitals:  Vitals Value Taken Time   /92 12/15/22 1320   Temp 98  F (36.7  C) 12/15/22 1305   Pulse 67 12/15/22 1322   Resp 8 12/15/22 1322   SpO2 100 % 12/15/22 1322   Vitals shown include unvalidated device data.    Electronically Signed By: Pedro Murillo DO  December 15, 2022  1:23 PM

## 2022-12-15 NOTE — LETTER
Gaby Bills RN Case Manager  Inpatient Care Coordination  [facility]   [phone]    New Referral  RN for Wound Vac

## 2022-12-15 NOTE — BRIEF OP NOTE
[facility]    Brief Operative Note    Pre-operative diagnosis: Wound dehiscence [T81.30XA]  Post-operative diagnosis Same as pre-operative diagnosis    Procedure: Procedure(s):  IRRIGATION AND DEBRIDEMENT and wound vac placement of right ankle  Surgeon: Surgeon(s) and Role:     * Raj Gallardo MD - Primary     * Cyndi Helms PA-C - Assisting  Anesthesia: General   Estimated Blood Loss: Minimal    Drains: None  Specimens:   ID Type Source Tests Collected by Time Destination   A : RIGHT ANKLE TISSUE Tissue Ankle, Right ANAEROBIC BACTERIAL CULTURE ROUTINE, GRAM STAIN, AEROBIC BACTERIAL CULTURE ROUTINE Raj Galladro MD 12/15/2022 12:33 PM    B : RIGHT ANKLE TISSUE #2 Tissue Ankle, Right ANAEROBIC BACTERIAL CULTURE ROUTINE, GRAM STAIN, AEROBIC BACTERIAL CULTURE ROUTINE Raj Gallardo MD 12/15/2022 12:36 PM    C : RIGHT ANKLE TISSUE #3 Tissue Ankle, Right ANAEROBIC BACTERIAL CULTURE ROUTINE, GRAM STAIN, AEROBIC BACTERIAL CULTURE ROUTINE Raj Gallardo MD 12/15/2022 12:39 PM      Findings:   None.  Complications: None.  Implants: * No implants in log *

## 2022-12-15 NOTE — CONSULTS
[facility]    Infectious Disease Consultation     Date of Admission:  12/15/2022  Date of Consult (When I saw the patient): 12/15/22    Assessment & Plan   Leanne Valentino is a 57 year old female who was admitted on 12/15/2022.     Impression:  1. 57 y.o female with history of right distal tibial fracture which was treated with ORIF 2 months ago.  Had some skin necrosis issue and was on oral keflex.   2. Last week she had increase in the redness and drainage and skin breakdown.   3. Admitted now for  open irrigation and debridement with obtaining cultures and then placement of a vacuum-assisted wound closure device.    4. Hard ware left in place   5. Cultures from the OR are pending.   6. On ancef.     Recommendations:   Ancef for now   Follow up on the OR cultures   Anticipate need for long term IV antibiotics     Roxy Leonardo MD    Reason for Consult   Reason for consult: I was asked to evaluate this patient for ankle infection .    Primary Care Physician   Physician No Ref-Primary    Chief Complaint   Ankle redness drainage     History is obtained from the patient and medical records    History of Present Illness   Leanne Valentino is a 57 year old female who had a right distal tibial fracture which was treated with ORIF 2 months ago.   had some necrosis which was being managed by wound care. But over last week she had increas ein the redness and drainage and skin breakdown.   Admitted now for  open irrigation and debridement with obtaining cultures and then placement of a vacuum-assisted wound closure device.    Past Medical History   I have reviewed this patient's medical history and updated it with pertinent information if needed.   Past Medical History:   Diagnosis Date     Adjustment disorder with mixed anxiety and depressed mood      Leiomyoma of uterus, unspecified        Past Surgical History   I have reviewed this patient's surgical history and updated it with pertinent  information if needed.  Past Surgical History:   Procedure Laterality Date     HC LAPAROSCOPIC MYOMECTOMY, 1 - 4 INTRAMURAL MYOMAS =<250 GM  09/2006    laparoscopic, pedunculated     LAMINECT/DISCECTOMY, LUMBAR  11/15/11    L4-L5     OPEN REDUCTION INTERNAL FIXATION TIBIA Right 9/29/2022    Procedure: OPEN REDUCTION INTERNAL FIXATION RIGHT TIBIA.;  Surgeon: Raj Gallardo MD;  Location:  OR     [facility] APPENDECTOMY,RUPT APPENDX+ABSCESS  08/1989       Prior to Admission Medications   Prior to Admission Medications   Prescriptions Last Dose Informant Patient Reported? Taking?   COLLAGEN PO 12/14/2022 at 0800 Self Yes Yes   Sig: Take 1 tablet by mouth daily   HYDROcodone-acetaminophen (NORCO) 5-325 MG tablet 12/15/2022 at 0500  Yes Yes   Sig: Take 1 tablet by mouth as needed   Vitamin D3 (CHOLECALCIFEROL) 125 MCG (5000 UT) tablet 12/14/2022 at 0600 Self Yes Yes   Sig: Take 125 mcg by mouth daily   acetaminophen (TYLENOL) 325 MG tablet Past Month  No Yes   Sig: Take 2 tablets (650 mg) by mouth every 6 hours as needed for other (For optimal non-opioid multimodal pain management to improve pain control.)   cephALEXin (KEFLEX) 500 MG capsule 12/14/2022 at 1700  Yes Yes   Sig: Take 500 mg by mouth 4 times daily   fish oil-omega-3 fatty acids 1000 MG capsule 12/14/2022 at 0600 Self Yes Yes   Sig: Take 1 g by mouth daily   hypromellose (GENTEAL) 0.3 % SOLN ophthalmic solution 12/15/2022 at 0700 Self Yes Yes   Sig: Place 1 drop into both eyes every hour as needed for dry eyes   multivitamin, therapeutic (THERA-VIT) TABS tablet 12/14/2022 at 0600 Self Yes Yes   Sig: Take 1 tablet by mouth daily      Facility-Administered Medications: None     Allergies   Allergies   Allergen Reactions     Sumatriptan Other (See Comments)     Other reaction(s): Tachycardia  tachycardia       Sulfa Drugs Rash       Immunization History   Immunization History   Administered Date(s) Administered     COVID-19 Vaccine Bivalent Booster 12+  (Pfizer) 10/12/2022     Influenza Vaccine, 6+MO IM (QUADRIVALENT W/PRESERVATIVES) 10/26/2018     TDAP Vaccine (Boostrix) 07/13/2010       Social History   I have reviewed this patient's social history and updated it with pertinent information if needed. Leanne Valentino  reports that she has been smoking cigarettes. She has a 9.00 pack-year smoking history. She has never used smokeless tobacco. She reports current alcohol use. She reports that she does not use drugs.    Family History   I have reviewed this patient's family history and updated it with pertinent information if needed.   Family History   Problem Relation Age of Onset     Hypertension Mother      Arthritis Mother      Depression Mother      Hypothyroidism Mother      Hypertension Father      Eye Disorder Father         cataracts     Lipids Father      Heart Disease Father         heart failure     Cerebrovascular Disease Paternal Grandmother        Review of Systems   The 10 point Review of Systems is negative other than listed in the HPI     Physical Exam   Temp: 97.8  F (36.6  C) Temp src: Temporal BP: 128/68 Pulse: 96   Resp: 18 SpO2: 100 % O2 Device: None (Room air) Oxygen Delivery: 3 LPM  Vital Signs with Ranges  Temp:  [97.5  F (36.4  C)-98  F (36.7  C)] 97.8  F (36.6  C)  Pulse:  [] 96  Resp:  [15-18] 18  BP: (117-139)/(68-87) 128/68  SpO2:  [99 %-100 %] 100 %  129 lbs 11.2 oz  Body mass index is 22.98 kg/m .    GENERAL APPEARANCE:  awake  EYES: Eyes grossly normal to inspection  NECK: no adenopathy  RESP: lungs clear   CV: regular rates and rhythm  LYMPHATICS: normal ant/post cervical and supraclavicular nodes  ABDOMEN: soft, nontender  MS: dressing on   SKIN: no suspicious lesions or rashes        Data   Lab Results   Component Value Date    WBC 5.9 09/30/2022    HGB 13.1 10/12/2022    HCT 30.9 (L) 09/30/2022     09/30/2022     (L) 10/12/2022    POTASSIUM 3.6 10/12/2022    CHLORIDE 100 10/12/2022    CO2 19 (L) 10/12/2022     BUN 11 10/12/2022    CR 0.54 10/12/2022    GLC 96 10/12/2022     No results for input(s): CULT in the last 168 hours.  No lab results found.    Invalid input(s): UC       All cultures:  No results for input(s): CULTURE in the last 168 hours.   Blood culture:  No results found for this or any previous visit.   Urine culture:  No results found for this or any previous visit.

## 2022-12-15 NOTE — PLAN OF CARE
Goal Outcome Evaluation:      Plan of Care Reviewed With: patient    Overall Patient Progress: improving     Immediate post op. Dressing clean and dry. Wound vac to 125 cont. Suction. No numbness. VSS. Norco provided. Oriented to room. Ate a meal tray. Pleasant.     Patient vital signs are at baseline: Yes. Capnography on. Room air  Patient able to ambulate as they were prior to admission or with assist devices provided by therapies during their stay:  No,  Reason:  not out of bed yet. WBAT.   Patient MUST void prior to discharge:  No,  Reason:  due to void  Patient able to tolerate oral intake:  Yes  Pain has adequate pain control using Oral analgesics:  No. Pain elevated now. Norco provided. Will monitor.   Does patient have an identified :  Yes  Has goal D/C date and time been discussed with patient:  No,  Reason:  immediate post op. Awaiting cultures. ID consulted. Ancef. Patient really wanting to go home at discharge.

## 2022-12-15 NOTE — ANESTHESIA CARE TRANSFER NOTE
Patient: Leanne Valentino    Procedure: Procedure(s):  IRRIGATION AND DEBRIDEMENT and wound vac placement of right ankle       Diagnosis: Wound dehiscence [T81.30XA]  Diagnosis Additional Information: No value filed.    Anesthesia Type:   General     Note:    Oropharynx: oropharynx clear of all foreign objects  Level of Consciousness: awake  Oxygen Supplementation: face mask  Level of Supplemental Oxygen (L/min / FiO2): 6  Independent Airway: airway patency satisfactory and stable  Dentition: dentition unchanged  Vital Signs Stable: post-procedure vital signs reviewed and stable  Report to RN Given: handoff report given  Patient transferred to: PACU    Handoff Report: Identifed the Patient, Identified the Reponsible Provider, Reviewed the pertinent medical history, Discussed the surgical course, Reviewed Intra-OP anesthesia mangement and issues during anesthesia, Set expectations for post-procedure period and Allowed opportunity for questions and acknowledgement of understanding      Vitals:  Vitals Value Taken Time   /87 12/15/22 1303   Temp     Pulse 80 12/15/22 1310   Resp 17 12/15/22 1310   SpO2 100 % 12/15/22 1310   Vitals shown include unvalidated device data.    Electronically Signed By: SHIV Trejo CRNA  December 15, 2022  1:11 PM

## 2022-12-15 NOTE — PHARMACY-ADMISSION MEDICATION HISTORY
Medication reconciliation interview completed by pre-admitting nurse     Reviewed by Chica Fraser, RN (Registered Nurse) on 12/15/22 at 1056    Reviewed by pharmacy. No further clarifications needed.       Prior to Admission medications    Medication Sig Last Dose Taking? Auth Provider Long Term End Date   acetaminophen (TYLENOL) 325 MG tablet Take 2 tablets (650 mg) by mouth every 6 hours as needed for other (For optimal non-opioid multimodal pain management to improve pain control.) Past Month Yes Alicja Feldman PA-C     cephALEXin (KEFLEX) 500 MG capsule Take 500 mg by mouth 4 times daily 12/14/2022 at 1700 Yes Reported, Patient     COLLAGEN PO Take 1 tablet by mouth daily 12/14/2022 at 0800 Yes Unknown, Entered By History     fish oil-omega-3 fatty acids 1000 MG capsule Take 1 g by mouth daily 12/14/2022 at 0600 Yes Unknown, Entered By History     HYDROcodone-acetaminophen (NORCO) 5-325 MG tablet Take 1 tablet by mouth as needed 12/15/2022 at 0500 Yes Reported, Patient     hypromellose (GENTEAL) 0.3 % SOLN ophthalmic solution Place 1 drop into both eyes every hour as needed for dry eyes 12/15/2022 at 0700 Yes Unknown, Entered By History     multivitamin, therapeutic (THERA-VIT) TABS tablet Take 1 tablet by mouth daily 12/14/2022 at 0600 Yes Unknown, Entered By History     Vitamin D3 (CHOLECALCIFEROL) 125 MCG (5000 UT) tablet Take 125 mcg by mouth daily 12/14/2022 at 0600 Yes Unknown, Entered By History

## 2022-12-15 NOTE — LETTER
Gaby Bills RN Case Manager  Inpatient Care Coordination  [facility]   [phone]    Discharge Orders 12/19/22

## 2022-12-16 ENCOUNTER — APPOINTMENT (OUTPATIENT)
Dept: PHYSICAL THERAPY | Facility: CLINIC | Age: 57
DRG: 857 | End: 2022-12-16
Attending: ORTHOPAEDIC SURGERY
Payer: COMMERCIAL

## 2022-12-16 ENCOUNTER — HOME INFUSION (PRE-WILLOW HOME INFUSION) (OUTPATIENT)
Dept: PHARMACY | Facility: CLINIC | Age: 57
End: 2022-12-16

## 2022-12-16 LAB
CREAT SERPL-MCNC: 0.46 MG/DL (ref 0.51–0.95)
GFR SERPL CREATININE-BSD FRML MDRD: >90 ML/MIN/1.73M2
GLUCOSE SERPL-MCNC: 78 MG/DL (ref 70–99)
HGB BLD-MCNC: 10.4 G/DL (ref 11.7–15.7)
OSMOLALITY SERPL: 269 MMOL/KG (ref 275–295)
SODIUM SERPL-SCNC: 129 MMOL/L (ref 136–145)

## 2022-12-16 PROCEDURE — 85018 HEMOGLOBIN: CPT | Performed by: ORTHOPAEDIC SURGERY

## 2022-12-16 PROCEDURE — 83930 ASSAY OF BLOOD OSMOLALITY: CPT | Performed by: PHYSICIAN ASSISTANT

## 2022-12-16 PROCEDURE — 82947 ASSAY GLUCOSE BLOOD QUANT: CPT | Performed by: ORTHOPAEDIC SURGERY

## 2022-12-16 PROCEDURE — 97116 GAIT TRAINING THERAPY: CPT | Mod: GP | Performed by: PHYSICAL THERAPIST

## 2022-12-16 PROCEDURE — 99222 1ST HOSP IP/OBS MODERATE 55: CPT | Performed by: PHYSICIAN ASSISTANT

## 2022-12-16 PROCEDURE — G0463 HOSPITAL OUTPT CLINIC VISIT: HCPCS

## 2022-12-16 PROCEDURE — 84295 ASSAY OF SERUM SODIUM: CPT | Performed by: PHYSICIAN ASSISTANT

## 2022-12-16 PROCEDURE — 82565 ASSAY OF CREATININE: CPT | Performed by: ORTHOPAEDIC SURGERY

## 2022-12-16 PROCEDURE — 99233 SBSQ HOSP IP/OBS HIGH 50: CPT | Performed by: INTERNAL MEDICINE

## 2022-12-16 PROCEDURE — 258N000003 HC RX IP 258 OP 636: Performed by: ORTHOPAEDIC SURGERY

## 2022-12-16 PROCEDURE — 250N000013 HC RX MED GY IP 250 OP 250 PS 637: Performed by: ORTHOPAEDIC SURGERY

## 2022-12-16 PROCEDURE — 120N000001 HC R&B MED SURG/OB

## 2022-12-16 PROCEDURE — 36415 COLL VENOUS BLD VENIPUNCTURE: CPT | Performed by: ORTHOPAEDIC SURGERY

## 2022-12-16 PROCEDURE — 250N000011 HC RX IP 250 OP 636: Performed by: INTERNAL MEDICINE

## 2022-12-16 PROCEDURE — 97161 PT EVAL LOW COMPLEX 20 MIN: CPT | Mod: GP | Performed by: PHYSICAL THERAPIST

## 2022-12-16 RX ORDER — HYDROXYZINE HYDROCHLORIDE 25 MG/1
25-50 TABLET, FILM COATED ORAL EVERY 6 HOURS PRN
Qty: 60 TABLET | Refills: 0 | Status: SHIPPED | OUTPATIENT
Start: 2022-12-16 | End: 2023-03-03

## 2022-12-16 RX ORDER — HYDROCODONE BITARTRATE AND ACETAMINOPHEN 5; 325 MG/1; MG/1
1-2 TABLET ORAL EVERY 4 HOURS PRN
Qty: 40 TABLET | Refills: 0 | Status: SHIPPED | OUTPATIENT
Start: 2022-12-16 | End: 2022-12-19

## 2022-12-16 RX ADMIN — HYDROCODONE BITARTRATE AND ACETAMINOPHEN 2 TABLET: 5; 325 TABLET ORAL at 05:38

## 2022-12-16 RX ADMIN — TAZOBACTAM SODIUM AND PIPERACILLIN SODIUM 3.38 G: 375; 3 INJECTION, SOLUTION INTRAVENOUS at 13:33

## 2022-12-16 RX ADMIN — SODIUM CHLORIDE, POTASSIUM CHLORIDE, SODIUM LACTATE AND CALCIUM CHLORIDE: 600; 310; 30; 20 INJECTION, SOLUTION INTRAVENOUS at 05:38

## 2022-12-16 RX ADMIN — HYDROXYZINE HYDROCHLORIDE 25 MG: 25 TABLET, FILM COATED ORAL at 17:12

## 2022-12-16 RX ADMIN — HYDROCODONE BITARTRATE AND ACETAMINOPHEN 2 TABLET: 5; 325 TABLET ORAL at 13:36

## 2022-12-16 RX ADMIN — HYDROCODONE BITARTRATE AND ACETAMINOPHEN 1 TABLET: 5; 325 TABLET ORAL at 08:50

## 2022-12-16 RX ADMIN — CEFAZOLIN SODIUM 2 G: 2 INJECTION, SOLUTION INTRAVENOUS at 03:41

## 2022-12-16 RX ADMIN — Medication 125 MCG: at 07:42

## 2022-12-16 RX ADMIN — FAMOTIDINE 20 MG: 20 TABLET ORAL at 07:41

## 2022-12-16 RX ADMIN — FAMOTIDINE 20 MG: 20 TABLET ORAL at 19:31

## 2022-12-16 RX ADMIN — HYDROCODONE BITARTRATE AND ACETAMINOPHEN 2 TABLET: 5; 325 TABLET ORAL at 17:49

## 2022-12-16 RX ADMIN — HYDROXYZINE HYDROCHLORIDE 25 MG: 25 TABLET, FILM COATED ORAL at 03:41

## 2022-12-16 RX ADMIN — ASPIRIN 325 MG: 325 TABLET, COATED ORAL at 07:41

## 2022-12-16 RX ADMIN — HYDROXYZINE HYDROCHLORIDE 25 MG: 25 TABLET, FILM COATED ORAL at 10:51

## 2022-12-16 RX ADMIN — SENNOSIDES AND DOCUSATE SODIUM 1 TABLET: 50; 8.6 TABLET ORAL at 07:41

## 2022-12-16 RX ADMIN — TAZOBACTAM SODIUM AND PIPERACILLIN SODIUM 3.38 G: 375; 3 INJECTION, SOLUTION INTRAVENOUS at 19:32

## 2022-12-16 RX ADMIN — THERA TABS 1 TABLET: TAB at 07:42

## 2022-12-16 RX ADMIN — HYDROCODONE BITARTRATE AND ACETAMINOPHEN 2 TABLET: 5; 325 TABLET ORAL at 01:00

## 2022-12-16 ASSESSMENT — ACTIVITIES OF DAILY LIVING (ADL)
ADLS_ACUITY_SCORE: 26
ADLS_ACUITY_SCORE: 26
ADLS_ACUITY_SCORE: 24
ADLS_ACUITY_SCORE: 24
ADLS_ACUITY_SCORE: 26
ADLS_ACUITY_SCORE: 25
ADLS_ACUITY_SCORE: 26
ADLS_ACUITY_SCORE: 25
ADLS_ACUITY_SCORE: 24
ADLS_ACUITY_SCORE: 25
ADLS_ACUITY_SCORE: 24
ADLS_ACUITY_SCORE: 26

## 2022-12-16 NOTE — CONSULTS
Care Management Initial Consult    General Information  Assessment completed with: Leanne Cai  Type of CM/SW Visit: Offer D/C Planning    Primary Care Provider verified and updated as needed: Yes   Readmission within the last 30 days: no previous admission in last 30 days      Reason for Consult: care coordination/care conference, discharge planning           Communication Assessment  Patient's communication style: spoken language (English or Bilingual)    Hearing Difficulty or Deaf: no   Wear Glasses or Blind: yes    Cognitive  Cognitive/Neuro/Behavioral: WDL                      Living Environment:   People in home: alone            Current Resources:     Equipment currently used at home: cane, straight         Lifestyle & Psychosocial Needs:  Social Determinants of Health     Tobacco Use: High Risk     Smoking Tobacco Use: Every Day     Smokeless Tobacco Use: Never     Passive Exposure: Not on file   Alcohol Use: Not on file   Financial Resource Strain: Not on file   Food Insecurity: Not on file   Transportation Needs: Not on file   Physical Activity: Not on file   Stress: Not on file   Social Connections: Not on file   Intimate Partner Violence: Not on file   Depression: Not at risk     PHQ-2 Score: 0   Housing Stability: Not on file       Care Management Follow Up    Length of Stay (days): 1    Expected Discharge Date: 12/17/2022     Concerns to be Addressed: care coordination/care conferences, discharge planning     Patient plan of care discussed at interdisciplinary rounds: Yes    Anticipated Discharge Disposition: Home, Home Care, Home Infusion, DME     Anticipated Discharge Services: None  Anticipated Discharge DME: Wound Vac    Patient/family educated on Medicare website which has current facility and service quality ratings: no  Education Provided on the Discharge Plan:  Yes  Patient/Family in Agreement with the Plan: yes    Referrals Placed by CM/SW: Homecare, Home Infusion, Durable Medical  Equipment (DME)  Private pay costs discussed: Not applicable    Additional Information:  Care management consulted and following for discharge planning. Planning for discharge to home with wound vac, home care RN, and potential for IV ABX home infusion.   Met with patient at bedside. Discussed plan and recommendations. Patient agreeable to plan.  Referral sent to home infusion requesting benefit check for potential IV ABX upon discharge. Will need home infusion order upon discharge if needing IV ABX.   Home Care referral sent to [facility] ([facility]). [facility] has declined as unable to staff  Order referral started with / Count includes the [facility]. Order # [id number] Order and documentation faxed to Count includes the [facility] F: [phone]    Patient states is in transition with establishing primary care. Will need to attempt to schedule an earlier appointment or verify if orthopedics is willing to follow for home care orders.     Feb 27, 2023 11:30 AM   (Arrive by 11:10 AM)   Provider Visit with Anju Jack DO   [address], [address]   [facility] [id number] [phone]                         Gaby Bills, RN      Gaby Bills RN Case Manager  Inpatient Care Coordination  [facility]   [phone]

## 2022-12-16 NOTE — PROGRESS NOTES
"Orthopedic Surgery  12/16/2022  POD#: 1    S: Patient voices no complaints today except some pain.    O: Blood pressure 126/62, pulse 61, temperature 97.6  F (36.4  C), temperature source Temporal, resp. rate 18, height 1.6 m (5' 3\"), weight 62.8 kg (138 lb 8 oz), last menstrual period 06/20/2012, SpO2 99 %.  Lab Results   Component Value Date    HGB 10.4 12/16/2022    HGB 13.3 07/13/2010     No results found for: INR     I/O last 3 completed shifts:  In: 3005 [P.O.:780; I.V.:2225]  Out: 0     Neurovascularly intact.  Calves are negative bilaterally, both soft and nontender.  The wound is C/D/I. Wound VAC intact, with CAM boot.    A: Ms. Valentino is doing well status post Procedure(s):  Open irrigation and debridement of right distal tibia surgical wound with placement of vacuum-assisted wound closure device.    P:   1. Mobilize and continue physical therapy. WBAT ROM of ankle as tolerated, but quite limited due to wound VAC with exposed tendon. CAM boot for comfort.  2. Open right ankle wound - s/p I&D with wound VAC placement on 12/15.  Abx per ID team, likely IV with PICC line, awaiting surgical cxs.  3. Pain management - controlled on norco and hydroxyzine.  4. Anticipate discharge to home once abx plan arranged, possible PICC line placed, and home health care arranged for VAC changes and possible home infusion services.  OK to discharge from ortho standpoint. Follow up in clinic in 1 week for retention suture removal.    Cyndi Helms PA-C  O: [phone]  "

## 2022-12-16 NOTE — PROGRESS NOTES
[facility]    Infectious Disease Progress Note    Date of Service (when I saw the patient): 12/16/2022     Assessment & Plan   Leanne Valentino is a 57 year old female who was admitted on 12/15/2022.     1. 57 y.o female with history of right distal tibial fracture which was treated with ORIF 2 months ago.  Had some skin necrosis issue and was on oral keflex.   2. Last week she had increase in the redness and drainage and skin breakdown.   3. Admitted now for  open irrigation and debridement with obtaining cultures and then placement of a vacuum-assisted wound closure device.    4. Hard ware left in place   5. Cultures from the OR are pending.   6. On ancef.      Recommendations:   Gram stain with both GNR and GPC further identified as enterobacter and group B strep, follow up on the ANTONI   Place picc   [facility]     Roxy Leonardo MD    Interval History   Tolerating antibiotics ok   No new rashes or issues with antibiotics   Labs reviewed   No changes to past medical, social or family history   Feels ok   A 10 point ROS was done and is negative other than noted in the interval history above     Physical Exam   Temp: 97.6  F (36.4  C) Temp src: Temporal BP: 126/62 Pulse: 61   Resp: 18 SpO2: 99 % O2 Device: None (Room air) Oxygen Delivery: 3 LPM  Vitals:    12/14/22 1700 12/15/22 1047 12/16/22 0945   Weight: 56.7 kg (125 lb) 58.8 kg (129 lb 11.2 oz) 62.8 kg (138 lb 8 oz)     Vital Signs with Ranges  Temp:  [97.1  F (36.2  C)-98.1  F (36.7  C)] 97.6  F (36.4  C)  Pulse:  [] 61  Resp:  [14-18] 18  BP: (115-154)/(62-87) 126/62  SpO2:  [97 %-100 %] 99 %    Constitutional: Awake, alert, cooperative, no apparent distress  Lungs: Clear to auscultation bilaterally, no crackles or wheezing  Cardiovascular: Regular rate and rhythm, normal S1 and S2, and no murmur noted  Abdomen: Normal bowel sounds, soft, non-distended, non-tender  Skin: No rashes, no cyanosis, no  edema  Other:    Medications       acetaminophen  975 mg Oral Q8H     aspirin  325 mg Oral Daily     famotidine  20 mg Oral BID    Or     famotidine  20 mg Intravenous BID     multivitamin, therapeutic  1 tablet Oral Daily     piperacillin-tazobactam  3.375 g Intravenous Q6H     polyethylene glycol  17 g Oral Daily     senna-docusate  1 tablet Oral BID     sodium chloride (PF)  3 mL Intracatheter Q8H     Vitamin D3  125 mcg Oral Daily       Data   All microbiology laboratory data reviewed.  Recent Labs   Lab Test 12/16/22  0715 10/12/22  1141 09/30/22  0754 09/29/22  0744   WBC  --   --  5.9 7.3   HGB 10.4* 13.1 10.1*  10.1* 11.4*   HCT  --   --  30.9* 33.8*   MCV  --   --  98 95   PLT  --   --  190 232     Recent Labs   Lab Test 12/16/22  0715 10/12/22  1141 09/30/22  0754   CR 0.46* 0.54 0.52  0.52     No lab results found.  No lab results found.    Invalid input(s): UC    All cultures:  No results for input(s): CULTURE in the last 168 hours.   Blood culture:  No results found for this or any previous visit.   Urine culture:  No results found for this or any previous visit.

## 2022-12-16 NOTE — PROGRESS NOTES
12/16/22 0918   Appointment Info   Signing Clinician's Name / Credentials (PT) NIK Padilla   Student Supervision On-site supervision provided;Direct supervision provided;Line of sight supervision provided;Direct Patient Contact Provided;Therapy services provided with the co-signing licensed therapist guiding and directing the services, and providing the skilled judgement and assessment throughout the session   Rehab Comments (PT) CAM boot OOB   Living Environment   Living Environment Comments Pt lives in house alone. 3 JONATHAN with bilat rail. Full flight to go downstairs to do laundry, all other needs on main level.   Self-Care   Usual Activity Tolerance good   Current Activity Tolerance moderate   Regular Exercise Yes   Activity/Exercise Type strength training   Exercise Amount/Frequency 2 times/wk   Equipment Currently Used at Home cane, straight   Fall history within last six months yes   Number of times patient has fallen within last six months 1   General Information   Onset of Illness/Injury or Date of Surgery 12/15/22   Referring Physician Raj Gallardo MD   Patient/Family Therapy Goals Statement (PT) return home   Pertinent History of Current Problem (include personal factors and/or comorbidities that impact the POC) Per chart: Ms. Valentino is a very pleasant 57-year-old female who 2 months ago had a right distal tibia fracture that was treated with open reduction and internal fixation.  At that time, she did well, fracture was healing nicely, but she had some necrosis around her distal tibial wound, which we were treating with local wound care, as she continued to progress her healing and advancing her physical therapy.  Unfortunately, over the last week, she has had increased redness, drainage and wound breakdown. Patient is seen POD#1 s/p Open irrigation and debridement of right distal tibia surgical wound with placement of vacuum-assisted wound closure device.   Existing  Precautions/Restrictions other (see comments);weight bearing  (WBAT RLE, CAM boot OOB)   Weight-Bearing Status - RLE weight-bearing as tolerated   Cognition   Affect/Mental Status (Cognition) WNL   Orientation Status (Cognition) oriented x 4   Follows Commands (Cognition) WNL   Pain Assessment   Patient Currently in Pain Yes, see Vital Sign flowsheet  (consistently 7/10 through session)   Integumentary/Edema   Integumentary/Edema Comments Wound Vac applied to RLE   Posture    Posture Forward head position;Protracted shoulders   Range of Motion (ROM)   Range of Motion ROM is WFL   Strength (Manual Muscle Testing)   Strength (Manual Muscle Testing) strength is WFL   Bed Mobility   Comment, (Bed Mobility) Machelle supine<>sit   Transfers   Comment, (Transfers) SBA sit<>stand with SPC   Gait/Stairs (Locomotion)   Distance in Feet 10'   Comment, (Gait/Stairs) SBA with SPC ambulation, SBA and SPC stairs   Balance   Balance Comments requires use of SPC for balance   Sensory Examination   Sensory Perception Comments no deficits noted   Coordination   Coordination no deficits were identified   Muscle Tone   Muscle Tone no deficits were identified   Clinical Impression   Criteria for Skilled Therapeutic Intervention Yes, treatment indicated   PT Diagnosis (PT) impaired mobility s/p Open irrigation and debridement of right distal tibia surgical wound with placement of vacuum-assisted wound closure device.   Influenced by the following impairments impaired balance, decreased activity tolerance   Functional limitations due to impairments impaired bed mobility, transfers, ambulation, stairs   Clinical Presentation (PT Evaluation Complexity) Stable/Uncomplicated   Clinical Presentation Rationale clinical judgement, chart review   Clinical Decision Making (Complexity) low complexity   Planned Therapy Interventions (PT) balance training;bed mobility training;gait training;patient/family education;stair training;strengthening;transfer  training;progressive activity/exercise   Anticipated Equipment Needs at Discharge (PT)   (has cane at home)   Risk & Benefits of therapy have been explained evaluation/treatment results reviewed;care plan/treatment goals reviewed;risks/benefits reviewed;current/potential barriers reviewed;participants voiced agreement with care plan;participants included;patient   PT Total Evaluation Time   PT Eval, Low Complexity Minutes (38036) 8   Plan of Care Review   Plan of Care Reviewed With patient   Physical Therapy Goals   PT Frequency One time eval and treatment only   PT Predicted Duration/Target Date for Goal Attainment 12/16/22   PT Goals Bed Mobility;Transfers;Gait;Stairs   PT: Bed Mobility Modified independent;Supine to/from sit   PT: Transfers Modified independent;Sit to/from stand;Assistive device   PT: Gait Modified independent;Straight cane;100 feet   PT: Stairs Supervision/stand-by assist;Assistive device;Rail on both sides;3 stairs   Interventions   Interventions Quick Adds Gait Training;Therapeutic Activity   Therapeutic Activity   Therapeutic Activities: dynamic activities to improve functional performance Minutes (08858) 4   Symptoms Noted During/After Treatment Fatigue;Increased pain   Treatment Detail/Skilled Intervention Patient found supine upon arrival, agreeable to session. Pt donned CAM boot independently, reviewed WBAT status. Cued to transfer supine<>sit with Machelle. No dizziness sitting EOB. Patient cued for STS with SBA and SPC, progressed to Machelle. Cues for hand placement. Patient returned to bedside chair at end of session, alarm on and needs in reach.   Gait Training   Gait Training Minutes (43544) 9   Symptoms Noted During/After Treatment (Gait Training) increased pain;fatigue   Treatment Detail/Skilled Intervention Patient ambulated in hallway 150' with SBA, SPC and CAM boot, assistance with wound vac navigation. No LOB or dizziness. Cues for foot placement. Patient demonstrated step to gait  pattern. Pt completed 8 stairs with R rail and SPC, SBA and assistance with wound vac navigation. Cues for stair navigation. Patient demonstrated step to pattern.   PT Discharge Planning   PT Plan anticipate discharge   PT Discharge Recommendation (DC Rec) home with assist;home with outpatient physical therapy   PT Rationale for DC Rec Patient presenting below baseline for mobility. Able to complete all mobility Machelle-SBA. Recommend patient return home with outpatient PT, which she already has scheduled.   PT Brief overview of current status Ax1 SPC   Total Session Time   Timed Code Treatment Minutes 13   Total Session Time (sum of timed and untimed services) 21

## 2022-12-16 NOTE — PROGRESS NOTES
Pt has coverage for iv abx through their [facility] plan. Pt has a deductible of $2000 (met $2000). She has an 80/20 coverage. OOP is $5000 (met $2899.97). Once OOP has been met pt should be covered at 100%.    ([facility]) In reference to admission date 12/15 to check to iv abx coverage.         Please contact Intake with any questions, [phone] or In Basket pool,  Home Infusion ([phone]).

## 2022-12-16 NOTE — CONSULTS
[facility]  Hospitalist Consult Note  Name: Leanne Valentino    MRN: [id number]  YOB: 1965    Age: 57 year old  Date of admission: 12/15/2022  Primary care provider: No Ref-Primary, Physician    Requesting Physician:  Dr. Gallardo   Reason for consult:  Post-operative medical management         Assessment and Plan:   Leanne Valentino is a 57 year old female with a history of tobacco dependence, hyponatremia, right tib-fib fracture after fall in September, surgical wound dehiscence of previous right distal tibia ORIF, who was admitted for Open irrigation and debridement of right distal tibia surgical wound with placement of vacuum-assisted wound closure device on 12/15.    Surgical wound dehiscence of previous right distal tibia ORIF s/p Open irrigation and debridement of right distal tibia surgical wound with placement of vacuum-assisted wound closure device on 12/15: The patient is doing well, currently has well controlled pain and is hemodynamically stable. Will defer diet, activity, DVT prophylaxis, and pain control to the primary team.   Currently the patient is on Norco, Tylenol, Atarax. Continue physical and occupational therapy. Social work consulted for possible placement needs. Continue incentive spirometry and check hemoglobin to evaluate for surgical blood loss and potential need for transfusion.  Postop hemoglobin is 10.4.    Infectious disease is following and operative cultures are in process.  Getting IV Ancef currently and likely will require prolonged course of IV antibiotics.    Pt voiced concerned that the Norco may not be adequately treating her pain level and is interested in switching medications. We discussed replacing with oxycodone this afternoon/evening if this is still an issue.  She reported that she did not tolerate hydromorphone well after being hospitalized previously and did have some tramadol for use at home which she is no longer  taking.    Hyponatremia  Na last checked 2 mos ago at 130. Etiology of hyponatremia is somewhat unclear it looks like in the fall this was discovered on preop lab work when she was admitted.   Primary care recommended liberalizing the salt in diet and recheck in 1 month which she is due for.  -If able we will add on sodium to labs this morning or recheck in the a.m. to ensure that this is stabilized have some fluids with surgical orders  -Further work-up if sodium is down trending rest follow-up with primary care    Addendum: Na is 129. Urine osms previously low.  TFTs and uric acid level unremarkable.  Historically sodium did worsen with IV fluids, did have some IV fluids after surery. Serum osms pending. Recheck BMP in AM. Consider fluid restriction.     Tobacco dependence  Patient reports current cigarette smoking but is decreasing her usage.  She has declined NRT.  Continue work towards cessation.        Code status: full  Prophylaxis: Full dose aspirin  Disposition: home once abx established, cleared by surgical team. likely will require C     Thank you for the consultation, we will continue to follow along during the hospitalization. Please page with any questions or concerns.         History of Present Illness:   Leanne Valentino is a 57 year old female with history of a right distal tibial fracture which was treated with ORIF 2 months ago.  She subsequently had skin necrosis and was placed on oral antibiotics with Keflex.  Had been taking tramadol at home for pain.  Last week she had increased drainage, redness and skin breakdown.  She was admitted on 12 /15 for I&D with cultures and placement of a vacuum-assisted wound closure device.  Hardware was left in place.  Cultures were taken from the OR and there were no complications immediately. Pre-operative note was fully reviewed and recommendations acknowledged. Op note and anesthesia notes and flow sheets reviewed.     The patient has had an unremarkable  post-operative course to this point. Currently pain is adequately controlled. No nausea, vomiting, diarrhea or constipation. No fevers, chills, diaphoresis. No chest pain, palpitations, dyspnea. Voiding.  Tolerating oral intake. No excessive somnolence and patient is fully alert and oriented. The patient has no other complaints at this time.                  Past Medical History:     Past Medical History:   Diagnosis Date     Adjustment disorder with mixed anxiety and depressed mood      Leiomyoma of uterus, unspecified              Past Surgical History:     Past Surgical History:   Procedure Laterality Date     HC LAPAROSCOPIC MYOMECTOMY, 1 - 4 INTRAMURAL MYOMAS =<250 GM  09/2006    laparoscopic, pedunculated     LAMINECT/DISCECTOMY, LUMBAR  11/15/11    L4-L5     OPEN REDUCTION INTERNAL FIXATION TIBIA Right 9/29/2022    Procedure: OPEN REDUCTION INTERNAL FIXATION RIGHT TIBIA.;  Surgeon: Raj Gallardo MD;  Location:  OR     [facility] APPENDECTOMY,RUPT APPENDX+ABSCESS  08/1989               Social History:     Social History     Tobacco Use     Smoking status: Every Day     Packs/day: 0.50     Years: 18.00     Pack years: 9.00     Types: Cigarettes     Smokeless tobacco: Never   Substance Use Topics     Alcohol use: Yes     Comment: once or twice weekly             Family History:   Family history was fully reviewed and non-contributory in this case.          Allergies:     Allergies   Allergen Reactions     Sumatriptan Other (See Comments)     Other reaction(s): Tachycardia  tachycardia       Sulfa Drugs Rash             Medications:     Prior to Admission medications    Medication Sig Last Dose Taking? Auth Provider Long Term End Date   acetaminophen (TYLENOL) 325 MG tablet Take 2 tablets (650 mg) by mouth every 6 hours as needed for other (For optimal non-opioid multimodal pain management to improve pain control.) Past Month Yes Alicja Feldman PA-C     cephALEXin (KEFLEX) 500 MG capsule Take 500 mg  "by mouth 4 times daily 12/14/2022 at 1700 Yes Reported, Patient     COLLAGEN PO Take 1 tablet by mouth daily 12/14/2022 at 0800 Yes Unknown, Entered By History     fish oil-omega-3 fatty acids 1000 MG capsule Take 1 g by mouth daily 12/14/2022 at 0600 Yes Unknown, Entered By History     HYDROcodone-acetaminophen (NORCO) 5-325 MG tablet Take 1 tablet by mouth as needed 12/15/2022 at 0500 Yes Reported, Patient     hypromellose (GENTEAL) 0.3 % SOLN ophthalmic solution Place 1 drop into both eyes every hour as needed for dry eyes 12/15/2022 at 0700 Yes Unknown, Entered By History     multivitamin, therapeutic (THERA-VIT) TABS tablet Take 1 tablet by mouth daily 12/14/2022 at 0600 Yes Unknown, Entered By History     Vitamin D3 (CHOLECALCIFEROL) 125 MCG (5000 UT) tablet Take 125 mcg by mouth daily 12/14/2022 at 0600 Yes Unknown, Entered By History         Current hospital administered medication list (MAR) also reviewed.          Review of Systems:     A comprehensive greater than 10 system review of systems was carried out.  Pertinent positives and negatives are noted above.  Otherwise negative for contributory info.            Physical Exam:   Blood pressure 126/62, pulse 61, temperature 97.6  F (36.4  C), temperature source Temporal, resp. rate 18, height 1.6 m (5' 3\"), weight 58.8 kg (129 lb 11.2 oz), last menstrual period 06/20/2012, SpO2 99 %.  Exam:  General: Alert, awake, no acute distress.  HEENT: Normocephalic and atraumatic, eyes anicteric and without scleral injection, EOMI, face symmetric, MMM.  Cardiac: RRR, normal S1, S2. No m/g/r, no LE edema.  Pulmonary: Normal chest rise, normal work of breathing.  Lungs CTAB without crackles or wheezing.  Abdomen: soft, non-tender, non-distended.  Normoactive bowel sounds, no guarding or rebound tenderness.  Extremities: no deformities.  Warm, well perfused.  Skin: no rashes or lesions.  Warm and Dry.  Neuro: No focal deficits.  Speech clear.  Spontaneously moving all " extremities in bed.  Psych: Alert and oriented x3. Appropriate affect.          Data:   As above.    Lakia Willson PA-C  [facility] Hospitalist  December 16, 2022

## 2022-12-16 NOTE — PLAN OF CARE
Goal Outcome Evaluation:      Plan of Care Reviewed With: patient    Overall Patient Progress: improvingOverall Patient Progress: improving         VSS. Afebrile. AO x4. Pt. Voiding. No BM today. Pt. On regular diet with 1500 ml fluid restriction. Pt. States she has roughly had 750 ml today. IVF saline locked. New IV dressing. CAM boot on. Hemovac in place with ace wrap. Pt. Is up with stand by assist. Pt. Is up with stand by assist and use of cane. Pain managed with [place] and atarax. Care coordination paged for setup of home health. Discharge plan is home with home health.

## 2022-12-16 NOTE — PROGRESS NOTES
I met and examined the patient and agree with Cyndi Helms's PA-C note.    Plan:  Agree with ID that IV abx mgmt best, will need PICC line  Pain mgmt  Wound vac for home wound mgmt and follow up with plastic surgery as an outpatient for soft tissue coverage  discharge when home abx and wound vac arranged

## 2022-12-16 NOTE — PROGRESS NOTES
[facility] Nurse Inpatient Assessment     Consulted for: Right leg    Patient History (according to provider note(s):      Leanne Valentino is a 57 year old female who had a right distal tibial fracture which was treated with ORIF 2 months ago.   had some necrosis which was being managed by wound care. But over last week she had increas ein the redness and drainage and skin breakdown.     Areas Assessed:      Patient s/p open irrigation and debridement of right distal tibia surgical wound with placement of vacuum-assisted wound closure device on 12/15/22.  Right leg VAC dressing intact with no signs of leaking.  Next change due Monday 12/19/22.    Milton Yousif RN [facility]  Dept. Pager: [phone]  Dept. Office Number: [phone]

## 2022-12-16 NOTE — PLAN OF CARE
Goal Outcome Evaluation:      Plan of Care Reviewed With: patient    Overall Patient Progress: improvingOverall Patient Progress: improving    Pt A/Ox4, VSS on RA. Pain controlled with PRN Norco, and PRN Atarax. Right dorsi/plantar flexion weak d/t hemovac on right foot. Minimal/no output from hemovac. Pt up with A1, cane and GB. Wears CAM boot OOB. Voiding adequately. Waiting on cultures from surgery. Plan TBD.

## 2022-12-16 NOTE — PROGRESS NOTES
[facility]    Received request for benefit check should pt require home IV abx. Pt Leanne Valentino has coverage for IV abx through their [facility] plan. Pt has a deductible of $2000 (met $2000). She has an 80/20 coverage. OOP is $5000 (met $2899.97). Once OOP has been met pt should be covered at 100%.    I spoke with Leanne to introduce home infusion services, review benefits and offer choice of providers. She would like to proceed with [facility] for home IV abx needs. [facility] is currently looking to coordinate home care for wound vac dressing changes. We will not be able to admit to services until an agency is secured. Please reach out to our nursing staff this weekend with any questions or concerns [phone].    Thank you for the referral    Fiona Pham RN  [facility] Liaison  [phone] (Mon thru Fri 8am - 5pm)  [phone] Office

## 2022-12-16 NOTE — PROGRESS NOTES
Physical Therapy Discharge Summary    Reason for therapy discharge:    Discharged to home.    Progress towards therapy goal(s). See goals on Care Plan in [facility] electronic health record for goal details.  Goals partially met.  Barriers to achieving goals:   discharge from facility.    Therapy recommendation(s):    Patient presenting below baseline for mobility. Able to complete all mobility Machelle-SBA. Recommend patient return home with outpatient PT, which she already has scheduled.

## 2022-12-17 LAB
ANION GAP SERPL CALCULATED.3IONS-SCNC: 8 MMOL/L (ref 7–15)
BUN SERPL-MCNC: 9.9 MG/DL (ref 6–20)
CALCIUM SERPL-MCNC: 9 MG/DL (ref 8.6–10)
CHLORIDE SERPL-SCNC: 102 MMOL/L (ref 98–107)
CREAT SERPL-MCNC: 0.64 MG/DL (ref 0.51–0.95)
DEPRECATED HCO3 PLAS-SCNC: 25 MMOL/L (ref 22–29)
GFR SERPL CREATININE-BSD FRML MDRD: >90 ML/MIN/1.73M2
GLUCOSE SERPL-MCNC: 78 MG/DL (ref 70–99)
HGB BLD-MCNC: 10.7 G/DL (ref 11.7–15.7)
POTASSIUM SERPL-SCNC: 4.5 MMOL/L (ref 3.4–5.3)
SODIUM SERPL-SCNC: 135 MMOL/L (ref 136–145)

## 2022-12-17 PROCEDURE — 250N000013 HC RX MED GY IP 250 OP 250 PS 637: Performed by: ORTHOPAEDIC SURGERY

## 2022-12-17 PROCEDURE — 272N000034 HC KIT VALVED SINGLE LUMEN

## 2022-12-17 PROCEDURE — 99233 SBSQ HOSP IP/OBS HIGH 50: CPT | Performed by: INTERNAL MEDICINE

## 2022-12-17 PROCEDURE — 99232 SBSQ HOSP IP/OBS MODERATE 35: CPT | Performed by: INTERNAL MEDICINE

## 2022-12-17 PROCEDURE — 85018 HEMOGLOBIN: CPT | Performed by: ORTHOPAEDIC SURGERY

## 2022-12-17 PROCEDURE — 36415 COLL VENOUS BLD VENIPUNCTURE: CPT | Performed by: ORTHOPAEDIC SURGERY

## 2022-12-17 PROCEDURE — 82310 ASSAY OF CALCIUM: CPT | Performed by: PHYSICIAN ASSISTANT

## 2022-12-17 PROCEDURE — 250N000011 HC RX IP 250 OP 636: Performed by: INTERNAL MEDICINE

## 2022-12-17 PROCEDURE — 120N000001 HC R&B MED SURG/OB

## 2022-12-17 PROCEDURE — 36569 INSJ PICC 5 YR+ W/O IMAGING: CPT

## 2022-12-17 RX ORDER — ERTAPENEM 1 G/1
1 INJECTION, POWDER, LYOPHILIZED, FOR SOLUTION INTRAMUSCULAR; INTRAVENOUS EVERY 24 HOURS
Qty: 420 ML | Refills: 0 | DISCHARGE
Start: 2022-12-17 | End: 2023-01-28

## 2022-12-17 RX ORDER — ERTAPENEM 1 G/1
1 INJECTION, POWDER, LYOPHILIZED, FOR SOLUTION INTRAMUSCULAR; INTRAVENOUS
Status: DISCONTINUED | OUTPATIENT
Start: 2022-12-17 | End: 2022-12-19 | Stop reason: HOSPADM

## 2022-12-17 RX ADMIN — HYDROCODONE BITARTRATE AND ACETAMINOPHEN 2 TABLET: 5; 325 TABLET ORAL at 07:53

## 2022-12-17 RX ADMIN — TAZOBACTAM SODIUM AND PIPERACILLIN SODIUM 3.38 G: 375; 3 INJECTION, SOLUTION INTRAVENOUS at 08:44

## 2022-12-17 RX ADMIN — TAZOBACTAM SODIUM AND PIPERACILLIN SODIUM 3.38 G: 375; 3 INJECTION, SOLUTION INTRAVENOUS at 01:30

## 2022-12-17 RX ADMIN — SENNOSIDES AND DOCUSATE SODIUM 1 TABLET: 50; 8.6 TABLET ORAL at 19:41

## 2022-12-17 RX ADMIN — Medication 125 MCG: at 07:53

## 2022-12-17 RX ADMIN — FAMOTIDINE 20 MG: 20 TABLET ORAL at 07:54

## 2022-12-17 RX ADMIN — HYDROCODONE BITARTRATE AND ACETAMINOPHEN 2 TABLET: 5; 325 TABLET ORAL at 19:41

## 2022-12-17 RX ADMIN — HYDROXYZINE HYDROCHLORIDE 25 MG: 25 TABLET, FILM COATED ORAL at 11:23

## 2022-12-17 RX ADMIN — SENNOSIDES AND DOCUSATE SODIUM 1 TABLET: 50; 8.6 TABLET ORAL at 07:54

## 2022-12-17 RX ADMIN — HYDROCODONE BITARTRATE AND ACETAMINOPHEN 2 TABLET: 5; 325 TABLET ORAL at 13:46

## 2022-12-17 RX ADMIN — ERTAPENEM SODIUM 1 G: 1 INJECTION, POWDER, LYOPHILIZED, FOR SOLUTION INTRAMUSCULAR; INTRAVENOUS at 13:03

## 2022-12-17 RX ADMIN — HYDROCODONE BITARTRATE AND ACETAMINOPHEN 2 TABLET: 5; 325 TABLET ORAL at 00:57

## 2022-12-17 RX ADMIN — ASPIRIN 325 MG: 325 TABLET, COATED ORAL at 07:54

## 2022-12-17 RX ADMIN — HYDROXYZINE HYDROCHLORIDE 25 MG: 25 TABLET, FILM COATED ORAL at 22:36

## 2022-12-17 RX ADMIN — THERA TABS 1 TABLET: TAB at 07:54

## 2022-12-17 RX ADMIN — FAMOTIDINE 20 MG: 20 TABLET ORAL at 19:42

## 2022-12-17 ASSESSMENT — ACTIVITIES OF DAILY LIVING (ADL)
ADLS_ACUITY_SCORE: 25
ADLS_ACUITY_SCORE: 26
ADLS_ACUITY_SCORE: 25
ADLS_ACUITY_SCORE: 26
ADLS_ACUITY_SCORE: 26
ADLS_ACUITY_SCORE: 25

## 2022-12-17 NOTE — PROGRESS NOTES
[facility]  Hospitalist Progress Note  Vinnie Herron MD 12/17/2022    Reason for Stay (Diagnosis): Postop status post open irrigation debridement of right distal tibia and the surgical wound         Assessment and Plan:      Summary of Stay: Leanne Valentino is a 57 year old female with history of hyponatremia, wound dehiscence of previous right distal tibial ORIF admitted this time status post open irrigation debridement of right distal tibia surgical wound with placement of vacuum-assisted wound closure device on 12/15/2022    Problem List:      1. Surgical wound dehiscence of previous right distal tibia ORIF:  2. S/p Open irrigation and debridement with wound VAC placement:   -Overall she is doing well,  -Pain is fairly controlled.  -She is on IV antibiotic .  -PICC line is placed.  -Wound VAC is in place.  -Wound culture grew Enterobacter coli assay complex and Streptococcus agalactiae  -There was a plan for patient to discharge today but did not get all the supplies she needs for wound VAC.  -Wound care, DVT prophylaxis, pain control per primary team.  -Encourage incentive spirometry.  -Postop hemoglobin is 10.7 today, stable no need to trend it.  -ID consulted, patient is currently on Invanz 1 g IV daily which she will continue as an outpatient.      Hyponatremia  -Improved sodium was 135 today.  -Other electrolytes are stable .  -Encourage oral intake .     Tobacco use disorder  -Cessation counseling.    Clinically Significant Risk Factors         # Hyponatremia: Lowest Na = 129 mmol/L in last 2 days, will monitor as appropriate                        DVT Prophylaxis: Defer to primary service  Code Status: Full Code  Discharge Dispo: Home with home infusion, and wound VAC likely she needs follow-up as an outpatient for that.  Estimated Disch Date / # of Days until Disch: 1 to 2 days when all the supplies are available for her over the weekend  I discussed with patient the plan of  "care      Interval History (Subjective):      Patient seen and examined, assumed care today, no new overnight issues, she was about to be discharged but did not have the supplies for the wound VAC and needed to stay in the hospital.                  Physical Exam:      Last Vital Signs:  BP (!) 146/86 (BP Location: Right arm)   Pulse 71   Temp 97.4  F (36.3  C) (Temporal)   Resp 16   Ht 1.6 m (5' 3\")   Wt 62.8 kg (138 lb 8 oz)   LMP 06/20/2012   SpO2 99%   BMI 24.53 kg/m      I/O last 3 completed shifts:  In: 1600 [P.O.:1240; I.V.:360]  Out: 100 [Drains:100]  Vitals:    12/14/22 1700 12/15/22 1047 12/16/22 0945   Weight: 56.7 kg (125 lb) 58.8 kg (129 lb 11.2 oz) 62.8 kg (138 lb 8 oz)     Current Facility-Administered Medications   Medication     [START ON 12/18/2022] acetaminophen (TYLENOL) tablet 650 mg     acetaminophen (TYLENOL) tablet 975 mg     aspirin (ASA) EC tablet 325 mg     benzocaine-menthol (CHLORASEPTIC) 6-10 MG lozenge 1 lozenge     bisacodyl (DULCOLAX) suppository 10 mg     diphenhydrAMINE (BENADRYL) capsule 25 mg     ertapenem (INVanz) 1 g vial to attach to  mL bag     famotidine (PEPCID) tablet 20 mg    Or     famotidine (PEPCID) injection 20 mg     HYDROcodone-acetaminophen (NORCO) 5-325 MG per tablet 1-2 tablet     HYDROmorphone (DILAUDID) injection 0.2 mg    Or     HYDROmorphone (DILAUDID) injection 0.4 mg     hydrOXYzine (ATARAX) tablet 25 mg     hypromellose (GENTEAL) 0.3 % ophthalmic solution 1 drop     lidocaine (LMX4) cream     lidocaine 1 % 0.1-1 mL     lidocaine 1 % 0.1-5 mL     magnesium hydroxide (MILK OF MAGNESIA) suspension 30 mL     multivitamin, therapeutic (THERA-VIT) tablet 1 tablet     naloxone (NARCAN) injection 0.2 mg    Or     naloxone (NARCAN) injection 0.4 mg    Or     naloxone (NARCAN) injection 0.2 mg    Or     naloxone (NARCAN) injection 0.4 mg     ondansetron (ZOFRAN ODT) ODT tab 4 mg    Or     ondansetron (ZOFRAN) injection 4 mg     oxyCODONE (ROXICODONE) " tablet 5 mg    Or     oxyCODONE IR (ROXICODONE) tablet 10 mg     polyethylene glycol (MIRALAX) Packet 17 g     prochlorperazine (COMPAZINE) injection 10 mg    Or     prochlorperazine (COMPAZINE) tablet 10 mg     senna-docusate (SENOKOT-S/PERICOLACE) 8.6-50 MG per tablet 1 tablet     sodium chloride (PF) 0.9% PF flush 10-20 mL     sodium chloride (PF) 0.9% PF flush 10-40 mL     sodium chloride (PF) 0.9% PF flush 10-40 mL     sodium chloride (PF) 0.9% PF flush 10-40 mL     sodium chloride (PF) 0.9% PF flush 3 mL     sodium chloride (PF) 0.9% PF flush 3 mL     Vitamin D3 (CHOLECALCIFEROL) tablet 125 mcg       Constitutional: Awake, alert, cooperative, no apparent distress   Respiratory: Clear to auscultation bilaterally, no crackles or wheezing   Cardiovascular: Regular rate and rhythm, normal S1 and S2, and no murmur noted   Abdomen: Normal bowel sounds, soft, non-distended, non-tender   Skin: No rashes, no cyanosis, dry to touch   Neuro: Alert and oriented x3, no weakness, numbness, memory loss   Extremities: No edema, right leg and foot area dressed, wound VAC in place normal range of motion   Other(s):HEENT Pink, un icteric, moist mucosa.       All other systems: Negative          Medications:      All current medications were reviewed with changes reflected in problem list.         Data:      All new lab and imaging data was reviewed.   Labs:  Recent Labs   Lab 12/17/22 0641 12/16/22  0715   * 129*   POTASSIUM 4.5  --    CHLORIDE 102  --    CO2 25  --    ANIONGAP 8  --    GLC 78 78   BUN 9.9  --    CR 0.64 0.46*   GFRESTIMATED >90 >90   ALICE 9.0  --      Recent Labs   Lab 12/17/22  0641 12/16/22  0715   HGB 10.7* 10.4*     No results for input(s): SED, CRP in the last 168 hours.  Recent Labs   Lab 12/17/22  0641 12/16/22  0715   GLC 78 78      Imaging:   Results for orders placed or performed during the hospital encounter of 12/15/22   XR Ankle Port Right G/E 3 Views    Narrative    RIGHT ANKLE PORTABLE  THREE OR MORE VIEWS   12/15/2022 1:59 PM     HISTORY:  Status post ORIF.    COMPARISON: Radiographs from 9/29/2022.      Impression    IMPRESSION: Interval ORIF of the distal tibia and fibular fractures.  Excellent position and alignment. No complication evident. There are a  couple of tubular foreign bodies projecting over the anteromedial  aspect of the lower leg which are nonspecific and for which clinical  correlation is needed.    KHADAR PETER MD         SYSTEM ID:  [id number]

## 2022-12-17 NOTE — DISCHARGE INSTRUCTIONS
Your home infusion referral was sent to [facility]  If you haven't heard from them regarding supply delivery, nurse visits, or have questions,  Please call them at [phone]    The Wound Vac is being supplied through [employer] [phone]. Please refer to information given to you with the home vac for questions or follow up.   [facility] will be providing a nurse for wound vac dressing changes. Please call them with any questions or concerns.     Negative pressure wound therapy plan:  Wound location: Right shin   Change Days: Mon/Wed/Fri    Supplies (including all accessories) used: medium Black foam  and Adaptic/Curity oil emulsion contact layer   Cleanse with MicroKlenz prior to replacing VAC    Suction setting: -125   Methods used: Apply adaptic over exposed tendon before black foam

## 2022-12-17 NOTE — PROGRESS NOTES
[facility] [place]    Infectious Disease Progress Note    Date of Service (when I saw the patient): 12/17/2022     Assessment & Plan   Leanne Valentino is a 57 year old female who was admitted on 12/15/2022.     1. 57 y.o female with history of right distal tibial fracture which was treated with ORIF 2 months ago.  Had some skin necrosis issue and was on oral keflex.   2. Last week she had increase in the redness and drainage and skin breakdown.   3. Admitted now for  open irrigation and debridement with obtaining cultures and then placement of a vacuum-assisted wound closure device.    4. Hard ware left in place   5. Cultures from the OR are pending.   6. On ancef.      Recommendations:   Group B strep and enterobacter in the cultures   Switch now from zosyn to ertapenem   lucien for 6 weeks of IV ertapenem for discharge ( ordered)   Follow up with me in 5 weeks     Roxy Leonardo MD    Interval History   Tolerating antibiotics ok   No new rashes or issues with antibiotics   Labs reviewed   No changes to past medical, social or family history   Feels ok   A 10 point ROS was done and is negative other than noted in the interval history above     Physical Exam   Temp: 97.4  F (36.3  C) Temp src: Temporal BP: (!) 146/86 Pulse: 71   Resp: 16 SpO2: 99 % O2 Device: None (Room air)    Vitals:    12/14/22 1700 12/15/22 1047 12/16/22 0945   Weight: 56.7 kg (125 lb) 58.8 kg (129 lb 11.2 oz) 62.8 kg (138 lb 8 oz)     Vital Signs with Ranges  Temp:  [97.1  F (36.2  C)-97.4  F (36.3  C)] 97.4  F (36.3  C)  Pulse:  [66-71] 71  Resp:  [16] 16  BP: (114-146)/(65-86) 146/86  SpO2:  [97 %-100 %] 99 %    Constitutional: Awake, alert, cooperative, no apparent distress  Lungs: Clear to auscultation bilaterally, no crackles or wheezing  Cardiovascular: Regular rate and rhythm, normal S1 and S2, and no murmur noted  Abdomen: Normal bowel sounds, soft, non-distended, non-tender  Skin: No rashes, no cyanosis, no  edema  Other:    Medications       acetaminophen  975 mg Oral Q8H     aspirin  325 mg Oral Daily     ertapenem  1 g Intravenous Q24H APIRL     famotidine  20 mg Oral BID    Or     famotidine  20 mg Intravenous BID     multivitamin, therapeutic  1 tablet Oral Daily     polyethylene glycol  17 g Oral Daily     senna-docusate  1 tablet Oral BID     sodium chloride (PF)  10-40 mL Intracatheter Q7 Days     sodium chloride (PF)  3 mL Intracatheter Q8H     Vitamin D3  125 mcg Oral Daily       Data   All microbiology laboratory data reviewed.  Recent Labs   Lab Test 12/17/22  0641 12/16/22  0715 10/12/22  1141 09/30/22  0754 09/29/22  0744   WBC  --   --   --  5.9 7.3   HGB 10.7* 10.4* 13.1 10.1*  10.1* 11.4*   HCT  --   --   --  30.9* 33.8*   MCV  --   --   --  98 95   PLT  --   --   --  190 232     Recent Labs   Lab Test 12/17/22  0641 12/16/22  0715 10/12/22  1141   CR 0.64 0.46* 0.54     No lab results found.  No lab results found.    Invalid input(s): UC    All cultures:  Recent Labs   Lab 12/15/22  1239 12/15/22  1236 12/15/22  1233   CULTURE Culture in progress  1+ Enterobacter cloacae complex*  1+ Streptococcus agalactiae (Group B Streptococcus)*  No anaerobic organisms isolated after 1 day Culture in progress  1+ Enterobacter cloacae complex*  1+ Streptococcus agalactiae (Group B Streptococcus)*  No anaerobic organisms isolated after 1 day Culture in progress  1+ Enterobacter cloacae complex*  1+ Streptococcus agalactiae (Group B Streptococcus)*  No anaerobic organisms isolated after 1 day      Blood culture:  No results found for this or any previous visit.   Urine culture:  No results found for this or any previous visit.

## 2022-12-17 NOTE — PROGRESS NOTES
VSS. Afebrile. AO x4. Pt. Voiding. No BM today. Pt. On regular diet with 1500 ml fluid restriction. Pt. States she has roughly had 800 ml today. IVF saline locked. PICC inserted today via [facility]. CAM boot on. Hemovac in place with ace wrap. Pt. Is up with stand by assist. Pt. Declines bed/chair alarm at this time. Pt. Is up with stand by assist and use of cane. Pain managed with Norco and atarax. Pt. Hoping to go home today. Several conversations with care coordination today trying to create a plan for home health and wound vac. So far no plan at this time.

## 2022-12-17 NOTE — PLAN OF CARE
Goal Outcome Evaluation:      Plan of Care Reviewed With: patient    3pm-11pm RN    Patient VS are at baseline: Yes  Patient able to ambulate as they were prior to admission or with assist devices provided by therapy during their stay: Yes using a cane  Patient must void prior to discharge: Yes  Patient able to tolerate oral intake: Yes  Patient has adequate pain control using oral analgesics: Yes    Patient on a fluid restriction of 1500. Is up with stand by assist using a cane. Narco used for pain management. Wound vac patent. Dressing on right lower leg CDI, CMS intact. Wears a cam boot when OOB. Plan is for Picc line placement tomorrow and then discharge home with home care.

## 2022-12-17 NOTE — PROGRESS NOTES
Care Management Follow Up    Length of Stay (days): 2    Expected Discharge Date: 12/17/2022     Concerns to be Addressed: care coordination/care conferences, discharge planning     Patient plan of care discussed at interdisciplinary rounds: Yes    Anticipated Discharge Disposition: Home, Home Care, Home Infusion, DME     Anticipated Discharge Services: None  Anticipated Discharge DME: Wound Vac    Patient/family educated on Medicare website which has current facility and service quality ratings: no  Education Provided on the Discharge Plan:  Yes  Patient/Family in Agreement with the Plan: yes    Referrals Placed by CM/SW: Homecare, Home Infusion, Durable Medical Equipment (DME)  Private pay costs discussed: Not applicable    Additional Information:  Discharge Planning:  /[facility] has approved Wound Vac for home. Inpatient CM will deliver home vac to patient bedside prior to discharge.  Patient has chosen [facility] for IV ABX upon discharge.   [facility] [phone] is arranging for home care nursing services for wound vac dressing changes.   Will need confirmation of home care services for wound vac management prior to patient discharge.     Update 0930: Spoke with home infusion stating unable to arrange for home care RN for wound vac dressing changes as patient is not homebound.   Unable to make any outpatient arrangements d/t closed on weekends.     Update 1050: Home Infusion will not be able to start services until all plans in place for discharge, including wound vac dressing changes.   Home Infusion noted potential of [facility]. Left  with intake at [phone].    Update 1220: [facility], [facility], and [facility] have declined as out of network for patient insurance.     Update 1415:   Home Care agencies Declined- [facility], [facility], [facility], [facility], [facility], [facility], [facility]  Home Care agencies Closed on weekends: [facility], [facility], [facility], [facility]  Referrals sent and awaiting update: [facility], [facility], [facility], [facility], [facility], [facility], [facility], [facility], [facility], [facility]      Gaby Bills, RN      Gaby Bills RN Case Manager  Inpatient Care Coordination  [facility]   [phone]

## 2022-12-17 NOTE — PLAN OF CARE
Goal Outcome Evaluation:      Plan of Care Reviewed With: patient             Patient vital signs are at baseline: Yes  Patient able to ambulate as they were prior to admission or with assist devices provided by therapies during their stay:  Yes  Patient MUST void prior to discharge:  Yes  Patient able to tolerate oral intake:  Yes  Pain has adequate pain control using Oral analgesics:  Yes  Does patient have an identified :  Yes  Has goal D/C date and time been discussed with patient:  Yes      Patient alert oriented x4, on a fluid restriction of 1500. Is up with stand by assist using a cane. Schedule tylenol was hold in this shift bc Narco used for pain management. Wound vac patent. Dressing on right lower leg CDI, CMS intact.Plan is for Picc line placement today and then discharge home with home care.

## 2022-12-17 NOTE — PROCEDURES
[facility]    Single Lumen PICC Placement    Date/Time: 12/17/2022 11:01 AM  Performed by: Michael Hair RN  Authorized by: Roxy Leonardo MD   Indications: vascular access      UNIVERSAL PROTOCOL   Site Marked: Yes  Prior Images Obtained and Reviewed:  Yes  Required items: Required blood products, implants, devices and special equipment available    Patient identity confirmed:  Verbally with patient, arm band and hospital-assigned identification number  NA - No sedation, light sedation, or local anesthesia  Confirmation Checklist:  Patient's identity using two indicators, relevant allergies, procedure was appropriate and matched the consent or emergent situation and correct equipment/implants were available  Time out: Immediately prior to the procedure a time out was called    Universal Protocol: the Joint Commission Universal Protocol was followed    Preparation: Patient was prepped and draped in usual sterile fashion       ANESTHESIA    Anesthesia: Local infiltration  Local Anesthetic:  Lidocaine 1% without epinephrine  Anesthetic Total (mL):  1      SEDATION    Patient Sedated: No        Preparation: skin prepped with ChloraPrep  Skin prep agent: skin prep agent completely dried prior to procedure  Sterile barriers: maximum sterile barriers were used: cap, mask, sterile gown, sterile gloves, and large sterile sheet  Hand hygiene: hand hygiene performed prior to central venous catheter insertion  Type of line used: PICC  Catheter type: single lumen  Lumen type: power PICC  Catheter size: 4 Fr  Brand: Bard  Lot number: [id number]  Placement method: venipuncture, MST, ultrasound and tip navigation system  Number of attempts: 1  Difficulty threading catheter: no  Successful placement: yes  Orientation: right  Catheter to Vein (%): 7  Location: basilic vein (5.0mm)  Tip Location: SVC/RA Junction  Arm circumference: adults 10 cm  Extremity circumference: 22.5  Visible catheter length: 2  Total  catheter length: 39  Dressing and securement: alcohol impregnated caps, blood cleaned with CHG, blood removed, chlorhexidine disc applied, sterile dressing applied, site cleansed and subcutaneous anchor securement system  Post procedure assessment: blood return through all ports, free fluid flow and placement verified by 3CG technology  PROCEDURE      PICC ok to use, verified with 3cg Tip Confirmation, Roz Cruz RN informed

## 2022-12-18 LAB
BACTERIA TISS BX CULT: ABNORMAL
BACTERIA TISS BX CULT: ABNORMAL

## 2022-12-18 PROCEDURE — 120N000001 HC R&B MED SURG/OB

## 2022-12-18 PROCEDURE — 99232 SBSQ HOSP IP/OBS MODERATE 35: CPT | Performed by: INTERNAL MEDICINE

## 2022-12-18 PROCEDURE — 250N000011 HC RX IP 250 OP 636: Performed by: INTERNAL MEDICINE

## 2022-12-18 PROCEDURE — 250N000013 HC RX MED GY IP 250 OP 250 PS 637: Performed by: ORTHOPAEDIC SURGERY

## 2022-12-18 RX ADMIN — HYDROCODONE BITARTRATE AND ACETAMINOPHEN 2 TABLET: 5; 325 TABLET ORAL at 09:09

## 2022-12-18 RX ADMIN — HYDROXYZINE HYDROCHLORIDE 25 MG: 25 TABLET, FILM COATED ORAL at 07:44

## 2022-12-18 RX ADMIN — THERA TABS 1 TABLET: TAB at 07:44

## 2022-12-18 RX ADMIN — FAMOTIDINE 20 MG: 20 TABLET ORAL at 21:55

## 2022-12-18 RX ADMIN — SENNOSIDES AND DOCUSATE SODIUM 1 TABLET: 50; 8.6 TABLET ORAL at 08:08

## 2022-12-18 RX ADMIN — ASPIRIN 325 MG: 325 TABLET, COATED ORAL at 07:43

## 2022-12-18 RX ADMIN — HYDROCODONE BITARTRATE AND ACETAMINOPHEN 2 TABLET: 5; 325 TABLET ORAL at 17:12

## 2022-12-18 RX ADMIN — HYDROXYZINE HYDROCHLORIDE 25 MG: 25 TABLET, FILM COATED ORAL at 14:53

## 2022-12-18 RX ADMIN — ERTAPENEM SODIUM 1 G: 1 INJECTION, POWDER, LYOPHILIZED, FOR SOLUTION INTRAMUSCULAR; INTRAVENOUS at 08:08

## 2022-12-18 RX ADMIN — FAMOTIDINE 20 MG: 20 TABLET ORAL at 07:44

## 2022-12-18 RX ADMIN — Medication 125 MCG: at 07:43

## 2022-12-18 RX ADMIN — HYDROXYZINE HYDROCHLORIDE 25 MG: 25 TABLET, FILM COATED ORAL at 21:55

## 2022-12-18 RX ADMIN — HYDROCODONE BITARTRATE AND ACETAMINOPHEN 2 TABLET: 5; 325 TABLET ORAL at 03:11

## 2022-12-18 ASSESSMENT — ACTIVITIES OF DAILY LIVING (ADL)
ADLS_ACUITY_SCORE: 25

## 2022-12-18 NOTE — PROGRESS NOTES
[facility]  Hospitalist Progress Note  Vinnie Herron MD 12/18/2022    Reason for Stay (Diagnosis): Postop status post open irrigation debridement of right distal tibia and the surgical wound         Assessment and Plan:      Summary of Stay: Leanne Valentino is a 57 year old female with history of hyponatremia, wound dehiscence of previous right distal tibial ORIF admitted this time status post open irrigation debridement of right distal tibia surgical wound with placement of vacuum-assisted wound closure device on 12/15/2022    Problem List:      1. Surgical wound dehiscence of previous right distal tibia ORIF:  2. S/p Open irrigation and debridement with wound VAC placement:   -Overall she is doing well.  -Pain is fairly controlled.  -She is on IV antibiotic .  -PICC line is placed.  -Wound VAC is in place.  -Wound culture grew Enterobacter coli assay complex and Streptococcus agalactiae  -There was a plan for patient to discharge today but did not get all the supplies she needs for wound VAC.  -Wound care, DVT prophylaxis, pain control per primary team.  -Encourage incentive spirometry.  -Postop hemoglobin is 10.7 today, stable no need to trend it.  -ID consulted, patient is currently on Invanz 1 g IV daily which she will continue as an outpatient for 6 weeks per ID.  -weight bearing status per ortho, able to put weight on her foot.       Hyponatremia  -Improved sodium was 135 today.  -Other electrolytes are stable .  -Encourage oral intake .     Tobacco use disorder  -Cessation counseling.    Clinically Significant Risk Factors         # Hyponatremia: Lowest Na = 135 mmol/L in last 2 days, will monitor as appropriate                        DVT Prophylaxis: Defer to primary service  Code Status: Full Code  Discharge Dispo: Home with home infusion, and wound VAC.  Estimated Disch Date / # of Days until Disch: tomorrow if all the supply and follow up are in place for her.  I discussed with  "patient the plan of care      Interval History (Subjective):      Patient seen and examined, no new overnight issues, she stayed in hospital there  was no available supply for her wound VAC and needed to stay in the hospital.                  Physical Exam:      Last Vital Signs:  /75 (BP Location: Left arm)   Pulse 79   Temp 98.1  F (36.7  C) (Tympanic)   Resp 16   Ht 1.6 m (5' 3\")   Wt 62.8 kg (138 lb 8 oz)   LMP 06/20/2012   SpO2 97%   BMI 24.53 kg/m      I/O last 3 completed shifts:  In: 1280 [P.O.:1280]  Out: 100 [Drains:100]  Vitals:    12/14/22 1700 12/15/22 1047 12/16/22 0945   Weight: 56.7 kg (125 lb) 58.8 kg (129 lb 11.2 oz) 62.8 kg (138 lb 8 oz)     Current Facility-Administered Medications   Medication     acetaminophen (TYLENOL) tablet 650 mg     acetaminophen (TYLENOL) tablet 975 mg     aspirin (ASA) EC tablet 325 mg     benzocaine-menthol (CHLORASEPTIC) 6-10 MG lozenge 1 lozenge     bisacodyl (DULCOLAX) suppository 10 mg     diphenhydrAMINE (BENADRYL) capsule 25 mg     ertapenem (INVanz) 1 g vial to attach to  mL bag     famotidine (PEPCID) tablet 20 mg    Or     famotidine (PEPCID) injection 20 mg     HYDROcodone-acetaminophen (NORCO) 5-325 MG per tablet 1-2 tablet     HYDROmorphone (DILAUDID) injection 0.2 mg    Or     HYDROmorphone (DILAUDID) injection 0.4 mg     hydrOXYzine (ATARAX) tablet 25 mg     hypromellose (GENTEAL) 0.3 % ophthalmic solution 1 drop     lidocaine (LMX4) cream     lidocaine 1 % 0.1-1 mL     lidocaine 1 % 0.1-5 mL     magnesium hydroxide (MILK OF MAGNESIA) suspension 30 mL     multivitamin, therapeutic (THERA-VIT) tablet 1 tablet     naloxone (NARCAN) injection 0.2 mg    Or     naloxone (NARCAN) injection 0.4 mg    Or     naloxone (NARCAN) injection 0.2 mg    Or     naloxone (NARCAN) injection 0.4 mg     ondansetron (ZOFRAN ODT) ODT tab 4 mg    Or     ondansetron (ZOFRAN) injection 4 mg     oxyCODONE (ROXICODONE) tablet 5 mg    Or     oxyCODONE IR " (ROXICODONE) tablet 10 mg     polyethylene glycol (MIRALAX) Packet 17 g     prochlorperazine (COMPAZINE) injection 10 mg    Or     prochlorperazine (COMPAZINE) tablet 10 mg     senna-docusate (SENOKOT-S/PERICOLACE) 8.6-50 MG per tablet 1 tablet     sodium chloride (PF) 0.9% PF flush 10-20 mL     sodium chloride (PF) 0.9% PF flush 10-40 mL     sodium chloride (PF) 0.9% PF flush 10-40 mL     sodium chloride (PF) 0.9% PF flush 10-40 mL     sodium chloride (PF) 0.9% PF flush 3 mL     sodium chloride (PF) 0.9% PF flush 3 mL     Vitamin D3 (CHOLECALCIFEROL) tablet 125 mcg       Constitutional: Awake, alert, cooperative, no apparent distress   Respiratory: Clear to auscultation bilaterally, no crackles or wheezing   Cardiovascular: Regular rate and rhythm, normal S1 and S2, and no murmur noted   Abdomen: Normal bowel sounds, soft, non-distended, non-tender   Skin: No rashes, no cyanosis, dry to touch   Neuro: Alert and oriented x3, no weakness, numbness, memory loss   Extremities: No edema, right leg and foot area dressed, wound VAC in place normal range of motion   Other(s):HEENT Pink, un icteric, moist mucosa.       All other systems: Negative          Medications:      All current medications were reviewed with changes reflected in problem list.         Data:      All new lab and imaging data was reviewed.   Labs:  Recent Labs   Lab 12/17/22  0641 12/16/22  0715   * 129*   POTASSIUM 4.5  --    CHLORIDE 102  --    CO2 25  --    ANIONGAP 8  --    GLC 78 78   BUN 9.9  --    CR 0.64 0.46*   GFRESTIMATED >90 >90   ALICE 9.0  --      Recent Labs   Lab 12/17/22  0641 12/16/22  0715   HGB 10.7* 10.4*     No results for input(s): SED, CRP in the last 168 hours.  Recent Labs   Lab 12/17/22  0641 12/16/22  0715   GLC 78 78      Imaging:   Results for orders placed or performed during the hospital encounter of 12/15/22   XR Ankle Port Right G/E 3 Views    Narrative    RIGHT ANKLE PORTABLE THREE OR MORE VIEWS   12/15/2022 1:59  PM     HISTORY:  Status post ORIF.    COMPARISON: Radiographs from 9/29/2022.      Impression    IMPRESSION: Interval ORIF of the distal tibia and fibular fractures.  Excellent position and alignment. No complication evident. There are a  couple of tubular foreign bodies projecting over the anteromedial  aspect of the lower leg which are nonspecific and for which clinical  correlation is needed.    KHADAR PETER MD         SYSTEM ID:  [id number]

## 2022-12-18 NOTE — PLAN OF CARE
Goal Outcome Evaluation:    3pm-11pm RN    Patient VS are at baseline: Yes  Patient able to ambulate as they were prior to admission or with assist devices provided by therapy during their stay:Yes  Patient must void prior to discharge: Yes  Patient able to tolerate oral intake: Yes  Patient has adequate pain control using oral analgesics: Yes    Patient up independently in room using a cane. Wound vac in place, dressing on right ankle CDI. CMS intact. Has a PICC line for antibiotics. Narco and atarax used for pain management. Plan is to discharge home with home care once agency is found.

## 2022-12-18 NOTE — PLAN OF CARE
Goal Outcome Evaluation:      Plan of Care Reviewed With: patient         Patient vital signs are at baseline: Yes  Patient able to ambulate as they were prior to admission or with assist devices provided by therapies during their stay:  Yes  Patient MUST void prior to discharge:  Yes  Patient able to tolerate oral intake:  Yes  Pain has adequate pain control using Oral analgesics:  Yes  Does patient have an identified :  Yes  Has goal D/C date and time been discussed with patient:  Yes    Plan for discharge today home with home care

## 2022-12-18 NOTE — PROGRESS NOTES
Orthopedic Surgery  Leanne Valentino  DATE: 12/18/2022    Admit Date:  12/15/2022    A/P:  Leanne Valentino is a 57 year old female with a history of tobacco dependence, hyponatremia, right tib-fib fracture after fall in September, surgical wound dehiscence of previous right distal tibia ORIF who was admitted 12/15/2022 for I&D at right distal tibia surgical site with placement of wound VAC.      #Surgical wound dehiscence and deep space infection s/p prior ORIF right Tib/Fib Sept 2022, s/p ID and wound VAC placement, #3 Days Post-Op   No periop complications noted.  Gram stain GNR and GPC with culture/speciation positive for GBS and enterobacter.  Patient is very frustrated by unclear discharge plan.  Sounds like her insurance covers home infusion (IV Abx) and home wound care however, the home care agencies covered by her insurance either do not have availability or do not have nurses that do vac changes.  Patient reports she is willing to stay tonight but is adamant she will not stay past 2PM tomorrow because that is when she has a ride.  Discussed with Social Work.  Will place WOC consult for VAC change (due tomorrow) and to see if they can help loop patient in to vac changes with Wound Care clinic however, if this is not a possibility and if cannot find a home care agency to perform VAC changes, patient will be unable to discharge home with VAC and instead will need to do wet-to-dry dressing changes or whatever alternative wound care plan is recommended by WOC.     PLAN:   -  WBAT BLE  -  PICC  -  Invanz (per ID)  -  Wound VAC.  Due for dressing change tomorrow.  WOC consult, as above.  Appreciate assistance with wound care plan (outpatient WOC versus alternative wound care dressing plan if cannot go home with VAC)  -  Pain control  -  Bowel regimen  -   for DVT PPx    #Disposition  -  Anticipate discharge home once IV Abx and Wound Care plans clarified.  As above, patient is states she is planning to  leave by 2PM tomorrow even if no good wound care plan is in place.   -  Follow-up with Ortho as instructed by Dr Sami Vale [facility]  [phone]  Text Page (7AM - 5PM)    _________________________________________________________    INTERVAL HISTORY:  No overnight events.  Resting in chair.  Frustrated and upset.  Wants to go home.  Feels like she is getting mixed messages about what the complications are with wound care plan and what options are available to her.  Believes she could arrange for a ride perhaps 1x/week to get to wound care clinic but not more than that.  Has a ride set up for 2PM tomorrow and reports she will be leaving at 2PM regardless of whether or not there is a good wound care plan in place.        PHYSICAL EXAM:  Vital Signs: Temp: 98.1  F (36.7  C) Temp src: Tympanic BP: 135/75 Pulse: 79   Resp: 16 SpO2: 97 % O2 Device: None (Room air) Oxygen Delivery: 3 LPM  I/O last 3 completed shifts:  In: 1280 [P.O.:1280]  Out: 100 [Drains:100]  Vitals:    12/14/22 1700 12/15/22 1047 12/16/22 0945   Weight: 56.7 kg (125 lb) 58.8 kg (129 lb 11.2 oz) 62.8 kg (138 lb 8 oz)       Pleasant, cooperative.  Nontoxic.  NAD   NCAT.  EOM grossly intact bilaterally. MMM  Respirations unlabored  VAC with good seal  Mild erythema and induration of skin surrounding incision.  PICC in place  Strength intact, symmetric in distal BLE flexors and extensors  SILT  NVI, Digits WWP      LABORATORY DATA:  I reviewed all medications, new labs and imaging results over the last 24 hours.  Recent Labs   Lab Test 12/17/22  0641 12/16/22  0715 10/12/22  1141 09/30/22  1302 09/30/22  0754   * 129* 130*   < > 128*   POTASSIUM 4.5  --  3.6  --  3.9   BUN 9.9  --  11  --  7   CR 0.64 0.46* 0.54  --  0.52  0.52    < > = values in this interval not displayed.     Recent Labs   Lab Test 12/17/22  0641 12/16/22  0715 10/12/22  1141 09/30/22  0754 09/29/22  0744   WBC  --   --   --  5.9 7.3   HGB  10.7* 10.4* 13.1 10.1*  10.1* 11.4*   PLT  --   --   --  190 232     No lab results found.    Results for orders placed or performed during the hospital encounter of 12/15/22   XR Ankle Port Right G/E 3 Views    Narrative    RIGHT ANKLE PORTABLE THREE OR MORE VIEWS   12/15/2022 1:59 PM     HISTORY:  Status post ORIF.    COMPARISON: Radiographs from 9/29/2022.      Impression    IMPRESSION: Interval ORIF of the distal tibia and fibular fractures.  Excellent position and alignment. No complication evident. There are a  couple of tubular foreign bodies projecting over the anteromedial  aspect of the lower leg which are nonspecific and for which clinical  correlation is needed.    KHADAR PETER MD         SYSTEM ID:  [id number]

## 2022-12-18 NOTE — PLAN OF CARE
Goal Outcome Evaluation:      Plan of Care Reviewed With: patient    Overall Patient Progress: improvingOverall Patient Progress: improving         VSS. Afebrile. AO x4. Pt. Voiding. No BM today. Pt. On regular diet with 1500 ml fluid restriction.IVF via PICC SL. CAM boot on when OOB. Hemovac in place with ace wrap. Pt. Is up with stand by assist. Pt. Declines bed/chair alarm at this time. Pt. Is up with stand by assist and use of cane. Pain managed with [place] and atarax. Pt. Plan is to go home tomorrow via home health with long term abx and wound vac. Pt. Requested to leave before 1400 so she is able to get a ride home.

## 2022-12-18 NOTE — PROGRESS NOTES
Updated cultures reviewed continue the plan of ertapenem for 6 weeks     Roxy Leonardo MD  Infectious Disease

## 2022-12-19 VITALS
BODY MASS INDEX: 24.54 KG/M2 | SYSTOLIC BLOOD PRESSURE: 155 MMHG | RESPIRATION RATE: 20 BRPM | HEIGHT: 63 IN | TEMPERATURE: 97.3 F | DIASTOLIC BLOOD PRESSURE: 88 MMHG | OXYGEN SATURATION: 100 % | HEART RATE: 69 BPM | WEIGHT: 138.5 LBS

## 2022-12-19 PROCEDURE — 999N000040 HC STATISTIC CONSULT NO CHARGE VASC ACCESS

## 2022-12-19 PROCEDURE — 97606 NEG PRS WND THER DME>50 SQCM: CPT

## 2022-12-19 PROCEDURE — 250N000013 HC RX MED GY IP 250 OP 250 PS 637: Performed by: ORTHOPAEDIC SURGERY

## 2022-12-19 PROCEDURE — G0463 HOSPITAL OUTPT CLINIC VISIT: HCPCS | Mod: 25

## 2022-12-19 PROCEDURE — 250N000011 HC RX IP 250 OP 636: Performed by: INTERNAL MEDICINE

## 2022-12-19 PROCEDURE — 99239 HOSP IP/OBS DSCHRG MGMT >30: CPT | Performed by: INTERNAL MEDICINE

## 2022-12-19 RX ADMIN — ASPIRIN 325 MG: 325 TABLET, COATED ORAL at 07:45

## 2022-12-19 RX ADMIN — HYDROCODONE BITARTRATE AND ACETAMINOPHEN 2 TABLET: 5; 325 TABLET ORAL at 15:08

## 2022-12-19 RX ADMIN — FAMOTIDINE 20 MG: 20 TABLET ORAL at 07:45

## 2022-12-19 RX ADMIN — THERA TABS 1 TABLET: TAB at 07:45

## 2022-12-19 RX ADMIN — HYDROCODONE BITARTRATE AND ACETAMINOPHEN 2 TABLET: 5; 325 TABLET ORAL at 12:08

## 2022-12-19 RX ADMIN — HYDROCODONE BITARTRATE AND ACETAMINOPHEN 2 TABLET: 5; 325 TABLET ORAL at 07:44

## 2022-12-19 RX ADMIN — HYDROXYZINE HYDROCHLORIDE 25 MG: 25 TABLET, FILM COATED ORAL at 12:32

## 2022-12-19 RX ADMIN — ERTAPENEM SODIUM 1 G: 1 INJECTION, POWDER, LYOPHILIZED, FOR SOLUTION INTRAMUSCULAR; INTRAVENOUS at 08:22

## 2022-12-19 RX ADMIN — HYDROCODONE BITARTRATE AND ACETAMINOPHEN 2 TABLET: 5; 325 TABLET ORAL at 00:59

## 2022-12-19 RX ADMIN — Medication 125 MCG: at 07:44

## 2022-12-19 ASSESSMENT — ACTIVITIES OF DAILY LIVING (ADL)
ADLS_ACUITY_SCORE: 25

## 2022-12-19 NOTE — PLAN OF CARE
Goal Outcome Evaluation:      Plan of Care Reviewed With: patient    Overall Patient Progress: improvingOverall Patient Progress: improving    Patient vital signs are at baseline: Yes  Patient able to ambulate as they were prior to admission or with assist devices provided by therapies during their stay:  Yes, using boot and cane.  Patient MUST void prior to discharge:  Yes  Patient able to tolerate oral intake:  Yes  Pain has adequate pain control using Oral analgesics:  Yes  Does patient have an identified :  Yes, home care will be in place prior to discharge.  Has goal D/C date and time been discussed with patient:  Yes    A&Ox4, Vitals stable, pain controlled with atarax and Norco. CHG wipes done on PICC line placed yesterday. Independent in room. Wound vac in place. Likely discharging home tomorrow pending home care arrangements.

## 2022-12-19 NOTE — PROGRESS NOTES
Care Management Follow Up    Length of Stay (days): 4    Expected Discharge Date: 12/19/2022     Concerns to be Addressed: care coordination/care conferences, discharge planning     Patient plan of care discussed at interdisciplinary rounds: Yes    Anticipated Discharge Disposition: Home, Home Care, Home Infusion, DME     Anticipated Discharge Services: None  Anticipated Discharge DME: Wound Vac    Patient/family educated on Medicare website which has current facility and service quality ratings: no  Education Provided on the Discharge Plan:  Yes  Patient/Family in Agreement with the Plan: yes    Referrals Placed by CM/SW: Homecare, Home Infusion, Durable Medical Equipment (DME)  Private pay costs discussed: Not applicable    Additional Information:  Care management following for discharge planning.  Planning for discharge to home with wound vac and IV ABX.  */[facility] has approved Wound Vac for home. Inpatient CM will deliver home vac to patient bedside prior to discharge.  *Patient has chosen [facility] (I) for IV ABX upon discharge.   [facility] [phone] typically arranges for home care nursing services for wound vac dressing changes. Concern that patient would not meet home bound criteria for home care services.  *Upon discussion with [facility] today Monday 12/19 they are pursuing arranging for home care and payment source  * Potential for patient to go to outpatient clinic for wound vac dressing changes. Dr Gallardo has indicated that his clinic is unable to arrange or accommodate wound vac dressing changes. [facility] [phone] is unable to arrange or accommodate three times weekly appointments      *Will need confirmation of home care services for wound vac management prior to patient discharge. Home Infusion will not be able to start services until all plans in place for discharge, including wound vac dressing changes.   *If patient chooses to leave hospital without  arrangements in place, will be unable to leave with wound vac and will need alternative dressing plan       Gaby Bills, RN      Gaby Bills RN Case Manager  Inpatient Care Coordination  [facility]   [phone]

## 2022-12-19 NOTE — CONSULTS
[facility] Nurse Inpatient Assessment     Consulted for: Right shin    Patient History (according to provider note(s):      57F with hx of Right distal tibial ORIF c/b wound dehiscence, site infection, and skin necrosis presents for scheduled debridement of surgical wound with placement of wound vac. Cx polymicrobial.    Areas Assessed:      Areas visualized during today's visit: Focused:    Negative pressure wound therapy applied to: Right shin   Last photo: 12/19/22     Wound due to: Surgical Wound   Wound history/plan of care:  Patient with tibial fracture after tripping on her dog.  Patient developed a wound dehiscence, now s/p debridement on 12/15/22.    Surgical date: 12/19/22     Date Negative Pressure Wound Therapy initiated: 12/15/22     Interventions in place: elevation    Is patient s nutritional status compromised? no   a. If yes, what interventions are in place? N/A    Reason for initiating vac therapy? High risk of infections and Prior history of delayed wound healing    Which?of?the?following?co-morbidities?apply? N/A  a. If diabetic is patient on a diabetic management program? N/A     Is osteomyelitis present in wound? no  a.  If yes what treatments are in place? N/A    Wound base: 65 % granulation tissue, 35 % tendon      Palpation of the wound bed: normal       Drainage: moderate      Description of drainage: serosanguinous      Measurements (length x width x depth, in cm) 10  x 6  x  0.7 cm       Tunneling NA     Undermining up to 1.5 cm from 12 o'clock   Periwound skin: Macerated       Color: pink       Temperature: normal    Odor: none   Pain: moderate  Pain intervention prior to dressing change: oral    Treatment goal: Heal   STATUS: initial assessment   Supplies ordered: ordered vac dressing    Number of foam pieces removed from a wound (excluding foam for bridge) : 1 GranuFoam Black and 1 Oil emulsion dressing   Verified this matched the number of foam pieces  "applied last dressing change: Yes   Number of foam pieces packed into wound (excluding foam for bridge) : 1 GranuFoam Black and 1 Oil emulsion dressing         Treatment Plan:     Negative pressure wound therapy plan:  Wound location: Right shin   Change Days: Mon/Wed/Fri by WOC RN    Supplies (including all accessories) used: medium Black foam  and Adaptic/Curity oil emulsion contact layer   Cleanse with MicroKlenz prior to replacing VAC    Suction setting: -125   Methods used: Apply adaptic over exposed tendon before black foam    Staff RN to assess integrity of dressing and ensure suction is set at appropriate level every shift.   Date canister. Chart canister output every shift. Change cannister weekly and PRN if full/occluded     Remove foam dressing and replace with BID normal saline moist gauze dressing if:   -a dressing failure which cannot be repaired within 2 hours   -patient is discharging to home without a home pump   -patient is discharging to a facility outside the local area   -if a dressing is a \"Silver Foam\", remove before Radiation Therapy or MRI     The hospital VAC pump is not to be discharged with the patient.?Ensure to disconnect patient from machine prior to discharge. Then,    - If a home KCI VAC pump has been delivered, connect home cannister to dressing tubing then connect cannister to home pump and turn on machine    - If transferring to a nearby facility with a KCI vac, can disconnect and clamp tubing then cover end with glove so can be reconnected within 2 hours          Orders: Written    RECOMMEND PRIMARY TEAM ORDER: None, at this time  Education provided: plan of care  Discussed plan of care with: Patient and Nurse  WO nurse follow-up plan: Monday/WednesdayFriday  Notify WO if wound(s) deteriorate.  Nursing to notify the Provider(s) and re-consult the WOC Nurse if new skin concern.    DATA:     Current support surface: Standard  Standard gel/foam mattress (IsoFlex, Atmos air, " etc)  Containment of urine/stool: Continent of bladder and Continent of bowel  BMI: Body mass index is 24.53 kg/m .   Active diet order: Orders Placed This Encounter      Advance Diet as Tolerated: Regular Diet Adult      Diet     Output: I/O last 3 completed shifts:  In: 1480 [P.O.:1480]  Out: 30 [Drains:30]     Labs: Recent Labs   Lab 12/17/22  0641   HGB 10.7*     Pressure injury risk assessment:   Sensory Perception: 4-->no impairment  Moisture: 4-->rarely moist  Activity: 3-->walks occasionally  Mobility: 3-->slightly limited  Nutrition: 3-->adequate  Friction and Shear: 3-->no apparent problem  Bradley Score: 20    Milton Yousif RN CWOCN  Dept. Pager: [phone]  Dept. Office Number: [phone]

## 2022-12-19 NOTE — PROGRESS NOTES
Care Management Discharge Note    Discharge Date: 12/19/2022       Discharge Disposition: Home, Home Care, Home Infusion, DME    Discharge Services: None    Discharge DME: Wound Vac    Discharge Transportation:      Private pay costs discussed: Not applicable    Patient/family educated on Medicare website which has current facility and service quality ratings: no    Education Provided on the Discharge Plan:  Yes  Persons Notified of Discharge Plans: Patient, Nursing, Provider  Patient/Family in Agreement with the Plan: yes    Handoff Referral Completed: Yes    Additional Information:  Plan for discharge to home today.  [facility] [phone] has accepted for IV ABX and will complete teaching at the bedside this afternoon prior to patient discharge. Next IV ABX dose due 12/20/22. Contact information added to AVS.  Wound Vac has been approved through [employer] and home vac/ supplies delivered to patient bedside. Nurse will need to attach home vac prior to patient discharge. Contact information left at patient bedside and added to AVS.   [facility] will provide nursing visits for wound vac dressing changes, next due on Wednesday 12/21.         Gaby Bills, SANA Bills RN Case Manager  Inpatient Care Coordination  [facility]   [phone]

## 2022-12-19 NOTE — PROGRESS NOTES
[facility]    Pt will discharge to home today on IV ertapenem q24 [facility] is actively looking for home care for wound vac dressing changes, PICC site care, and labs. [facility] will be able to provide home RN until an agency is secured for home care nursing. I spoke with Leanne and she is in agreement with home nursing plan. I will do bedside IV teach today at 1300h.     Thank you for the referral.    Fiona Pham RN  [facility] Liaison  [phone] (Mon thru Fri 8am - 5pm)  [phone] Office

## 2022-12-19 NOTE — PROGRESS NOTES
"Orthopedic Surgery  12/19/2022    S: Patient voices no complaints today.     O: Blood pressure (!) 155/88, pulse 69, temperature 97.3  F (36.3  C), temperature source Temporal, resp. rate 20, height 1.6 m (5' 3\"), weight 62.8 kg (138 lb 8 oz), last menstrual period 06/20/2012, SpO2 100 %.  Lab Results   Component Value Date    HGB 10.7 12/17/2022    HGB 13.3 07/13/2010     No results found for: INR     Neurovascularly intact.  Calves are negative bilaterally, both soft and nontender.  The wound looks good with minimal erythema of the surrounding skin.   wound vac in place    A: Ms. Valentino is doing well status post Procedure(s):  Open irrigation and debridement of right distal tibia surgical wound with placement of vacuum-assisted wound closure device..    P: Continue local wound therapy. Vac in place, due to be changed today  Anticoagulation on ASA  ID: appreciate ID input.  Agree with home IV abx, PICC in place  Pain management  Discharge planning, awaiting arrangements on home wound vac and home abx    Raj Gallardo MD  [phone]    "

## 2022-12-19 NOTE — PROGRESS NOTES
Home Infusion  Leanne will be discharging today and going home on IV ertapenem  q24.  I met with Leanne at bedside and instructed in IV administration via SL PICC with SAS flushing.   Had Leanne perform hands on with practice equipment and teaching sheets. Provided information about supplies and supply delivery, storage of medication, checking of label, dosing times, plan for SNV and 24/7 availability of I staff. [facility] will follow for home RN until a home care agency can be secured.   Leanne verbalized understanding of information given. She demonstrated very good technique with practice and verbalized good understanding of process.  Stated she feels comfortable with administering abx in the home setting.   Dosing schedule: Pt will continue to dose around 0800 daily.   Delivery: medication and supplies will be delivered to pt's home today prior to first home dose.   Leanne is ready for discharge from [facility] perspective.    Fiona Pham RN  [facility] Liaison  [phone] (M-F 8a-5p) [phone] Office

## 2022-12-19 NOTE — DISCHARGE SUMMARY
"[facility]  Discharge Summary    Admit date:  12/15/2022    Discharge date and time: 12/19/22 1149    Discharge Physician: Arden Aguila MD    Primary care provider: Eb - S[facility]    Primary Discharge Diagnosis      Surgical Wound Dehiscence w/ concurrent infection and skin necrosis    Secondary Diagnoses     Excisional Debridement  Recent Right distal tibial ORIF  Hyponatremia  Tobacco Use Disorder    Summary of Hospital Stay     57F with hx of Right distal tibial ORIF c/b wound dehiscence, site infection, and skin necrosis presents for scheduled debridement of surgical wound with placement of wound vac. Cx polymicrobial. Patient will be discharged with home health antibiotics (Ertapenem x6w), wound vac, home health for dressing changes, and ortho/ID follow-up.    Patient Discharge Condition & Exam     Discharge condition: Improved    BP (!) 155/88 (BP Location: Left arm, Patient Position: Semi-Chappell's)   Pulse 69   Temp 97.3  F (36.3  C) (Temporal)   Resp 20   Ht 1.6 m (5' 3\")   Wt 62.8 kg (138 lb 8 oz)   LMP 06/20/2012   SpO2 100%   BMI 24.53 kg/m       General: In NAD.  Cardiac: RRR.  Lungs: CTAB.  Abd: Non-tender.  Ext: Right surgical wound with dressing in place c/d/i    Discharge Instructions     Patient/family instructions: Written discharge instruction given to patient/family    Discharge Medications:     Review of your medicines      START taking      Dose / Directions   ertapenem 1 GM vial  Commonly known as: INVanz  Indication: Bone and/or Joint Infection  Used for: Ankle wound, right, initial encounter      Dose: 1 g  Inject 1 g into the vein every 24 hours for 42 days Check AST, creat, WBC, sed rate and CRP weekly and fax results to Dr. Leonardo`s office.  Quantity: 420 mL  Refills: 0     hydrOXYzine 25 MG tablet  Commonly known as: ATARAX  Used for: Ankle wound, right, initial encounter      Dose: 25-50 mg  Take 1-2 tablets (25-50 mg) by mouth every 6 hours " as needed for other (muscle spasms)  Quantity: 60 tablet  Refills: 0        CONTINUE these medicines which may have CHANGED, or have new prescriptions. If we are uncertain of the size of tablets/capsules you have at home, strength may be listed as something that might have changed.      Dose / Directions   HYDROcodone-acetaminophen 5-325 MG tablet  Commonly known as: NORCO  This may have changed:     how much to take    when to take this    reasons to take this  Used for: Ankle wound, right, initial encounter      Dose: 1-2 tablet  Take 1-2 tablets by mouth every 4 hours as needed for severe pain (7-10)  Quantity: 40 tablet  Refills: 0        CONTINUE these medicines which have NOT CHANGED      Dose / Directions   acetaminophen 325 MG tablet  Commonly known as: TYLENOL  Used for: Tibia/fibula fracture, right, closed, initial encounter      Dose: 650 mg  Take 2 tablets (650 mg) by mouth every 6 hours as needed for other (For optimal non-opioid multimodal pain management to improve pain control.)  Quantity: 45 tablet  Refills: 0     COLLAGEN PO      Dose: 1 tablet  Take 1 tablet by mouth daily  Refills: 0     fish oil-omega-3 fatty acids 1000 MG capsule      Dose: 1 g  Take 1 g by mouth daily  Refills: 0     hypromellose 0.3 % Soln ophthalmic solution  Commonly known as: GENTEAL      Dose: 1 drop  Place 1 drop into both eyes every hour as needed for dry eyes  Refills: 0     multivitamin, therapeutic Tabs tablet      Dose: 1 tablet  Take 1 tablet by mouth daily  Refills: 0     Vitamin D3 125 MCG (5000 UT) tablet  Commonly known as: CHOLECALCIFEROL      Dose: 125 mcg  Take 125 mcg by mouth daily  Refills: 0        STOP taking    cephALEXin 500 MG capsule  Commonly known as: KEFLEX              Where to get your medicines      These medications were sent to [facility] [place], [place] - [address] [facility]  [address] [facility] [facility] [address]    Phone: [phone]      HYDROcodone-acetaminophen 5-325 MG tablet    hydrOXYzine 25 MG tablet        Discharge diet: Resume your previous diet    Discharge activity: Activity as tolerated    Discharge follow-up:    Follow up with primary care provider within one week or earlier if symptoms return or gets worse.    Follow up with consultant as instructed.    Pending Results     Unresulted Labs Ordered in the Past 30 Days of this Admission     Date and Time Order Name Status Description    12/15/2022 12:39 PM Tissue Aerobic Bacterial Culture Routine Preliminary     12/15/2022 12:39 PM Anaerobic Bacterial Culture Routine Preliminary     12/15/2022 12:36 PM Anaerobic Bacterial Culture Routine Preliminary     12/15/2022 12:34 PM Tissue Aerobic Bacterial Culture Routine Preliminary     12/15/2022 12:34 PM Anaerobic Bacterial Culture Routine Preliminary            Patient Allergies     Allergies   Allergen Reactions     Sumatriptan Other (See Comments)     Other reaction(s): Tachycardia  tachycardia       Sulfa Drugs Rash     Disposition   Disposition: Home with home health     I saw and evaluated the patient on day of discharge and  discharge instructions reviewed  and  all the patient's questions and concerns addressed. Over 30 minutes spent on discharge and coordination of discharge process for this patient.

## 2022-12-19 NOTE — PLAN OF CARE
"Care from 3027-3047    Blood pressure (!) 142/77, pulse 69, temperature 97.2  F (36.2  C), temperature source Temporal, resp. rate 21, height 1.6 m (5' 3\"), weight 62.8 kg (138 lb 8 oz), last menstrual period 06/20/2012, SpO2 97 %.    Admit Date: 12/15/22  Admitting Diagnosis: wound dehiscence, open ankle wound  Neuro: Alert and Oriented x4  Activity: are independent with walking boot  Telemetry Monitoring: No  Pain: complaining of 8/10 pain in their R foot.  Norco given for pain.  Labs / Tests:   GI: WNL  : WNL  Fluids: is Saline locked. PICC line  Diet: Regular  Discharge Disposition: Pending Home Care and wound care coordination.     Dressing is CDI. CMS intact.   "

## 2022-12-20 LAB
BACTERIA TISS BX CULT: ABNORMAL

## 2022-12-21 ENCOUNTER — PATIENT OUTREACH (OUTPATIENT)
Dept: CARE COORDINATION | Facility: CLINIC | Age: 57
End: 2022-12-21

## 2022-12-21 ENCOUNTER — LAB REQUISITION (OUTPATIENT)
Dept: LAB | Facility: CLINIC | Age: 57
End: 2022-12-21
Payer: COMMERCIAL

## 2022-12-21 DIAGNOSIS — S91.001A UNSPECIFIED OPEN WOUND, RIGHT ANKLE, INITIAL ENCOUNTER: ICD-10-CM

## 2022-12-21 LAB
AST SERPL W P-5'-P-CCNC: 15 U/L (ref 0–45)
BACTERIA TISS BX CULT: ABNORMAL
BASOPHILS # BLD AUTO: 0 10E3/UL (ref 0–0.2)
BASOPHILS NFR BLD AUTO: 1 %
BUN SERPL-MCNC: 6 MG/DL (ref 7–30)
CREAT SERPL-MCNC: 0.49 MG/DL (ref 0.52–1.04)
CRP SERPL-MCNC: 20.1 MG/L (ref 0–8)
EOSINOPHIL # BLD AUTO: 0.1 10E3/UL (ref 0–0.7)
EOSINOPHIL NFR BLD AUTO: 2 %
ERYTHROCYTE [DISTWIDTH] IN BLOOD BY AUTOMATED COUNT: 13.5 % (ref 10–15)
ERYTHROCYTE [SEDIMENTATION RATE] IN BLOOD BY WESTERGREN METHOD: 27 MM/HR (ref 0–30)
GFR SERPL CREATININE-BSD FRML MDRD: >90 ML/MIN/1.73M2
HCT VFR BLD AUTO: 36.1 % (ref 35–47)
HGB BLD-MCNC: 12 G/DL (ref 11.7–15.7)
IMM GRANULOCYTES # BLD: 0 10E3/UL
IMM GRANULOCYTES NFR BLD: 1 %
LYMPHOCYTES # BLD AUTO: 2.7 10E3/UL (ref 0.8–5.3)
LYMPHOCYTES NFR BLD AUTO: 33 %
MCH RBC QN AUTO: 32.1 PG (ref 26.5–33)
MCHC RBC AUTO-ENTMCNC: 33.2 G/DL (ref 31.5–36.5)
MCV RBC AUTO: 97 FL (ref 78–100)
MONOCYTES # BLD AUTO: 0.6 10E3/UL (ref 0–1.3)
MONOCYTES NFR BLD AUTO: 7 %
NEUTROPHILS # BLD AUTO: 4.8 10E3/UL (ref 1.6–8.3)
NEUTROPHILS NFR BLD AUTO: 56 %
NRBC # BLD AUTO: 0 10E3/UL
NRBC BLD AUTO-RTO: 0 /100
PLATELET # BLD AUTO: 481 10E3/UL (ref 150–450)
RBC # BLD AUTO: 3.74 10E6/UL (ref 3.8–5.2)
WBC # BLD AUTO: 8.3 10E3/UL (ref 4–11)

## 2022-12-21 PROCEDURE — 82565 ASSAY OF CREATININE: CPT | Performed by: ORTHOPAEDIC SURGERY

## 2022-12-21 PROCEDURE — 84520 ASSAY OF UREA NITROGEN: CPT | Performed by: ORTHOPAEDIC SURGERY

## 2022-12-21 PROCEDURE — 84450 TRANSFERASE (AST) (SGOT): CPT | Performed by: ORTHOPAEDIC SURGERY

## 2022-12-21 PROCEDURE — 86140 C-REACTIVE PROTEIN: CPT | Performed by: ORTHOPAEDIC SURGERY

## 2022-12-21 PROCEDURE — 85025 COMPLETE CBC W/AUTO DIFF WBC: CPT | Performed by: ORTHOPAEDIC SURGERY

## 2022-12-21 PROCEDURE — 85652 RBC SED RATE AUTOMATED: CPT | Performed by: ORTHOPAEDIC SURGERY

## 2022-12-21 NOTE — PROGRESS NOTES
"Clinic Care Coordination Contact  [facility]: Post-Discharge Note  SITUATION                                                      Admission:    Admission Date: 12/15/22   Reason for Admission: Surgical Wound Dehiscence w/ concurrent infection and skin necrosis  Discharge:   Discharge Date: 12/19/22  Discharge Diagnosis: Surgical Wound Dehiscence w/ concurrent infection and skin necrosis  Recent Right distal tibial ORIF  Hyponatremia  Tobacco Use Disorder    BACKGROUND                                                      Per hospital discharge summary and inpatient provider notes:    Summary of Hospital Stay      57F with hx of Right distal tibial ORIF c/b wound dehiscence, site infection, and skin necrosis presents for scheduled debridement of surgical wound with placement of wound vac. Cx polymicrobial. Patient will be discharged with home health antibiotics (Ertapenem x6w), wound vac, home health for dressing changes, and ortho/ID follow-up.      ASSESSMENT           Discharge Assessment  How are you doing now that you are home?: Pt states she's doing well, on her way to her consult appt with the plastic surgeon and has the home health nurse coming later today for SOC visit, nurse will be changing wound vac dressing.  Pain \"ok but I can't walk a lot as it hurts when walking; I'm trying to use my cane as much as possible when I do walk so I don't lose anymore strength in my leg than I already have.\"  States wound \"looks a lot better, not a lot of drainage, it's not swollen, and I can see new skin growth.\"  How are your symptoms? (Red Flag symptoms escalate to triage hotline per guidelines): Improved  Do you feel your condition is stable enough to be safe at home until your provider visit?: Yes  Does the patient have their discharge instructions? : Yes  Does the patient have questions regarding their discharge instructions? : No  Were you started on any new medications or were there changes to any of your " "previous medications? : Yes (Reviewed medications from [facility])  Does the patient have all of their medications?: Yes  Do you have questions regarding any of your medications? : No  Do you have all of your needed medical supplies or equipment (DME)?  (i.e. oxygen tank, CPAP, cane, etc.): Yes (wound care supplies, wound vac, IV ATBx)  Discharge follow-up appointment scheduled within 14 calendar days? : Yes  Discharge Follow Up Appointment Date: 12/21/22  Discharge Follow Up Appointment Scheduled with?: Specialty Care Provider (Plastic Surgeon consult today (12/21), and Ortho 1/3/23)         Post-op (Clinicians Only)  Did the patient have surgery or a procedure: Yes (s/p Open irrigation and debridement of right distal tibia surgical wound with placement of vacuum-assisted wound closure device on 12/15/22)  Incision: open (Wound VAC in place)  Drainage: Yes  Drainage Color:  (pt reports \"not a lot of drainage\" but unable to assess color of drainage as pt was on her way to an appt)  Incision Drainage Amount: other (\"not a lot of drainage.\")  Bleeding:  (unable to assess as pt on her way to an appt)  Fever: No  Chills: No  Redness: No  Warmth: No  Swelling: No  Incision site pain: No  Eating & Drinking: eating and drinking without complaints/concerns  PO Intake: regular diet  Bowel Function:  (unable to assess as pt on her way to an appt)  Urinary Status:  (unable to assess as pt on her way to an appt)      RN unable to discuss Clinic Care Coordination as patient was on her way to her plastic surgery consult appt.  Primary Care - Care Coordination Program referral created per standard work.    PLAN                                                      Outpatient Plan:      Follow up at [facility] 1 week following surgery for removal of retention suture. If you do not already have a follow up appointment scheduled, please call our [place] office: [phone].  No follow up labs or test are needed.    Discharge " follow-up:     Follow up with primary care provider within one week or earlier if symptoms return or gets worse.     Follow up with consultant as instructed.  Home Care Referral    Future Appointments   Date Time Provider Department Center   2/27/2023 11:30 AM Anju Jack DO [facility]         For any urgent concerns, please contact our 24 hour nurse triage line: [phone] ([phone])         Loida Marte RN

## 2022-12-22 ENCOUNTER — PATIENT OUTREACH (OUTPATIENT)
Dept: NURSING | Facility: CLINIC | Age: 57
End: 2022-12-22
Payer: COMMERCIAL

## 2022-12-22 LAB
BACTERIA TISS BX CULT: ABNORMAL

## 2022-12-22 ASSESSMENT — ACTIVITIES OF DAILY LIVING (ADL): DEPENDENT_IADLS:: TRANSPORTATION

## 2022-12-22 NOTE — PROGRESS NOTES
Clinic Care Coordination Contact    Clinic Care Coordination Contact  OUTREACH    Referral Information:  Referral Source: PCP    Primary Diagnosis: Other (include Comment box) (pen right ankle wound)    Chief Complaint   Patient presents with     Clinic Care Coordination - Initial      Universal Utilization:   [employer]  Clinic Utilization:  [facility]   Difficulty keeping appointments:: No  Compliance Concerns: No  No-Show Concerns: No  No PCP office visit in Past Year: No  Utilization    Hospital Admissions  2             ED Visits  1             No Show Count (past year)  0                Current as of: 12/22/2022  8:40 AM            Clinical Concerns:  Current Medical Concerns:  Recent discharge from hospital    Current Behavioral Concerns: None    Education Provided to patient:   RNCC called and spoke with patient; introduced self, discussed role of Care Coordination and explained reason for call     Pain  Pain (GOAL):: No (Presccribed pain medication alleviates right ankle wound pain)  Health Maintenance Reviewed: Due/Overdue   Health Maintenance Due   Topic Date Due     NICOTINE/TOBACCO CESSATION COUNSELING Q 1 YR  Never done     ADVANCE CARE PLANNING  Never done     DEPRESSION ACTION PLAN  Never done     HEPATITIS B IMMUNIZATION (1 of 3 - 3-dose series) Never done     Pneumococcal Vaccine: Pediatrics (0 to 5 Years) and At-Risk Patients (6 to 64 Years) (1 - PCV) Never done     COLORECTAL CANCER SCREENING  Never done     HIV SCREENING  Never done     HEPATITIS C SCREENING  Never done     LIPID  Never done     MAMMO SCREENING  08/25/2012     PAP  07/13/2013     YEARLY PREVENTIVE VISIT  07/16/2013     ZOSTER IMMUNIZATION (1 of 2) Never done     LUNG CANCER SCREENING  Never done     DTAP/TDAP/TD IMMUNIZATION (2 - Td or Tdap) 07/13/2020     INFLUENZA VACCINE (1) 09/01/2022     COVID-19 Vaccine (2 - Pfizer series) 11/02/2022     Clinical Pathway: None    Medication  Management:  Medication review status: Medications reviewed and no changes reported per patient.        Current Outpatient Medications   Medication     acetaminophen (TYLENOL) 325 MG tablet     COLLAGEN PO     ertapenem (INVANZ) 1 GM vial     fish oil-omega-3 fatty acids 1000 MG capsule     hydrOXYzine (ATARAX) 25 MG tablet     hypromellose (GENTEAL) 0.3 % SOLN ophthalmic solution     multivitamin, therapeutic (THERA-VIT) TABS tablet     Vitamin D3 (CHOLECALCIFEROL) 125 MCG (5000 UT) tablet     No current facility-administered medications for this visit.     Functional Status:  Dependent ADLs:: Independent  Dependent IADLs:: Transportation  Mobility Status: Independent w/Device    Living Situation:  Current living arrangement:: I live alone  Type of residence:: Private home - stairs    Lifestyle & Psychosocial Needs:    Social Determinants of Health     Tobacco Use: High Risk     Smoking Tobacco Use: Every Day     Smokeless Tobacco Use: Never     Passive Exposure: Not on file   Alcohol Use: Not on file   Financial Resource Strain: Not on file   Food Insecurity: Not on file   Transportation Needs: Not on file   Physical Activity: Not on file   Stress: Not on file   Social Connections: Not on file   Intimate Partner Violence: Not on file   Depression: Not at risk     PHQ-2 Score: 0   Housing Stability: Not on file     Diet:: Regular  Inadequate nutrition (GOAL):: No  Tube Feeding: No  Inadequate activity/exercise (GOAL):: No  Significant changes in sleep pattern (GOAL): No  Transportation means:: Friend, Family, Regular car (No driving instructions)     Worship or spiritual beliefs that impact treatment:: No  Mental health DX:: No  Mental health management concern (GOAL):: No  Chemical Dependency Status: No Current Concerns  Chemical Dependency Management:  (NA)      Resources and Interventions:  Current Resources:   Skilled Home Care Services: Skilled Nursing  Community Resources: Home Care, Home Infusion ([facility]-wound vac changes Mon/Weds/Fri)  Supplies Currently Used at Home: Wound Care Supplies, Other (PICC line; IV Ertapenem every 24 hours)  Equipment Currently Used at Home: cane, straight, raised toilet seat, walker, standard (Knee wheelchair)  Employment Status: employed full-time (On a leave of absense)    Advance Care Plan/Directive  Advanced Care Plans/Directives on file:: No  Type Advanced Care Plans/Directives:  (Full Code)  Advanced Care Plan/Directive Status: Considering Options    Referrals Placed: None    Care Plan: NA    Patient/Caregiver understanding: NA    Future Appointments              Today OX CCC RN [facility]    In 2 months Anju Jack DO [facility] MARLENY [facility]        Plan:   -Patient will contact the care team with questions, concerns, support needs   -Patient will use the clinic as a resource and understands (s)he can contact the [facility] with 24/7 after hours services available  -Care Coordinator will remain available as needed  -No further care coordination outreaches at this time     Fiona Lora RN, BSN, PHN  Primary Care / Care Coordinator   [facility]  E-mail [email or web address]   [phone]

## 2022-12-22 NOTE — LETTER
[facility] CARE COORDINATION  [address]  [place] [address]    December 22, 2022    Leanne Valentino  [address]  [place] [address]      Dear Leanne,        I am a clinic care coordinator who works with [facility] with the [facility]. I wanted to thank you for spending the time to talk with me.  Below is a description of clinic care coordination and how I can further assist you.       The clinic care coordination team is made up of a registered nurse, , financial resource worker and community health worker who understand the health care system. The goal of clinic care coordination is to help you manage your health and improve access to the health care system. Our team works alongside your provider to assist you in determining your health and social needs. We can help you obtain health care and community resources, providing you with necessary information and education. We can work with you through any barriers and develop a care plan that helps coordinate and strengthen the communication between you and your care team.    Please feel free to contact me with any questions or concerns regarding care coordination and what we can offer.      We are focused on providing you with the highest-quality healthcare experience possible.    Sincerely,      Fiona Lora RN, BSN, PHN  Primary Care / Care Coordinator   [facility]  E-mail [email or web address]   [phone]

## 2022-12-26 ENCOUNTER — LAB REQUISITION (OUTPATIENT)
Dept: LAB | Facility: CLINIC | Age: 57
End: 2022-12-26
Payer: COMMERCIAL

## 2022-12-26 DIAGNOSIS — S91.001A UNSPECIFIED OPEN WOUND, RIGHT ANKLE, INITIAL ENCOUNTER: ICD-10-CM

## 2022-12-26 LAB
AST SERPL W P-5'-P-CCNC: 20 U/L (ref 0–45)
BASOPHILS # BLD AUTO: 0 10E3/UL (ref 0–0.2)
BASOPHILS NFR BLD AUTO: 0 %
BUN SERPL-MCNC: 11 MG/DL (ref 7–30)
CREAT SERPL-MCNC: 0.54 MG/DL (ref 0.52–1.04)
CRP SERPL-MCNC: 6.2 MG/L (ref 0–8)
EOSINOPHIL # BLD AUTO: 0.1 10E3/UL (ref 0–0.7)
EOSINOPHIL NFR BLD AUTO: 1 %
ERYTHROCYTE [DISTWIDTH] IN BLOOD BY AUTOMATED COUNT: 13.5 % (ref 10–15)
ERYTHROCYTE [SEDIMENTATION RATE] IN BLOOD BY WESTERGREN METHOD: 35 MM/HR (ref 0–30)
GFR SERPL CREATININE-BSD FRML MDRD: >90 ML/MIN/1.73M2
HCT VFR BLD AUTO: 34.3 % (ref 35–47)
HGB BLD-MCNC: 12 G/DL (ref 11.7–15.7)
HOLD SPECIMEN: NORMAL
IMM GRANULOCYTES # BLD: 0 10E3/UL
IMM GRANULOCYTES NFR BLD: 0 %
LYMPHOCYTES # BLD AUTO: 2 10E3/UL (ref 0.8–5.3)
LYMPHOCYTES NFR BLD AUTO: 20 %
MCH RBC QN AUTO: 32.5 PG (ref 26.5–33)
MCHC RBC AUTO-ENTMCNC: 35 G/DL (ref 31.5–36.5)
MCV RBC AUTO: 93 FL (ref 78–100)
MONOCYTES # BLD AUTO: 0.7 10E3/UL (ref 0–1.3)
MONOCYTES NFR BLD AUTO: 7 %
NEUTROPHILS # BLD AUTO: 7.3 10E3/UL (ref 1.6–8.3)
NEUTROPHILS NFR BLD AUTO: 72 %
NRBC # BLD AUTO: 0 10E3/UL
NRBC BLD AUTO-RTO: 0 /100
PLATELET # BLD AUTO: 501 10E3/UL (ref 150–450)
RBC # BLD AUTO: 3.69 10E6/UL (ref 3.8–5.2)
WBC # BLD AUTO: 10 10E3/UL (ref 4–11)

## 2022-12-26 PROCEDURE — 84520 ASSAY OF UREA NITROGEN: CPT | Performed by: INTERNAL MEDICINE

## 2022-12-26 PROCEDURE — 85652 RBC SED RATE AUTOMATED: CPT | Performed by: INTERNAL MEDICINE

## 2022-12-26 PROCEDURE — 84450 TRANSFERASE (AST) (SGOT): CPT | Performed by: INTERNAL MEDICINE

## 2022-12-26 PROCEDURE — 82565 ASSAY OF CREATININE: CPT | Performed by: INTERNAL MEDICINE

## 2022-12-26 PROCEDURE — 85004 AUTOMATED DIFF WBC COUNT: CPT | Performed by: INTERNAL MEDICINE

## 2022-12-26 PROCEDURE — 86140 C-REACTIVE PROTEIN: CPT | Performed by: INTERNAL MEDICINE

## 2022-12-29 NOTE — PROGRESS NOTES
Post Discharge Note   12/29/22    Received a call from [facility] [phone] requesting clarification on agency providing wound vac dressing changes. Reference #[id number] Called [facility] and notified that [facility] is following, [phone].   Discharge orders faxed to [facility] Fax [phone]    Gaby Bills RN Case Manager  Inpatient Care Coordination  [facility]   [phone]

## 2023-01-02 ENCOUNTER — LAB REQUISITION (OUTPATIENT)
Dept: LAB | Facility: CLINIC | Age: 58
End: 2023-01-02
Payer: COMMERCIAL

## 2023-01-02 DIAGNOSIS — S91.001A UNSPECIFIED OPEN WOUND, RIGHT ANKLE, INITIAL ENCOUNTER: ICD-10-CM

## 2023-01-02 LAB
AST SERPL W P-5'-P-CCNC: 16 U/L (ref 0–45)
BASOPHILS # BLD AUTO: 0.1 10E3/UL (ref 0–0.2)
BASOPHILS NFR BLD AUTO: 1 %
BUN SERPL-MCNC: 14 MG/DL (ref 7–30)
CREAT SERPL-MCNC: 0.5 MG/DL (ref 0.52–1.04)
CRP SERPL-MCNC: 2.9 MG/L (ref 0–8)
EOSINOPHIL # BLD AUTO: 0.1 10E3/UL (ref 0–0.7)
EOSINOPHIL NFR BLD AUTO: 1 %
ERYTHROCYTE [DISTWIDTH] IN BLOOD BY AUTOMATED COUNT: 13.8 % (ref 10–15)
GFR SERPL CREATININE-BSD FRML MDRD: >90 ML/MIN/1.73M2
HCT VFR BLD AUTO: 37.5 % (ref 35–47)
HGB BLD-MCNC: 12.4 G/DL (ref 11.7–15.7)
IMM GRANULOCYTES # BLD: 0 10E3/UL
IMM GRANULOCYTES NFR BLD: 0 %
LYMPHOCYTES # BLD AUTO: 2.2 10E3/UL (ref 0.8–5.3)
LYMPHOCYTES NFR BLD AUTO: 26 %
MCH RBC QN AUTO: 31.9 PG (ref 26.5–33)
MCHC RBC AUTO-ENTMCNC: 33.1 G/DL (ref 31.5–36.5)
MCV RBC AUTO: 96 FL (ref 78–100)
MONOCYTES # BLD AUTO: 0.5 10E3/UL (ref 0–1.3)
MONOCYTES NFR BLD AUTO: 6 %
NEUTROPHILS # BLD AUTO: 5.6 10E3/UL (ref 1.6–8.3)
NEUTROPHILS NFR BLD AUTO: 66 %
NRBC # BLD AUTO: 0 10E3/UL
NRBC BLD AUTO-RTO: 0 /100
PLATELET # BLD AUTO: 476 10E3/UL (ref 150–450)
RBC # BLD AUTO: 3.89 10E6/UL (ref 3.8–5.2)
WBC # BLD AUTO: 8.4 10E3/UL (ref 4–11)

## 2023-01-02 PROCEDURE — 85004 AUTOMATED DIFF WBC COUNT: CPT | Performed by: INTERNAL MEDICINE

## 2023-01-02 PROCEDURE — 84450 TRANSFERASE (AST) (SGOT): CPT | Performed by: INTERNAL MEDICINE

## 2023-01-02 PROCEDURE — 82565 ASSAY OF CREATININE: CPT | Performed by: INTERNAL MEDICINE

## 2023-01-02 PROCEDURE — 86140 C-REACTIVE PROTEIN: CPT | Performed by: INTERNAL MEDICINE

## 2023-01-02 PROCEDURE — 84520 ASSAY OF UREA NITROGEN: CPT | Performed by: INTERNAL MEDICINE

## 2023-01-06 ENCOUNTER — LAB REQUISITION (OUTPATIENT)
Dept: LAB | Facility: CLINIC | Age: 58
End: 2023-01-06
Payer: COMMERCIAL

## 2023-01-06 DIAGNOSIS — S91.001A UNSPECIFIED OPEN WOUND, RIGHT ANKLE, INITIAL ENCOUNTER: ICD-10-CM

## 2023-01-06 LAB — ERYTHROCYTE [SEDIMENTATION RATE] IN BLOOD BY WESTERGREN METHOD: 20 MM/HR (ref 0–30)

## 2023-01-06 PROCEDURE — 85652 RBC SED RATE AUTOMATED: CPT | Performed by: INTERNAL MEDICINE

## 2023-01-09 ENCOUNTER — LAB REQUISITION (OUTPATIENT)
Dept: LAB | Facility: CLINIC | Age: 58
End: 2023-01-09
Payer: COMMERCIAL

## 2023-01-09 DIAGNOSIS — S91.001A UNSPECIFIED OPEN WOUND, RIGHT ANKLE, INITIAL ENCOUNTER: ICD-10-CM

## 2023-01-09 LAB
AST SERPL W P-5'-P-CCNC: 14 U/L (ref 0–45)
BASOPHILS # BLD AUTO: 0 10E3/UL (ref 0–0.2)
BASOPHILS NFR BLD AUTO: 1 %
BUN SERPL-MCNC: 12 MG/DL (ref 7–30)
CREAT SERPL-MCNC: 0.53 MG/DL (ref 0.52–1.04)
CRP SERPL-MCNC: <2.9 MG/L (ref 0–8)
EOSINOPHIL # BLD AUTO: 0.1 10E3/UL (ref 0–0.7)
EOSINOPHIL NFR BLD AUTO: 1 %
ERYTHROCYTE [DISTWIDTH] IN BLOOD BY AUTOMATED COUNT: 13.7 % (ref 10–15)
ERYTHROCYTE [SEDIMENTATION RATE] IN BLOOD BY WESTERGREN METHOD: 8 MM/HR (ref 0–30)
GFR SERPL CREATININE-BSD FRML MDRD: >90 ML/MIN/1.73M2
HCT VFR BLD AUTO: 36 % (ref 35–47)
HGB BLD-MCNC: 12.1 G/DL (ref 11.7–15.7)
HOLD SPECIMEN: NORMAL
IMM GRANULOCYTES # BLD: 0 10E3/UL
IMM GRANULOCYTES NFR BLD: 0 %
LYMPHOCYTES # BLD AUTO: 2 10E3/UL (ref 0.8–5.3)
LYMPHOCYTES NFR BLD AUTO: 28 %
MCH RBC QN AUTO: 32.4 PG (ref 26.5–33)
MCHC RBC AUTO-ENTMCNC: 33.6 G/DL (ref 31.5–36.5)
MCV RBC AUTO: 96 FL (ref 78–100)
MONOCYTES # BLD AUTO: 0.8 10E3/UL (ref 0–1.3)
MONOCYTES NFR BLD AUTO: 12 %
NEUTROPHILS # BLD AUTO: 4.3 10E3/UL (ref 1.6–8.3)
NEUTROPHILS NFR BLD AUTO: 58 %
NRBC # BLD AUTO: 0 10E3/UL
NRBC BLD AUTO-RTO: 0 /100
PLATELET # BLD AUTO: 333 10E3/UL (ref 150–450)
RBC # BLD AUTO: 3.74 10E6/UL (ref 3.8–5.2)
WBC # BLD AUTO: 7.3 10E3/UL (ref 4–11)

## 2023-01-09 PROCEDURE — 84520 ASSAY OF UREA NITROGEN: CPT | Performed by: INTERNAL MEDICINE

## 2023-01-09 PROCEDURE — 85652 RBC SED RATE AUTOMATED: CPT | Performed by: INTERNAL MEDICINE

## 2023-01-09 PROCEDURE — 84450 TRANSFERASE (AST) (SGOT): CPT | Performed by: INTERNAL MEDICINE

## 2023-01-09 PROCEDURE — 86140 C-REACTIVE PROTEIN: CPT | Performed by: INTERNAL MEDICINE

## 2023-01-09 PROCEDURE — 85025 COMPLETE CBC W/AUTO DIFF WBC: CPT | Performed by: INTERNAL MEDICINE

## 2023-01-09 PROCEDURE — 82565 ASSAY OF CREATININE: CPT | Performed by: INTERNAL MEDICINE

## 2023-01-16 ENCOUNTER — LAB REQUISITION (OUTPATIENT)
Dept: LAB | Facility: CLINIC | Age: 58
End: 2023-01-16
Payer: COMMERCIAL

## 2023-01-16 DIAGNOSIS — S91.001A UNSPECIFIED OPEN WOUND, RIGHT ANKLE, INITIAL ENCOUNTER: ICD-10-CM

## 2023-01-16 LAB
AST SERPL W P-5'-P-CCNC: 13 U/L (ref 0–45)
BASOPHILS # BLD AUTO: 0 10E3/UL (ref 0–0.2)
BASOPHILS NFR BLD AUTO: 1 %
BUN SERPL-MCNC: 15 MG/DL (ref 7–30)
CREAT SERPL-MCNC: 0.48 MG/DL (ref 0.52–1.04)
CRP SERPL-MCNC: <2.9 MG/L (ref 0–8)
EOSINOPHIL # BLD AUTO: 0.1 10E3/UL (ref 0–0.7)
EOSINOPHIL NFR BLD AUTO: 1 %
ERYTHROCYTE [DISTWIDTH] IN BLOOD BY AUTOMATED COUNT: 13.7 % (ref 10–15)
ERYTHROCYTE [SEDIMENTATION RATE] IN BLOOD BY WESTERGREN METHOD: 5 MM/HR (ref 0–30)
GFR SERPL CREATININE-BSD FRML MDRD: >90 ML/MIN/1.73M2
HCT VFR BLD AUTO: 38.1 % (ref 35–47)
HGB BLD-MCNC: 12.7 G/DL (ref 11.7–15.7)
IMM GRANULOCYTES # BLD: 0 10E3/UL
IMM GRANULOCYTES NFR BLD: 0 %
LYMPHOCYTES # BLD AUTO: 1.7 10E3/UL (ref 0.8–5.3)
LYMPHOCYTES NFR BLD AUTO: 29 %
MCH RBC QN AUTO: 32.1 PG (ref 26.5–33)
MCHC RBC AUTO-ENTMCNC: 33.3 G/DL (ref 31.5–36.5)
MCV RBC AUTO: 96 FL (ref 78–100)
MONOCYTES # BLD AUTO: 0.6 10E3/UL (ref 0–1.3)
MONOCYTES NFR BLD AUTO: 11 %
NEUTROPHILS # BLD AUTO: 3.4 10E3/UL (ref 1.6–8.3)
NEUTROPHILS NFR BLD AUTO: 58 %
NRBC # BLD AUTO: 0 10E3/UL
NRBC BLD AUTO-RTO: 0 /100
PLATELET # BLD AUTO: 329 10E3/UL (ref 150–450)
RBC # BLD AUTO: 3.96 10E6/UL (ref 3.8–5.2)
WBC # BLD AUTO: 5.8 10E3/UL (ref 4–11)

## 2023-01-16 PROCEDURE — 82565 ASSAY OF CREATININE: CPT | Performed by: INTERNAL MEDICINE

## 2023-01-16 PROCEDURE — 85652 RBC SED RATE AUTOMATED: CPT | Performed by: INTERNAL MEDICINE

## 2023-01-16 PROCEDURE — 85004 AUTOMATED DIFF WBC COUNT: CPT | Performed by: INTERNAL MEDICINE

## 2023-01-16 PROCEDURE — 84450 TRANSFERASE (AST) (SGOT): CPT | Performed by: INTERNAL MEDICINE

## 2023-01-16 PROCEDURE — 86140 C-REACTIVE PROTEIN: CPT | Performed by: INTERNAL MEDICINE

## 2023-01-16 PROCEDURE — 84520 ASSAY OF UREA NITROGEN: CPT | Performed by: INTERNAL MEDICINE

## 2023-01-23 ENCOUNTER — LAB REQUISITION (OUTPATIENT)
Dept: LAB | Facility: CLINIC | Age: 58
End: 2023-01-23
Payer: COMMERCIAL

## 2023-01-23 LAB
AST SERPL W P-5'-P-CCNC: 25 U/L (ref 10–35)
BASOPHILS # BLD AUTO: 0 10E3/UL (ref 0–0.2)
BASOPHILS NFR BLD AUTO: 1 %
BUN SERPL-MCNC: 17.5 MG/DL (ref 6–20)
CREAT SERPL-MCNC: 0.72 MG/DL (ref 0.51–0.95)
CRP SERPL-MCNC: 3.2 MG/L
EOSINOPHIL # BLD AUTO: 0.1 10E3/UL (ref 0–0.7)
EOSINOPHIL NFR BLD AUTO: 2 %
ERYTHROCYTE [DISTWIDTH] IN BLOOD BY AUTOMATED COUNT: 13.2 % (ref 10–15)
ERYTHROCYTE [SEDIMENTATION RATE] IN BLOOD BY WESTERGREN METHOD: 10 MM/HR (ref 0–30)
GFR SERPL CREATININE-BSD FRML MDRD: >90 ML/MIN/1.73M2
HCT VFR BLD AUTO: 37.3 % (ref 35–47)
HGB BLD-MCNC: 12.8 G/DL (ref 11.7–15.7)
HOLD SPECIMEN: NORMAL
IMM GRANULOCYTES # BLD: 0 10E3/UL
IMM GRANULOCYTES NFR BLD: 0 %
LYMPHOCYTES # BLD AUTO: 1.6 10E3/UL (ref 0.8–5.3)
LYMPHOCYTES NFR BLD AUTO: 30 %
MCH RBC QN AUTO: 32.4 PG (ref 26.5–33)
MCHC RBC AUTO-ENTMCNC: 34.3 G/DL (ref 31.5–36.5)
MCV RBC AUTO: 94 FL (ref 78–100)
MONOCYTES # BLD AUTO: 0.6 10E3/UL (ref 0–1.3)
MONOCYTES NFR BLD AUTO: 12 %
NEUTROPHILS # BLD AUTO: 3 10E3/UL (ref 1.6–8.3)
NEUTROPHILS NFR BLD AUTO: 55 %
NRBC # BLD AUTO: 0 10E3/UL
NRBC BLD AUTO-RTO: 0 /100
PLATELET # BLD AUTO: 347 10E3/UL (ref 150–450)
RBC # BLD AUTO: 3.95 10E6/UL (ref 3.8–5.2)
WBC # BLD AUTO: 5.4 10E3/UL (ref 4–11)

## 2023-01-23 PROCEDURE — 82565 ASSAY OF CREATININE: CPT | Performed by: INTERNAL MEDICINE

## 2023-01-23 PROCEDURE — 85025 COMPLETE CBC W/AUTO DIFF WBC: CPT | Performed by: INTERNAL MEDICINE

## 2023-01-23 PROCEDURE — 84520 ASSAY OF UREA NITROGEN: CPT | Performed by: INTERNAL MEDICINE

## 2023-01-23 PROCEDURE — 86140 C-REACTIVE PROTEIN: CPT | Performed by: INTERNAL MEDICINE

## 2023-01-23 PROCEDURE — 85652 RBC SED RATE AUTOMATED: CPT | Performed by: INTERNAL MEDICINE

## 2023-01-23 PROCEDURE — 84450 TRANSFERASE (AST) (SGOT): CPT | Performed by: INTERNAL MEDICINE

## 2023-03-10 ENCOUNTER — OFFICE VISIT (OUTPATIENT)
Dept: FAMILY MEDICINE | Facility: CLINIC | Age: 58
End: 2023-03-10
Payer: COMMERCIAL

## 2023-03-10 VITALS
HEART RATE: 79 BPM | RESPIRATION RATE: 16 BRPM | DIASTOLIC BLOOD PRESSURE: 70 MMHG | WEIGHT: 136.9 LBS | HEIGHT: 63 IN | OXYGEN SATURATION: 100 % | TEMPERATURE: 98.3 F | SYSTOLIC BLOOD PRESSURE: 134 MMHG | BODY MASS INDEX: 24.26 KG/M2

## 2023-03-10 DIAGNOSIS — Z87.81 S/P ORIF (OPEN REDUCTION INTERNAL FIXATION) FRACTURE: ICD-10-CM

## 2023-03-10 DIAGNOSIS — F41.9 ANXIETY: ICD-10-CM

## 2023-03-10 DIAGNOSIS — F17.200 TOBACCO USE DISORDER: ICD-10-CM

## 2023-03-10 DIAGNOSIS — Z79.899 MEDICATION MANAGEMENT: ICD-10-CM

## 2023-03-10 DIAGNOSIS — Z13.0 SCREENING FOR DEFICIENCY ANEMIA: ICD-10-CM

## 2023-03-10 DIAGNOSIS — R03.0 ELEVATED BP WITHOUT DIAGNOSIS OF HYPERTENSION: ICD-10-CM

## 2023-03-10 DIAGNOSIS — F33.0 MILD RECURRENT MAJOR DEPRESSION (H): ICD-10-CM

## 2023-03-10 DIAGNOSIS — Z13.220 SCREENING FOR HYPERLIPIDEMIA: ICD-10-CM

## 2023-03-10 DIAGNOSIS — S82.891G ANKLE FRACTURE, RIGHT, CLOSED, WITH DELAYED HEALING, SUBSEQUENT ENCOUNTER: ICD-10-CM

## 2023-03-10 DIAGNOSIS — Z01.818 PREOP GENERAL PHYSICAL EXAM: Primary | ICD-10-CM

## 2023-03-10 DIAGNOSIS — Z98.890 S/P ORIF (OPEN REDUCTION INTERNAL FIXATION) FRACTURE: ICD-10-CM

## 2023-03-10 DIAGNOSIS — Z12.11 SCREEN FOR COLON CANCER: ICD-10-CM

## 2023-03-10 DIAGNOSIS — Z86.018 HISTORY OF UTERINE FIBROID: ICD-10-CM

## 2023-03-10 DIAGNOSIS — Z13.29 SCREENING FOR THYROID DISORDER: ICD-10-CM

## 2023-03-10 DIAGNOSIS — Z91.81 PERSONAL HISTORY OF FALL: ICD-10-CM

## 2023-03-10 DIAGNOSIS — E55.9 VITAMIN D INSUFFICIENCY: ICD-10-CM

## 2023-03-10 DIAGNOSIS — Z11.59 NEED FOR HEPATITIS C SCREENING TEST: ICD-10-CM

## 2023-03-10 DIAGNOSIS — Z11.4 SCREENING FOR HIV (HUMAN IMMUNODEFICIENCY VIRUS): ICD-10-CM

## 2023-03-10 DIAGNOSIS — Z23 NEED FOR VACCINATION: ICD-10-CM

## 2023-03-10 LAB
ALBUMIN SERPL BCG-MCNC: 4.4 G/DL (ref 3.5–5.2)
ALP SERPL-CCNC: 92 U/L (ref 35–104)
ALT SERPL W P-5'-P-CCNC: 8 U/L (ref 10–35)
ANION GAP SERPL CALCULATED.3IONS-SCNC: 12 MMOL/L (ref 7–15)
AST SERPL W P-5'-P-CCNC: 17 U/L (ref 10–35)
BILIRUB SERPL-MCNC: 0.4 MG/DL
BUN SERPL-MCNC: 13.7 MG/DL (ref 6–20)
CALCIUM SERPL-MCNC: 9.6 MG/DL (ref 8.6–10)
CHLORIDE SERPL-SCNC: 103 MMOL/L (ref 98–107)
CHOLEST SERPL-MCNC: 172 MG/DL
CREAT SERPL-MCNC: 0.53 MG/DL (ref 0.51–0.95)
DEPRECATED HCO3 PLAS-SCNC: 21 MMOL/L (ref 22–29)
ERYTHROCYTE [DISTWIDTH] IN BLOOD BY AUTOMATED COUNT: 13.4 % (ref 10–15)
FERRITIN SERPL-MCNC: 98 NG/ML (ref 11–328)
GFR SERPL CREATININE-BSD FRML MDRD: >90 ML/MIN/1.73M2
GLUCOSE SERPL-MCNC: 97 MG/DL (ref 70–99)
HCT VFR BLD AUTO: 38.1 % (ref 35–47)
HCV AB SERPL QL IA: NONREACTIVE
HDLC SERPL-MCNC: 92 MG/DL
HGB BLD-MCNC: 12.4 G/DL (ref 11.7–15.7)
HIV 1+2 AB+HIV1 P24 AG SERPL QL IA: NONREACTIVE
LDLC SERPL CALC-MCNC: 69 MG/DL
MCH RBC QN AUTO: 32 PG (ref 26.5–33)
MCHC RBC AUTO-ENTMCNC: 32.5 G/DL (ref 31.5–36.5)
MCV RBC AUTO: 98 FL (ref 78–100)
NONHDLC SERPL-MCNC: 80 MG/DL
PLATELET # BLD AUTO: 284 10E3/UL (ref 150–450)
POTASSIUM SERPL-SCNC: 3.7 MMOL/L (ref 3.4–5.3)
PROT SERPL-MCNC: 7.5 G/DL (ref 6.4–8.3)
RBC # BLD AUTO: 3.87 10E6/UL (ref 3.8–5.2)
SODIUM SERPL-SCNC: 136 MMOL/L (ref 136–145)
TRIGL SERPL-MCNC: 54 MG/DL
TSH SERPL DL<=0.005 MIU/L-ACNC: 1.09 UIU/ML (ref 0.3–4.2)
WBC # BLD AUTO: 9.1 10E3/UL (ref 4–11)

## 2023-03-10 PROCEDURE — 90677 PCV20 VACCINE IM: CPT | Performed by: INTERNAL MEDICINE

## 2023-03-10 PROCEDURE — 90715 TDAP VACCINE 7 YRS/> IM: CPT | Performed by: INTERNAL MEDICINE

## 2023-03-10 PROCEDURE — 36415 COLL VENOUS BLD VENIPUNCTURE: CPT | Performed by: INTERNAL MEDICINE

## 2023-03-10 PROCEDURE — 82306 VITAMIN D 25 HYDROXY: CPT | Performed by: INTERNAL MEDICINE

## 2023-03-10 PROCEDURE — 90472 IMMUNIZATION ADMIN EACH ADD: CPT | Performed by: INTERNAL MEDICINE

## 2023-03-10 PROCEDURE — 93000 ELECTROCARDIOGRAM COMPLETE: CPT | Performed by: INTERNAL MEDICINE

## 2023-03-10 PROCEDURE — 82728 ASSAY OF FERRITIN: CPT | Performed by: INTERNAL MEDICINE

## 2023-03-10 PROCEDURE — 86803 HEPATITIS C AB TEST: CPT | Performed by: INTERNAL MEDICINE

## 2023-03-10 PROCEDURE — 85027 COMPLETE CBC AUTOMATED: CPT | Performed by: INTERNAL MEDICINE

## 2023-03-10 PROCEDURE — 90471 IMMUNIZATION ADMIN: CPT | Performed by: INTERNAL MEDICINE

## 2023-03-10 PROCEDURE — 99214 OFFICE O/P EST MOD 30 MIN: CPT | Mod: 25 | Performed by: INTERNAL MEDICINE

## 2023-03-10 PROCEDURE — 80053 COMPREHEN METABOLIC PANEL: CPT | Performed by: INTERNAL MEDICINE

## 2023-03-10 PROCEDURE — 87389 HIV-1 AG W/HIV-1&-2 AB AG IA: CPT | Performed by: INTERNAL MEDICINE

## 2023-03-10 PROCEDURE — 80061 LIPID PANEL: CPT | Performed by: INTERNAL MEDICINE

## 2023-03-10 PROCEDURE — 84443 ASSAY THYROID STIM HORMONE: CPT | Performed by: INTERNAL MEDICINE

## 2023-03-10 RX ORDER — HYDROXYZINE HYDROCHLORIDE 25 MG/1
25 TABLET, FILM COATED ORAL 2 TIMES DAILY PRN
Qty: 20 TABLET | Refills: 1 | Status: SHIPPED | OUTPATIENT
Start: 2023-03-10 | End: 2024-04-01

## 2023-03-10 RX ORDER — LEVOFLOXACIN 500 MG/1
TABLET, FILM COATED ORAL
COMMUNITY
Start: 2023-01-25 | End: 2023-07-03

## 2023-03-10 ASSESSMENT — PAIN SCALES - GENERAL: PAINLEVEL: NO PAIN (0)

## 2023-03-10 NOTE — NURSING NOTE
Prior to immunization administration, verified patients identity using patient s name and date of birth. Please see Immunization Activity for additional information.     Screening Questionnaire for Adult Immunization    Are you sick today?   No   Do you have allergies to medications, food, a vaccine component or latex?   Yes   Have you ever had a serious reaction after receiving a vaccination?   No   Do you have a long-term health problem with heart, lung, kidney, or metabolic disease (e.g., diabetes), asthma, a blood disorder, no spleen, complement component deficiency, a cochlear implant, or a spinal fluid leak?  Are you on long-term aspirin therapy?   No   Do you have cancer, leukemia, HIV/AIDS, or any other immune system problem?   No   Do you have a parent, brother, or sister with an immune system problem?   No   In the past 3 months, have you taken medications that affect  your immune system, such as prednisone, other steroids, or anticancer drugs; drugs for the treatment of rheumatoid arthritis, Crohn s disease, or psoriasis; or have you had radiation treatments?   No   Have you had a seizure, or a brain or other nervous system problem?   No   During the past year, have you received a transfusion of blood or blood    products, or been given immune (gamma) globulin or antiviral drug?   No   For women: Are you pregnant or is there a chance you could become       pregnant during the next month?   No   Have you received any vaccinations in the past 4 weeks?   No     Immunization questionnaire was positive for at least one answer.  Notified .        Per orders of Dr. Martinez, injection of TDAP and PCV20 given by Elizabeth Gates MA. Patient instructed to remain in clinic for 15 minutes afterwards, and to report any adverse reaction to me immediately.       Screening performed by Elizabeth Gates MA on 3/10/2023 at 8:34 AM.

## 2023-03-10 NOTE — PROGRESS NOTES
[facility]  [address], [address]  [place] [address]  Phone: [phone]  Primary Provider: Eb - [facility]  Pre-op Performing Provider: VANESSA VALERIO      PREOPERATIVE EVALUATION:  Today's date: 3/10/2023    Leanne Valentino is a 58 year old female who presents for a preoperative evaluation.    Surgical Information:  Surgery/Procedure: Right ankle hardware removal  Surgery Location:  OR  Surgeon: Raj Gallardo MD  Surgery Date: 3/13/23  Time of Surgery: 7:20AM  Where patient plans to recover: At home with family  Fax number for surgical facility: Note does not need to be faxed, will be available electronically in Epic.    Type of Anesthesia Anticipated: General    Leanne was seen today for pre-op exam.    Diagnoses and all orders for this visit:    Preop general physical exam  -     CBC with platelets; Future  -     EKG 12-lead complete w/read - Clinics    Instructed the patient to avoid  aspirin, fish oils, multivitamins and herbal supplements and NSAIDs such as ibuprofen, motrin or aleve 7 days before the surgery. She can resume them after the surgery.     She will continue taking vitamin D3 and tylenol is okay to take for pain.     No history of any anesthesia complications   No family history of any anesthesia complications.    S/P ORIF (open reduction internal fixation) fracture  Comments:  right ankle, reviewed orthopedic note     Ankle fracture, right, closed, with delayed healing, subsequent encounter   On 09/29/2022,  she fell and had a right distal tibia fracture that was treated with ORIF. At that time, fracture was healing nicely but she developed an infection around her wound and was treated with antibiotics. However, she, later, noticed increased redness, drainage and wound breakdown. On 12/15/2022, she underwent open irrigation and debridement with obtaining cultures and then placement of a vacuum-assisted wound closure device.   She has an upcoming right ankle hardware removal surgery by Dr. Gallardo on 03/13/2023.    She is still on levaquin 500 mg     Personal history of fall   See above     History of uterine fibroid   S/p Laparoscopic myomectomy     Mild recurrent major depression (H)   She is not taking any antidepressants. Vitamin D3 helps improving her mood per patient report .     Tobacco use disorder  -     nicotine (NICORETTE) 2 MG gum; Place 1 each (2 mg) inside cheek every hour as needed for smoking cessation    She reports current use of cigars.     Educated her that smoking can slow down the wound healing and also provided information about nicotine options such as gums and patches. Patient verbalized understanding and agreed to start on nicotine gums. I recommended her to start as soon as she can, preferably today.  Discussed that the smoking affects the healing of the wound so it is very important she quits now    Medication management  -     Comprehensive metabolic panel; Future    Screening for HIV (human immunodeficiency virus)  -     HIV Antigen Antibody Combo; Future    Need for hepatitis C screening test  -     Hepatitis C Screen Reflex to HCV RNA Quant and Genotype; Future    Screening for hyperlipidemia  -     Lipid panel reflex to direct LDL Fasting; Future    Screening for thyroid disorder  -     TSH with free T4 reflex; Future    Screening for deficiency anemia  -     Ferritin; Future    Screen for colon cancer  -     Fecal colorectal cancer screen (FIT); Future    Anxiety  -     hydrOXYzine (ATARAX) 25 MG tablet; Take 1 tablet (25 mg) by mouth 2 times daily as needed for anxiety    She reports developing some anxiety with upcoming surgeries and finance for her procedures. I assured her and provided information about hydroxyzine to help relieve her.  Patient agrees to the medication treatment     Other   Today blood pressure elevated at 147/89. Rechecked blood pressure at 134/70. Home health nurse visits patient's  home and monitors her blood pressure - the values were reportedly around 120/80   I encouraged patient to establish a PCP and schedule physical routine at her earliest.    She states that she is up to date with COVID-19 vaccines and will bring the card later to update the information into the symptom   She understands that mammogram is overdue. Patient will make an appointment at her earliest convenience.    Patient receives both Tdap and pneumococcal vaccine.    Recommended patient to get shingles at local drug store after her procedure    Patient is optimal for above procedure.    Avoid aspirin 7 days before the surgery. Avoid nonsteroidal anti-inflammatory pain medication like ibuprofen, Motrin, or Aleve 7 days before the surgery.  Tylenol is okay to use for pain.  Avoid any OTC multivitamins or herbal supplement 7 days before surgery   Resume after surgery    Preventive health counseling was also done.  Advise to schedule physical as she is due for mammogram , pap test and colonoscopy  She is going to bring record of covid shots     Subjective     HPI related to upcoming procedure: Leanne is 58 year old, presenting here for pre op evaluation. On 09/29/2022,  she fell and had a right distal tibia fracture that was treated with ORIF. At that time, fracture was healing nicely but she developed an infection around her wound and was treated with antibiotics. However, she, later, noticed increased redness, drainage and wound breakdown. On 12/15/2022, she underwent open irrigation and debridement with obtaining cultures and then placement of a vacuum-assisted wound closure device.  She has an upcoming right ankle hardware removal surgery by Dr. Gallardo on 03/13/2023.     Preop Questions 3/8/2023   1. Have you ever had a heart attack or stroke? No   2. Have you ever had surgery on your heart or blood vessels, such as a stent placement, a coronary artery bypass, or surgery on an artery in your head, neck, heart, or  legs? No   3. Do you have chest pain with activity? No   4. Do you have a history of  heart failure? No   5. Do you currently have a cold, bronchitis or symptoms of other infection? No   6. Do you have a cough, shortness of breath, or wheezing? No   7. Do you or anyone in your family have previous history of blood clots? No   8. Do you or does anyone in your family have a serious bleeding problem such as prolonged bleeding following surgeries or cuts? No   9. Have you ever had problems with anemia or been told to take iron pills? No   10. Have you had any abnormal blood loss such as black, tarry or bloody stools, or abnormal vaginal bleeding? No   11. Have you ever had a blood transfusion? No   12. Are you willing to have a blood transfusion if it is medically needed before, during, or after your surgery? NO -    13. Have you or any of your relatives ever had problems with anesthesia? No   14. Do you have sleep apnea, excessive snoring or daytime drowsiness? No   15. Do you have any artifical heart valves or other implanted medical devices like a pacemaker, defibrillator, or continuous glucose monitor? No   16. Do you have artificial joints? No   17. Are you allergic to latex? No   18. Is there any chance that you may be pregnant? No       Health Care Directive:  Patient does not have a Health Care Directive or Living Will:     Preoperative Review of :  Reviewed   [id number]    Status of Chronic Conditions:  See problem list for active medical problems.  Problems all longstanding and stable, except as noted/documented.  See ROS for pertinent symptoms related to these conditions.      Review of Systems  CONSTITUTIONAL: NEGATIVE for fever, chills, change in weight  INTEGUMENTARY/SKIN: NEGATIVE for worrisome rashes, moles or lesions  EYES: NEGATIVE for vision changes or irritation  ENT/MOUTH: NEGATIVE for ear, mouth and throat problems  RESP: NEGATIVE for significant cough or SOB  CV: NEGATIVE for chest pain,  palpitations or peripheral edema  GI: NEGATIVE for nausea, abdominal pain, heartburn, or change in bowel habits  : NEGATIVE for frequency, dysuria, or hematuria  MUSCULOSKELETAL: NEGATIVE for significant arthralgias or myalgia  NEURO: NEGATIVE for weakness, dizziness or paresthesias  ENDOCRINE: NEGATIVE for temperature intolerance, skin/hair changes  HEME: NEGATIVE for bleeding problems  PSYCHIATRIC: NEGATIVE for changes in mood or affect    Patient Active Problem List    Diagnosis Date Noted     Personal history of fall 03/10/2023     Priority: Medium     Tobacco use disorder 03/10/2023     Priority: Medium     Open ankle wound 12/15/2022     Priority: Medium     CARDIOVASCULAR SCREENING; LDL GOAL LESS THAN 160 10/31/2010     Priority: Medium     Menorrhagia 07/13/2010     Priority: Medium     Mild recurrent major depression (H) 05/25/2009     Priority: Medium     ASCUS on Pap smear 05/14/2008     Priority: Medium     Elderly primigravida 04/07/2008     Priority: Medium     Problem list name updated by automated process. Provider to review       Dysmenorrhea 04/07/2008     Priority: Medium     Leiomyoma of uterus 04/07/2008     Priority: Medium     Problem list name updated by automated process. Provider to review        Past Medical History:   Diagnosis Date     Adjustment disorder with mixed anxiety and depressed mood      Leiomyoma of uterus, unspecified      Past Surgical History:   Procedure Laterality Date     HC LAPAROSCOPIC MYOMECTOMY, 1 - 4 INTRAMURAL MYOMAS =<250 GM  09/2006    laparoscopic, pedunculated     IRRIGATION AND DEBRIDEMENT KNEE, COMBINED Right 12/15/2022    Procedure: Open irrigation and debridement of right distal tibia surgical wound with placement of vacuum-assisted wound closure device.;  Surgeon: Raj Gallardo MD;  Location: [facility] OR     LAMINECT/DISCECTOMY, LUMBAR  11/15/11    L4-L5     OPEN REDUCTION INTERNAL FIXATION TIBIA Right 9/29/2022    Procedure: OPEN REDUCTION  "INTERNAL FIXATION RIGHT TIBIA.;  Surgeon: Raj Gallardo MD;  Location:  OR     [facility] APPENDECTOMY,RUPT APPENDX+ABSCESS  08/1989     Current Outpatient Medications   Medication Sig Dispense Refill     COLLAGEN PO Take 1 tablet by mouth daily       fish oil-omega-3 fatty acids 1000 MG capsule Take 1 g by mouth daily       hydrOXYzine (ATARAX) 25 MG tablet Take 1 tablet (25 mg) by mouth 2 times daily as needed for anxiety 20 tablet 1     hypromellose (GENTEAL) 0.3 % SOLN ophthalmic solution Place 1 drop into both eyes every hour as needed for dry eyes       levofloxacin (LEVAQUIN) 500 MG tablet        multivitamin, therapeutic (THERA-VIT) TABS tablet Take 1 tablet by mouth daily       nicotine (NICORETTE) 2 MG gum Place 1 each (2 mg) inside cheek every hour as needed for smoking cessation 100 each 1     Vitamin D3 (CHOLECALCIFEROL) 125 MCG (5000 UT) tablet Take 125 mcg by mouth daily         Allergies   Allergen Reactions     Sumatriptan Other (See Comments)     Other reaction(s): Tachycardia  tachycardia       Sulfa Drugs Rash        Social History     Tobacco Use     Smoking status: Every Day     Packs/day: 0.50     Years: 18.00     Pack years: 9.00     Types: Cigarettes     Smokeless tobacco: Never   Substance Use Topics     Alcohol use: Yes     Comment: once or twice weekly       History   Drug Use No         Objective     /70 (BP Location: Right arm, Patient Position: Sitting)   Pulse 79   Temp 98.3  F (36.8  C) (Oral)   Resp 16   Ht 1.6 m (5' 2.99\")   Wt 62.1 kg (136 lb 14.4 oz)   LMP 06/20/2012   SpO2 100%   BMI 24.26 kg/m      Physical Exam    GENERAL APPEARANCE: healthy, alert and no distress     EYES: EOMI, PERRL     HENT: ear canals and TM's normal, narrow nasal passages and mouth without ulcers or lesions     NECK: no adenopathy, no asymmetry, masses, or scars and thyroid normal to palpation     RESP: lungs clear to auscultation - no rales, rhonchi or wheezes     CV: regular rates " and rhythm, normal S1 S2, no S3 or S4 and no murmur, click or rub     ABDOMEN:  soft, nontender, no HSM or masses and bowel sounds normal     MS: right ankle covered with wraps     SKIN: no suspicious lesions or rashes     NEURO:  mentation intact and speech normal  The fracture site was wrapped      PSYCH: mentation appears normal. and affect normal/bright     LYMPHATICS: No cervical adenopathy    Recent Labs   Lab Test 01/23/23  1515 01/16/23  1500 12/21/22  1545 12/17/22  0641 12/16/22  0715 10/12/22  1141   HGB 12.8 12.7   < > 10.7* 10.4* 13.1    329   < >  --   --   --    NA  --   --   --  135* 129* 130*   POTASSIUM  --   --   --  4.5  --  3.6   CR 0.72 0.48*   < > 0.64 0.46* 0.54    < > = values in this interval not displayed.        Diagnostics:  Recent Results (from the past 24 hour(s))   CBC with platelets    Collection Time: 03/10/23  8:33 AM   Result Value Ref Range    WBC Count 9.1 4.0 - 11.0 10e3/uL    RBC Count 3.87 3.80 - 5.20 10e6/uL    Hemoglobin 12.4 11.7 - 15.7 g/dL    Hematocrit 38.1 35.0 - 47.0 %    MCV 98 78 - 100 fL    MCH 32.0 26.5 - 33.0 pg    MCHC 32.5 31.5 - 36.5 g/dL    RDW 13.4 10.0 - 15.0 %    Platelet Count 284 150 - 450 10e3/uL      EKG: appears normal, NSR, normal axis, normal intervals, no acute ST/T changes c/w ischemia, no LVH by voltage criteria    Revised Cardiac Risk Index (RCRI):  The patient has the following serious cardiovascular risks for perioperative complications:   - No serious cardiac risks = 0 points     RCRI Interpretation: 0 points: Class I (very low risk - 0.4% complication rate)           Signed Electronically by: Maile Martinez MD  Copy of this evaluation report is provided to requesting physician.    This document serves as a record of the services and decisions personally performed and made by Dr. Martinez. It was created on her behalf by Joleen Kong, a trained medical scribe. The creation of this document is based the provider's statements to the  medical scribe.

## 2023-03-10 NOTE — H&P (VIEW-ONLY)
[facility]  [address], [address]  [place] [address]  Phone: [phone]  Primary Provider: Eb - SABRINA [facility]  Pre-op Performing Provider: VANESSA VALERIO      PREOPERATIVE EVALUATION:  Today's date: 3/10/2023    Leanne Valentino is a 58 year old female who presents for a preoperative evaluation.    Surgical Information:  Surgery/Procedure: Right ankle hardware removal  Surgery Location:  OR  Surgeon: Raj Gallardo MD  Surgery Date: 3/13/23  Time of Surgery: 7:20AM  Where patient plans to recover: At home with family  Fax number for surgical facility: Note does not need to be faxed, will be available electronically in Epic.    Type of Anesthesia Anticipated: General    Laenne was seen today for pre-op exam.    Diagnoses and all orders for this visit:    Preop general physical exam  -     CBC with platelets; Future  -     EKG 12-lead complete w/read - Clinics    Instructed the patient to avoid  aspirin, fish oils, multivitamins and herbal supplements and NSAIDs such as ibuprofen, motrin or aleve 7 days before the surgery. She can resume them after the surgery.     She will continue taking vitamin D3 and tylenol is okay to take for pain.     No history of any anesthesia complications   No family history of any anesthesia complications.    S/P ORIF (open reduction internal fixation) fracture  Comments:  right ankle, reviewed orthopedic note     Ankle fracture, right, closed, with delayed healing, subsequent encounter   On 09/29/2022,  she fell and had a right distal tibia fracture that was treated with ORIF. At that time, fracture was healing nicely but she developed an infection around her wound and was treated with antibiotics. However, she, later, noticed increased redness, drainage and wound breakdown. On 12/15/2022, she underwent open irrigation and debridement with obtaining cultures and then placement of a vacuum-assisted wound closure device.   She has an upcoming right ankle hardware removal surgery by Dr. Gallardo on 03/13/2023.    She is still on levaquin 500 mg     Personal history of fall   See above     History of uterine fibroid   S/p Laparoscopic myomectomy     Mild recurrent major depression (H)   She is not taking any antidepressants. Vitamin D3 helps improving her mood per patient report .     Tobacco use disorder  -     nicotine (NICORETTE) 2 MG gum; Place 1 each (2 mg) inside cheek every hour as needed for smoking cessation    She reports current use of cigars.     Educated her that smoking can slow down the wound healing and also provided information about nicotine options such as gums and patches. Patient verbalized understanding and agreed to start on nicotine gums. I recommended her to start as soon as she can, preferably today.  Discussed that the smoking affects the healing of the wound so it is very important she quits now    Medication management  -     Comprehensive metabolic panel; Future    Screening for HIV (human immunodeficiency virus)  -     HIV Antigen Antibody Combo; Future    Need for hepatitis C screening test  -     Hepatitis C Screen Reflex to HCV RNA Quant and Genotype; Future    Screening for hyperlipidemia  -     Lipid panel reflex to direct LDL Fasting; Future    Screening for thyroid disorder  -     TSH with free T4 reflex; Future    Screening for deficiency anemia  -     Ferritin; Future    Screen for colon cancer  -     Fecal colorectal cancer screen (FIT); Future    Anxiety  -     hydrOXYzine (ATARAX) 25 MG tablet; Take 1 tablet (25 mg) by mouth 2 times daily as needed for anxiety    She reports developing some anxiety with upcoming surgeries and finance for her procedures. I assured her and provided information about hydroxyzine to help relieve her.  Patient agrees to the medication treatment     Other   Today blood pressure elevated at 147/89. Rechecked blood pressure at 134/70. Home health nurse visits patient's  home and monitors her blood pressure - the values were reportedly around 120/80   I encouraged patient to establish a PCP and schedule physical routine at her earliest.    She states that she is up to date with COVID-19 vaccines and will bring the card later to update the information into the symptom   She understands that mammogram is overdue. Patient will make an appointment at her earliest convenience.    Patient receives both Tdap and pneumococcal vaccine.    Recommended patient to get shingles at local drug store after her procedure    Patient is optimal for above procedure.    Avoid aspirin 7 days before the surgery. Avoid nonsteroidal anti-inflammatory pain medication like ibuprofen, Motrin, or Aleve 7 days before the surgery.  Tylenol is okay to use for pain.  Avoid any OTC multivitamins or herbal supplement 7 days before surgery   Resume after surgery    Preventive health counseling was also done.  Advise to schedule physical as she is due for mammogram , pap test and colonoscopy  She is going to bring record of covid shots     Subjective     HPI related to upcoming procedure: Leanne is 58 year old, presenting here for pre op evaluation. On 09/29/2022,  she fell and had a right distal tibia fracture that was treated with ORIF. At that time, fracture was healing nicely but she developed an infection around her wound and was treated with antibiotics. However, she, later, noticed increased redness, drainage and wound breakdown. On 12/15/2022, she underwent open irrigation and debridement with obtaining cultures and then placement of a vacuum-assisted wound closure device.  She has an upcoming right ankle hardware removal surgery by Dr. Gallardo on 03/13/2023.     Preop Questions 3/8/2023   1. Have you ever had a heart attack or stroke? No   2. Have you ever had surgery on your heart or blood vessels, such as a stent placement, a coronary artery bypass, or surgery on an artery in your head, neck, heart, or  legs? No   3. Do you have chest pain with activity? No   4. Do you have a history of  heart failure? No   5. Do you currently have a cold, bronchitis or symptoms of other infection? No   6. Do you have a cough, shortness of breath, or wheezing? No   7. Do you or anyone in your family have previous history of blood clots? No   8. Do you or does anyone in your family have a serious bleeding problem such as prolonged bleeding following surgeries or cuts? No   9. Have you ever had problems with anemia or been told to take iron pills? No   10. Have you had any abnormal blood loss such as black, tarry or bloody stools, or abnormal vaginal bleeding? No   11. Have you ever had a blood transfusion? No   12. Are you willing to have a blood transfusion if it is medically needed before, during, or after your surgery? NO -    13. Have you or any of your relatives ever had problems with anesthesia? No   14. Do you have sleep apnea, excessive snoring or daytime drowsiness? No   15. Do you have any artifical heart valves or other implanted medical devices like a pacemaker, defibrillator, or continuous glucose monitor? No   16. Do you have artificial joints? No   17. Are you allergic to latex? No   18. Is there any chance that you may be pregnant? No       Health Care Directive:  Patient does not have a Health Care Directive or Living Will:     Preoperative Review of :  Reviewed   [id number]    Status of Chronic Conditions:  See problem list for active medical problems.  Problems all longstanding and stable, except as noted/documented.  See ROS for pertinent symptoms related to these conditions.      Review of Systems  CONSTITUTIONAL: NEGATIVE for fever, chills, change in weight  INTEGUMENTARY/SKIN: NEGATIVE for worrisome rashes, moles or lesions  EYES: NEGATIVE for vision changes or irritation  ENT/MOUTH: NEGATIVE for ear, mouth and throat problems  RESP: NEGATIVE for significant cough or SOB  CV: NEGATIVE for chest pain,  palpitations or peripheral edema  GI: NEGATIVE for nausea, abdominal pain, heartburn, or change in bowel habits  : NEGATIVE for frequency, dysuria, or hematuria  MUSCULOSKELETAL: NEGATIVE for significant arthralgias or myalgia  NEURO: NEGATIVE for weakness, dizziness or paresthesias  ENDOCRINE: NEGATIVE for temperature intolerance, skin/hair changes  HEME: NEGATIVE for bleeding problems  PSYCHIATRIC: NEGATIVE for changes in mood or affect    Patient Active Problem List    Diagnosis Date Noted     Personal history of fall 03/10/2023     Priority: Medium     Tobacco use disorder 03/10/2023     Priority: Medium     Open ankle wound 12/15/2022     Priority: Medium     CARDIOVASCULAR SCREENING; LDL GOAL LESS THAN 160 10/31/2010     Priority: Medium     Menorrhagia 07/13/2010     Priority: Medium     Mild recurrent major depression (H) 05/25/2009     Priority: Medium     ASCUS on Pap smear 05/14/2008     Priority: Medium     Elderly primigravida 04/07/2008     Priority: Medium     Problem list name updated by automated process. Provider to review       Dysmenorrhea 04/07/2008     Priority: Medium     Leiomyoma of uterus 04/07/2008     Priority: Medium     Problem list name updated by automated process. Provider to review        Past Medical History:   Diagnosis Date     Adjustment disorder with mixed anxiety and depressed mood      Leiomyoma of uterus, unspecified      Past Surgical History:   Procedure Laterality Date     HC LAPAROSCOPIC MYOMECTOMY, 1 - 4 INTRAMURAL MYOMAS =<250 GM  09/2006    laparoscopic, pedunculated     IRRIGATION AND DEBRIDEMENT KNEE, COMBINED Right 12/15/2022    Procedure: Open irrigation and debridement of right distal tibia surgical wound with placement of vacuum-assisted wound closure device.;  Surgeon: Raj Gallardo MD;  Location: [facility] OR     LAMINECT/DISCECTOMY, LUMBAR  11/15/11    L4-L5     OPEN REDUCTION INTERNAL FIXATION TIBIA Right 9/29/2022    Procedure: OPEN REDUCTION  "INTERNAL FIXATION RIGHT TIBIA.;  Surgeon: Raj Gallardo MD;  Location:  OR     [facility] APPENDECTOMY,RUPT APPENDX+ABSCESS  08/1989     Current Outpatient Medications   Medication Sig Dispense Refill     COLLAGEN PO Take 1 tablet by mouth daily       fish oil-omega-3 fatty acids 1000 MG capsule Take 1 g by mouth daily       hydrOXYzine (ATARAX) 25 MG tablet Take 1 tablet (25 mg) by mouth 2 times daily as needed for anxiety 20 tablet 1     hypromellose (GENTEAL) 0.3 % SOLN ophthalmic solution Place 1 drop into both eyes every hour as needed for dry eyes       levofloxacin (LEVAQUIN) 500 MG tablet        multivitamin, therapeutic (THERA-VIT) TABS tablet Take 1 tablet by mouth daily       nicotine (NICORETTE) 2 MG gum Place 1 each (2 mg) inside cheek every hour as needed for smoking cessation 100 each 1     Vitamin D3 (CHOLECALCIFEROL) 125 MCG (5000 UT) tablet Take 125 mcg by mouth daily         Allergies   Allergen Reactions     Sumatriptan Other (See Comments)     Other reaction(s): Tachycardia  tachycardia       Sulfa Drugs Rash        Social History     Tobacco Use     Smoking status: Every Day     Packs/day: 0.50     Years: 18.00     Pack years: 9.00     Types: Cigarettes     Smokeless tobacco: Never   Substance Use Topics     Alcohol use: Yes     Comment: once or twice weekly       History   Drug Use No         Objective     /70 (BP Location: Right arm, Patient Position: Sitting)   Pulse 79   Temp 98.3  F (36.8  C) (Oral)   Resp 16   Ht 1.6 m (5' 2.99\")   Wt 62.1 kg (136 lb 14.4 oz)   LMP 06/20/2012   SpO2 100%   BMI 24.26 kg/m      Physical Exam    GENERAL APPEARANCE: healthy, alert and no distress     EYES: EOMI, PERRL     HENT: ear canals and TM's normal, narrow nasal passages and mouth without ulcers or lesions     NECK: no adenopathy, no asymmetry, masses, or scars and thyroid normal to palpation     RESP: lungs clear to auscultation - no rales, rhonchi or wheezes     CV: regular rates " and rhythm, normal S1 S2, no S3 or S4 and no murmur, click or rub     ABDOMEN:  soft, nontender, no HSM or masses and bowel sounds normal     MS: right ankle covered with wraps     SKIN: no suspicious lesions or rashes     NEURO:  mentation intact and speech normal  The fracture site was wrapped      PSYCH: mentation appears normal. and affect normal/bright     LYMPHATICS: No cervical adenopathy    Recent Labs   Lab Test 01/23/23  1515 01/16/23  1500 12/21/22  1545 12/17/22  0641 12/16/22  0715 10/12/22  1141   HGB 12.8 12.7   < > 10.7* 10.4* 13.1    329   < >  --   --   --    NA  --   --   --  135* 129* 130*   POTASSIUM  --   --   --  4.5  --  3.6   CR 0.72 0.48*   < > 0.64 0.46* 0.54    < > = values in this interval not displayed.        Diagnostics:  Recent Results (from the past 24 hour(s))   CBC with platelets    Collection Time: 03/10/23  8:33 AM   Result Value Ref Range    WBC Count 9.1 4.0 - 11.0 10e3/uL    RBC Count 3.87 3.80 - 5.20 10e6/uL    Hemoglobin 12.4 11.7 - 15.7 g/dL    Hematocrit 38.1 35.0 - 47.0 %    MCV 98 78 - 100 fL    MCH 32.0 26.5 - 33.0 pg    MCHC 32.5 31.5 - 36.5 g/dL    RDW 13.4 10.0 - 15.0 %    Platelet Count 284 150 - 450 10e3/uL      EKG: appears normal, NSR, normal axis, normal intervals, no acute ST/T changes c/w ischemia, no LVH by voltage criteria    Revised Cardiac Risk Index (RCRI):  The patient has the following serious cardiovascular risks for perioperative complications:   - No serious cardiac risks = 0 points     RCRI Interpretation: 0 points: Class I (very low risk - 0.4% complication rate)           Signed Electronically by: Maile Martinez MD  Copy of this evaluation report is provided to requesting physician.    This document serves as a record of the services and decisions personally performed and made by Dr. Martinez. It was created on her behalf by Joleen Kong, a trained medical scribe. The creation of this document is based the provider's statements to the  medical scribe.

## 2023-03-10 NOTE — PATIENT INSTRUCTIONS
Monitor your blood pressure once a week  at home.  Bring those readings on your next visit.  Notify us if your blood pressure readings consistently stays greater than 140/90. Avoid aspirin 7 days before the surgery. Avoid nonsteroidal anti-inflammatory pain medication like ibuprofen, Motrin, or Aleve 7 days before the surgery.  Tylenol is okay to use for pain.  Avoid any OTC multivitamins or herbal supplement 7 days before surgery   Resume after surgery     Use nicotine gums as needed     You are due for mammogram.  Please call the following number to make appointment :  [phone]  It is located in [address]    There is a new shingles vaccine available called shingrex  It is a series of 2 shots 2-6 months apart.  Considered more than 90% effective.  Please go to any pharmacy to get the  vaccine    Make appointment for physical as you are due       Seek sooner medical attention if there is any worsening of symptoms or problems.

## 2023-03-11 NOTE — RESULT ENCOUNTER NOTE
Genevieve Perdomo    This is to inform you regarding your test result.    The testing of your blood sugar, kidney function, liver function and electrolytes was at his factory  Slightly low carbohydrate and ALT level is nothing to worry about  HIV 1&2 Antibody is negative.  Hepatitis C Antibody is negative.  Your total cholesterol is normal.  HDL which is called good cholesterol is normal.  Your LDL cholesterol is normal.  This is often call bad cholesterol and high levels increase the risk for heart attacks and strokes.  Your triglycerides are normal.  TSH which is thyroid hormone is normal.  Ferritin which is iron stores in the body is normal.  CBC result which includes white count Hemoglobin and  Platelet Counts is normal.           Sincerely,      Dr.Nasima Michelle MD,FACP

## 2023-03-13 ENCOUNTER — APPOINTMENT (OUTPATIENT)
Dept: GENERAL RADIOLOGY | Facility: CLINIC | Age: 58
End: 2023-03-13
Attending: ORTHOPAEDIC SURGERY
Payer: COMMERCIAL

## 2023-03-13 ENCOUNTER — ANESTHESIA (OUTPATIENT)
Dept: SURGERY | Facility: CLINIC | Age: 58
End: 2023-03-13
Payer: COMMERCIAL

## 2023-03-13 ENCOUNTER — HOSPITAL ENCOUNTER (OUTPATIENT)
Facility: CLINIC | Age: 58
Discharge: HOME | End: 2023-03-13
Attending: ORTHOPAEDIC SURGERY | Admitting: ORTHOPAEDIC SURGERY
Payer: COMMERCIAL

## 2023-03-13 ENCOUNTER — ANESTHESIA EVENT (OUTPATIENT)
Dept: SURGERY | Facility: CLINIC | Age: 58
End: 2023-03-13
Payer: COMMERCIAL

## 2023-03-13 VITALS
BODY MASS INDEX: 23.92 KG/M2 | OXYGEN SATURATION: 100 % | HEIGHT: 63 IN | TEMPERATURE: 97.9 F | RESPIRATION RATE: 16 BRPM | SYSTOLIC BLOOD PRESSURE: 145 MMHG | WEIGHT: 135 LBS | DIASTOLIC BLOOD PRESSURE: 95 MMHG | HEART RATE: 78 BPM

## 2023-03-13 DIAGNOSIS — Z87.81 S/P ORIF (OPEN REDUCTION INTERNAL FIXATION) FRACTURE: Primary | ICD-10-CM

## 2023-03-13 DIAGNOSIS — Z98.890 S/P ORIF (OPEN REDUCTION INTERNAL FIXATION) FRACTURE: Primary | ICD-10-CM

## 2023-03-13 LAB
DEPRECATED CALCIDIOL+CALCIFEROL SERPL-MC: 142 UG/L (ref 20–75)
GRAM STAIN RESULT: NORMAL

## 2023-03-13 PROCEDURE — 710N000012 HC RECOVERY PHASE 2, PER MINUTE: Performed by: ORTHOPAEDIC SURGERY

## 2023-03-13 PROCEDURE — 250N000013 HC RX MED GY IP 250 OP 250 PS 637: Performed by: ANESTHESIOLOGY

## 2023-03-13 PROCEDURE — 87205 SMEAR GRAM STAIN: CPT | Performed by: ORTHOPAEDIC SURGERY

## 2023-03-13 PROCEDURE — 999N000141 HC STATISTIC PRE-PROCEDURE NURSING ASSESSMENT: Performed by: ORTHOPAEDIC SURGERY

## 2023-03-13 PROCEDURE — 360N000082 HC SURGERY LEVEL 2 W/ FLUORO, PER MIN: Performed by: ORTHOPAEDIC SURGERY

## 2023-03-13 PROCEDURE — 87077 CULTURE AEROBIC IDENTIFY: CPT | Mod: 59 | Performed by: ORTHOPAEDIC SURGERY

## 2023-03-13 PROCEDURE — 250N000011 HC RX IP 250 OP 636: Performed by: NURSE ANESTHETIST, CERTIFIED REGISTERED

## 2023-03-13 PROCEDURE — 250N000009 HC RX 250: Performed by: ORTHOPAEDIC SURGERY

## 2023-03-13 PROCEDURE — 250N000013 HC RX MED GY IP 250 OP 250 PS 637: Performed by: PHYSICIAN ASSISTANT

## 2023-03-13 PROCEDURE — 272N000001 HC OR GENERAL SUPPLY STERILE: Performed by: ORTHOPAEDIC SURGERY

## 2023-03-13 PROCEDURE — 250N000009 HC RX 250: Performed by: PHYSICIAN ASSISTANT

## 2023-03-13 PROCEDURE — 370N000017 HC ANESTHESIA TECHNICAL FEE, PER MIN: Performed by: ORTHOPAEDIC SURGERY

## 2023-03-13 PROCEDURE — 250N000011 HC RX IP 250 OP 636: Performed by: ORTHOPAEDIC SURGERY

## 2023-03-13 PROCEDURE — 999N000179 XR SURGERY CARM FLUORO LESS THAN 5 MIN W STILLS: Mod: TC

## 2023-03-13 PROCEDURE — 87176 TISSUE HOMOGENIZATION CULTR: CPT | Performed by: ORTHOPAEDIC SURGERY

## 2023-03-13 PROCEDURE — 250N000011 HC RX IP 250 OP 636: Performed by: PHYSICIAN ASSISTANT

## 2023-03-13 PROCEDURE — 250N000011 HC RX IP 250 OP 636: Performed by: ANESTHESIOLOGY

## 2023-03-13 PROCEDURE — 250N000025 HC SEVOFLURANE, PER MIN: Performed by: ORTHOPAEDIC SURGERY

## 2023-03-13 PROCEDURE — 250N000009 HC RX 250: Performed by: NURSE ANESTHETIST, CERTIFIED REGISTERED

## 2023-03-13 PROCEDURE — 258N000003 HC RX IP 258 OP 636: Performed by: NURSE ANESTHETIST, CERTIFIED REGISTERED

## 2023-03-13 PROCEDURE — 710N000009 HC RECOVERY PHASE 1, LEVEL 1, PER MIN: Performed by: ORTHOPAEDIC SURGERY

## 2023-03-13 RX ORDER — KETOROLAC TROMETHAMINE 30 MG/ML
INJECTION, SOLUTION INTRAMUSCULAR; INTRAVENOUS PRN
Status: DISCONTINUED | OUTPATIENT
Start: 2023-03-13 | End: 2023-03-13

## 2023-03-13 RX ORDER — DEXAMETHASONE SODIUM PHOSPHATE 4 MG/ML
INJECTION, SOLUTION INTRA-ARTICULAR; INTRALESIONAL; INTRAMUSCULAR; INTRAVENOUS; SOFT TISSUE PRN
Status: DISCONTINUED | OUTPATIENT
Start: 2023-03-13 | End: 2023-03-13

## 2023-03-13 RX ORDER — LIDOCAINE 40 MG/G
CREAM TOPICAL
Status: DISCONTINUED | OUTPATIENT
Start: 2023-03-13 | End: 2023-03-13 | Stop reason: HOSPADM

## 2023-03-13 RX ORDER — ONDANSETRON 4 MG/1
4 TABLET, ORALLY DISINTEGRATING ORAL EVERY 30 MIN PRN
Status: DISCONTINUED | OUTPATIENT
Start: 2023-03-13 | End: 2023-03-13 | Stop reason: HOSPADM

## 2023-03-13 RX ORDER — HYDROMORPHONE HCL IN WATER/PF 6 MG/30 ML
0.4 PATIENT CONTROLLED ANALGESIA SYRINGE INTRAVENOUS EVERY 5 MIN PRN
Status: DISCONTINUED | OUTPATIENT
Start: 2023-03-13 | End: 2023-03-13 | Stop reason: HOSPADM

## 2023-03-13 RX ORDER — GLYCOPYRROLATE 0.2 MG/ML
INJECTION, SOLUTION INTRAMUSCULAR; INTRAVENOUS PRN
Status: DISCONTINUED | OUTPATIENT
Start: 2023-03-13 | End: 2023-03-13

## 2023-03-13 RX ORDER — CEFAZOLIN SODIUM/WATER 2 G/20 ML
2 SYRINGE (ML) INTRAVENOUS SEE ADMIN INSTRUCTIONS
Status: DISCONTINUED | OUTPATIENT
Start: 2023-03-13 | End: 2023-03-13 | Stop reason: HOSPADM

## 2023-03-13 RX ORDER — ONDANSETRON 2 MG/ML
INJECTION INTRAMUSCULAR; INTRAVENOUS PRN
Status: DISCONTINUED | OUTPATIENT
Start: 2023-03-13 | End: 2023-03-13

## 2023-03-13 RX ORDER — PROPOFOL 10 MG/ML
INJECTION, EMULSION INTRAVENOUS PRN
Status: DISCONTINUED | OUTPATIENT
Start: 2023-03-13 | End: 2023-03-13

## 2023-03-13 RX ORDER — SODIUM CHLORIDE, SODIUM LACTATE, POTASSIUM CHLORIDE, CALCIUM CHLORIDE 600; 310; 30; 20 MG/100ML; MG/100ML; MG/100ML; MG/100ML
INJECTION, SOLUTION INTRAVENOUS CONTINUOUS
Status: DISCONTINUED | OUTPATIENT
Start: 2023-03-13 | End: 2023-03-13 | Stop reason: HOSPADM

## 2023-03-13 RX ORDER — IBUPROFEN 600 MG/1
600 TABLET, FILM COATED ORAL EVERY 6 HOURS PRN
Qty: 30 TABLET | Refills: 0 | Status: SHIPPED | OUTPATIENT
Start: 2023-03-13 | End: 2023-07-03

## 2023-03-13 RX ORDER — IBUPROFEN 600 MG/1
600 TABLET, FILM COATED ORAL
Status: DISCONTINUED | OUTPATIENT
Start: 2023-03-13 | End: 2023-03-13 | Stop reason: HOSPADM

## 2023-03-13 RX ORDER — TRANEXAMIC ACID 10 MG/ML
1 INJECTION, SOLUTION INTRAVENOUS ONCE
Status: COMPLETED | OUTPATIENT
Start: 2023-03-13 | End: 2023-03-13

## 2023-03-13 RX ORDER — HYDROMORPHONE HCL IN WATER/PF 6 MG/30 ML
0.2 PATIENT CONTROLLED ANALGESIA SYRINGE INTRAVENOUS EVERY 5 MIN PRN
Status: DISCONTINUED | OUTPATIENT
Start: 2023-03-13 | End: 2023-03-13 | Stop reason: HOSPADM

## 2023-03-13 RX ORDER — LIDOCAINE HYDROCHLORIDE 20 MG/ML
INJECTION, SOLUTION INFILTRATION; PERINEURAL PRN
Status: DISCONTINUED | OUTPATIENT
Start: 2023-03-13 | End: 2023-03-13

## 2023-03-13 RX ORDER — FENTANYL CITRATE 50 UG/ML
INJECTION, SOLUTION INTRAMUSCULAR; INTRAVENOUS PRN
Status: DISCONTINUED | OUTPATIENT
Start: 2023-03-13 | End: 2023-03-13

## 2023-03-13 RX ORDER — SODIUM CHLORIDE, SODIUM LACTATE, POTASSIUM CHLORIDE, CALCIUM CHLORIDE 600; 310; 30; 20 MG/100ML; MG/100ML; MG/100ML; MG/100ML
INJECTION, SOLUTION INTRAVENOUS CONTINUOUS PRN
Status: DISCONTINUED | OUTPATIENT
Start: 2023-03-13 | End: 2023-03-13

## 2023-03-13 RX ORDER — MAGNESIUM HYDROXIDE 1200 MG/15ML
LIQUID ORAL PRN
Status: DISCONTINUED | OUTPATIENT
Start: 2023-03-13 | End: 2023-03-13 | Stop reason: HOSPADM

## 2023-03-13 RX ORDER — BUPIVACAINE HYDROCHLORIDE 2.5 MG/ML
INJECTION, SOLUTION EPIDURAL; INFILTRATION; INTRACAUDAL PRN
Status: DISCONTINUED | OUTPATIENT
Start: 2023-03-13 | End: 2023-03-13 | Stop reason: HOSPADM

## 2023-03-13 RX ORDER — HYDROCODONE BITARTRATE AND ACETAMINOPHEN 5; 325 MG/1; MG/1
1 TABLET ORAL
Status: DISCONTINUED | OUTPATIENT
Start: 2023-03-13 | End: 2023-03-13 | Stop reason: HOSPADM

## 2023-03-13 RX ORDER — FENTANYL CITRATE 50 UG/ML
25 INJECTION, SOLUTION INTRAMUSCULAR; INTRAVENOUS EVERY 5 MIN PRN
Status: DISCONTINUED | OUTPATIENT
Start: 2023-03-13 | End: 2023-03-13 | Stop reason: HOSPADM

## 2023-03-13 RX ORDER — FENTANYL CITRATE 50 UG/ML
50 INJECTION, SOLUTION INTRAMUSCULAR; INTRAVENOUS EVERY 5 MIN PRN
Status: DISCONTINUED | OUTPATIENT
Start: 2023-03-13 | End: 2023-03-13 | Stop reason: HOSPADM

## 2023-03-13 RX ORDER — CEFAZOLIN SODIUM/WATER 2 G/20 ML
2 SYRINGE (ML) INTRAVENOUS
Status: COMPLETED | OUTPATIENT
Start: 2023-03-13 | End: 2023-03-13

## 2023-03-13 RX ORDER — HYDROCODONE BITARTRATE AND ACETAMINOPHEN 5; 325 MG/1; MG/1
1-2 TABLET ORAL EVERY 4 HOURS PRN
Qty: 30 TABLET | Refills: 0 | Status: SHIPPED | OUTPATIENT
Start: 2023-03-13 | End: 2023-07-03

## 2023-03-13 RX ORDER — OXYCODONE HYDROCHLORIDE 5 MG/1
5 TABLET ORAL ONCE
Status: COMPLETED | OUTPATIENT
Start: 2023-03-13 | End: 2023-03-13

## 2023-03-13 RX ORDER — ACETAMINOPHEN 325 MG/1
975 TABLET ORAL ONCE
Status: COMPLETED | OUTPATIENT
Start: 2023-03-13 | End: 2023-03-13

## 2023-03-13 RX ORDER — ONDANSETRON 2 MG/ML
4 INJECTION INTRAMUSCULAR; INTRAVENOUS EVERY 30 MIN PRN
Status: DISCONTINUED | OUTPATIENT
Start: 2023-03-13 | End: 2023-03-13 | Stop reason: HOSPADM

## 2023-03-13 RX ADMIN — FENTANYL CITRATE 100 MCG: 50 INJECTION, SOLUTION INTRAMUSCULAR; INTRAVENOUS at 07:33

## 2023-03-13 RX ADMIN — PROPOFOL 40 MG: 10 INJECTION, EMULSION INTRAVENOUS at 08:33

## 2023-03-13 RX ADMIN — Medication 2 G: at 07:23

## 2023-03-13 RX ADMIN — OXYCODONE HYDROCHLORIDE 5 MG: 5 TABLET ORAL at 10:44

## 2023-03-13 RX ADMIN — ONDANSETRON 4 MG: 2 INJECTION INTRAMUSCULAR; INTRAVENOUS at 09:06

## 2023-03-13 RX ADMIN — TRANEXAMIC ACID 1 G: 10 INJECTION, SOLUTION INTRAVENOUS at 07:27

## 2023-03-13 RX ADMIN — GLYCOPYRROLATE 0.2 MG: 0.2 INJECTION, SOLUTION INTRAMUSCULAR; INTRAVENOUS at 07:33

## 2023-03-13 RX ADMIN — HYDROMORPHONE HYDROCHLORIDE 0.5 MG: 1 INJECTION, SOLUTION INTRAMUSCULAR; INTRAVENOUS; SUBCUTANEOUS at 08:37

## 2023-03-13 RX ADMIN — FENTANYL CITRATE 25 MCG: 50 INJECTION, SOLUTION INTRAMUSCULAR; INTRAVENOUS at 09:49

## 2023-03-13 RX ADMIN — FENTANYL CITRATE 25 MCG: 50 INJECTION, SOLUTION INTRAMUSCULAR; INTRAVENOUS at 09:44

## 2023-03-13 RX ADMIN — KETOROLAC TROMETHAMINE 30 MG: 30 INJECTION, SOLUTION INTRAMUSCULAR at 07:42

## 2023-03-13 RX ADMIN — HYDROMORPHONE HYDROCHLORIDE 0.5 MG: 1 INJECTION, SOLUTION INTRAMUSCULAR; INTRAVENOUS; SUBCUTANEOUS at 08:35

## 2023-03-13 RX ADMIN — DEXAMETHASONE SODIUM PHOSPHATE 4 MG: 4 INJECTION, SOLUTION INTRA-ARTICULAR; INTRALESIONAL; INTRAMUSCULAR; INTRAVENOUS; SOFT TISSUE at 07:33

## 2023-03-13 RX ADMIN — MIDAZOLAM 2 MG: 1 INJECTION INTRAMUSCULAR; INTRAVENOUS at 07:23

## 2023-03-13 RX ADMIN — PROPOFOL 150 MG: 10 INJECTION, EMULSION INTRAVENOUS at 07:33

## 2023-03-13 RX ADMIN — TRANEXAMIC ACID 1 G: 10 INJECTION, SOLUTION INTRAVENOUS at 08:45

## 2023-03-13 RX ADMIN — ACETAMINOPHEN 975 MG: 325 TABLET ORAL at 06:23

## 2023-03-13 RX ADMIN — LIDOCAINE HYDROCHLORIDE 40 MG: 20 INJECTION, SOLUTION INFILTRATION; PERINEURAL at 07:33

## 2023-03-13 RX ADMIN — SODIUM CHLORIDE, POTASSIUM CHLORIDE, SODIUM LACTATE AND CALCIUM CHLORIDE: 600; 310; 30; 20 INJECTION, SOLUTION INTRAVENOUS at 07:23

## 2023-03-13 ASSESSMENT — ACTIVITIES OF DAILY LIVING (ADL)
ADLS_ACUITY_SCORE: 35
ADLS_ACUITY_SCORE: 37
ADLS_ACUITY_SCORE: 35

## 2023-03-13 ASSESSMENT — LIFESTYLE VARIABLES: TOBACCO_USE: 1

## 2023-03-13 NOTE — OP NOTE
Procedure Date: 03/13/2023    PREOPERATIVE DIAGNOSIS:  Painful possible infected hardware right distal tibia and fibula.    POSTOPERATIVE DIAGNOSIS:  Painful possible infected hardware right distal tibia and fibula.    PROCEDURE:  Hardware removal, right distal tibia and fibula.    SURGEON:  Raj Gallardo MD.    FIRST ASSISTANT:  Cyndi Helms PA-C.    INDICATIONS FOR PROCEDURE:  Ms. Valentino is a very pleasant 58-year-old female who underwent an open reduction and internal fixation of her right distal tibia and fibular fracture in 09/2022.  Postoperatively, she did well, but then began to develop a wound and dehiscence over her anterior tibial ankle wound and this became infected and underwent an ankle wound irrigation and debridement in December and at that time the cultures of the wound grew polymicrobial and showed a polymicrobial infection.  We treated her with local wound care with a wound VAC and antibiotics.  She responded quite nicely to this and has been on suppressive antibiotics since then since completing her IV antibiotic course, but has been unable to tolerate many of the oral antibiotics, due to primarily GI issues.  So, discussion was had about removing the hardware and then potentially being able to at some point in the near future be able to discontinue her antibiotics.  She understands and is happy with this plan of care and wishes to proceed with surgery.  She still has an open wound that is granulating nicely over her anterior tibia wound.  She knows we will be going through this wound, and would likely be unable to close the wound primarily, so we will plan for wet-to-dry dressings and follow up with a plastic surgery colleague for discussion of long-term wound treatment versus closure.    NARRATIVE EVENTS:  After thorough evaluation and proper identification of the patient to be operated on, Ms. Valentino was taken to the operating room where she underwent a general anesthetic, placed supine on  the operating table and her right leg was prepped and draped in the usual sterile manner.  After appropriate surgical pause confirming the patient's extremity to be operated on, 10 minutes after she received 2 grams of Ancef, her right leg was approached through her previous incisions.  The anterior incision was opened proximally through her previous incision and then dissection was taken laterally along the open wound bed mobilizing her anterior tibial musculature laterally so that we could expose the plate and screws on the tibial.  We did this, we were able to remove the screws, both the ones in the plate and the ones in the lag screws that were placed without much difficulty.  Fracture was well healed.  Cultures were taken, but there was no obvious purulence.  At this point, once the hardware was removed, we then thoroughly irrigated the wound and we paid our attention to the fibula.  Here, we opened her previous incision.  Skin and soft tissue sharply dissected down to the fibula, which was easily identified and the hardware was cleared of all fibrinous debris.  The screws were removed and the plate was removed from the bone.  Again, the fracture was well healed.  The wounds were all thoroughly irrigated.  We closed the fibular wound with absorbable sutures and nylon sutures in the skin.  The tibial wound, we closed proximally where we had opened with absorbable sutures and nylon sutures in the skin and distally we were able to extend the incision slightly, but the area along the wound, we brought somewhat together using some absorbable sutures, but the main portion of the wound, which measured about 2.3 cm wide at its widest point and 6.3 cm from proximal to distal this was left open and a wet-to-dry dressing was placed over this.  The patient was then placed in a well-padded postoperative dressing, taken to recovery room in stable condition.  Her incisions were injected with 0.25% bupivacaine without  epinephrine prior to discharge.    Raj Gallardo MD        D: 2023   T: 2023   MT: YESSENIA    Name:     JUAN MANUEL PERDUE  MRN:      [id number]        Account:        [id number]   :      1965           Procedure Date: 2023     Document: [id number]

## 2023-03-13 NOTE — ANESTHESIA PREPROCEDURE EVALUATION
Anesthesia Pre-Procedure Evaluation    Patient: Leanne Valentino   MRN: [id number] : 1965        Procedure : Procedure(s):  Right ankle hardware removal          Past Medical History:   Diagnosis Date     Adjustment disorder with mixed anxiety and depressed mood      Leiomyoma of uterus, unspecified       Past Surgical History:   Procedure Laterality Date     HC LAPAROSCOPIC MYOMECTOMY, 1 - 4 INTRAMURAL MYOMAS =<250 GM  2006    laparoscopic, pedunculated     IRRIGATION AND DEBRIDEMENT KNEE, COMBINED Right 12/15/2022    Procedure: Open irrigation and debridement of right distal tibia surgical wound with placement of vacuum-assisted wound closure device.;  Surgeon: Raj Gallardo MD;  Location: [facility] OR     LAMINECT/DISCECTOMY, LUMBAR  11/15/11    L4-L5     OPEN REDUCTION INTERNAL FIXATION TIBIA Right 2022    Procedure: OPEN REDUCTION INTERNAL FIXATION RIGHT TIBIA.;  Surgeon: Raj Gallardo MD;  Location: [facility] OR     [facility] APPENDECTOMY,RUPT APPENDX+ABSCESS  1989      Allergies   Allergen Reactions     Sumatriptan Other (See Comments)     Other reaction(s): Tachycardia  tachycardia       Sulfa Drugs Rash      Social History     Tobacco Use     Smoking status: Every Day     Packs/day: 0.50     Years: 18.00     Pack years: 9.00     Types: Cigarettes     Smokeless tobacco: Never   Substance Use Topics     Alcohol use: Yes     Comment: once or twice weekly      Wt Readings from Last 1 Encounters:   23 61.2 kg (135 lb)        Anesthesia Evaluation            ROS/MED HX  ENT/Pulmonary:     (+) tobacco use,     Neurologic:  - neg neurologic ROS     Cardiovascular:  - neg cardiovascular ROS     METS/Exercise Tolerance: >4 METS    Hematologic:  - neg hematologic  ROS     Musculoskeletal:  - neg musculoskeletal ROS     GI/Hepatic:  - neg GI/hepatic ROS     Renal/Genitourinary:  - neg Renal ROS     Endo:  - neg endo ROS     Psychiatric/Substance Use:  - neg psychiatric ROS     Infectious  Disease:  - neg infectious disease ROS     Malignancy:  - neg malignancy ROS     Other:  - neg other ROS          Physical Exam    Airway        Mallampati: II       Respiratory Devices and Support         Dental           Cardiovascular             Pulmonary                   OUTSIDE LABS:  CBC:   Lab Results   Component Value Date    WBC 9.1 03/10/2023    WBC 5.4 01/23/2023    HGB 12.4 03/10/2023    HGB 12.8 01/23/2023    HCT 38.1 03/10/2023    HCT 37.3 01/23/2023     03/10/2023     01/23/2023     BMP:   Lab Results   Component Value Date     03/10/2023     (L) 12/17/2022    POTASSIUM 3.7 03/10/2023    POTASSIUM 4.5 12/17/2022    CHLORIDE 103 03/10/2023    CHLORIDE 102 12/17/2022    CO2 21 (L) 03/10/2023    CO2 25 12/17/2022    BUN 13.7 03/10/2023    BUN 17.5 01/23/2023    CR 0.53 03/10/2023    CR 0.72 01/23/2023    GLC 97 03/10/2023    GLC 78 12/17/2022     COAGS: No results found for: PTT, INR, FIBR  POC:   Lab Results   Component Value Date    HCG Negative 08/03/2010     HEPATIC:   Lab Results   Component Value Date    ALBUMIN 4.4 03/10/2023    PROTTOTAL 7.5 03/10/2023    ALT 8 (L) 03/10/2023    AST 17 03/10/2023    ALKPHOS 92 03/10/2023    BILITOTAL 0.4 03/10/2023     OTHER:   Lab Results   Component Value Date    ALICE 9.6 03/10/2023    TSH 1.09 03/10/2023    T4 1.11 09/30/2022    CRP <2.9 01/16/2023    SED 10 01/23/2023       Anesthesia Plan    ASA Status:  2      Anesthesia Type: General.   Induction: Intravenous, Propofol.   Maintenance: Balanced.        Consents    Anesthesia Plan(s) and associated risks, benefits, and realistic alternatives discussed. Questions answered and patient/representative(s) expressed understanding.    - Discussed:     - Discussed with:  Patient         Postoperative Care    Pain management: IV analgesics, Oral pain medications, Multi-modal analgesia.   PONV prophylaxis: Ondansetron (or other 5HT-3), Dexamethasone or Solumedrol     Comments:                 Pedro Murillo, DO

## 2023-03-13 NOTE — DISCHARGE INSTRUCTIONS
GENERAL ANESTHESIA OR SEDATION ADULT DISCHARGE INSTRUCTIONS   SPECIAL PRECAUTIONS FOR 24 HOURS AFTER SURGERY    IT IS NOT UNUSUAL TO FEEL LIGHT-HEADED OR FAINT, UP TO 24 HOURS AFTER SURGERY OR WHILE TAKING PAIN MEDICATION.  IF YOU HAVE THESE SYMPTOMS; SIT FOR A FEW MINUTES BEFORE STANDING AND HAVE SOMEONE ASSIST YOU WHEN YOU GET UP TO WALK OR USE THE BATHROOM.    YOU SHOULD REST AND RELAX FOR THE NEXT 24 HOURS AND YOU MUST MAKE ARRANGEMENTS TO HAVE SOMEONE STAY WITH YOU FOR AT LEAST 24 HOURS AFTER YOUR DISCHARGE.  AVOID HAZARDOUS AND STRENUOUS ACTIVITIES.  DO NOT MAKE IMPORTANT DECISIONS FOR 24 HOURS.    DO NOT DRIVE ANY VEHICLE OR OPERATE MECHANICAL EQUIPMENT FOR 24 HOURS FOLLOWING THE END OF YOUR SURGERY.  EVEN THOUGH YOU MAY FEEL NORMAL, YOUR REACTIONS MAY BE AFFECTED BY THE MEDICATION YOU HAVE RECEIVED.    DO NOT DRINK ALCOHOLIC BEVERAGES FOR 24 HOURS FOLLOWING YOUR SURGERY.    DRINK CLEAR LIQUIDS (APPLE JUICE, GINGER ALE, 7-UP, BROTH, ETC.).  PROGRESS TO YOUR REGULAR DIET AS YOU FEEL ABLE.    YOU MAY HAVE A DRY MOUTH, A SORE THROAT, MUSCLES ACHES OR TROUBLE SLEEPING.  THESE SHOULD GO AWAY AFTER 24 HOURS.    CALL YOUR DOCTOR FOR ANY OF THE FOLLOWING:  SIGNS OF INFECTION (FEVER, GROWING TENDERNESS AT THE SURGERY SITE, A LARGE AMOUNT OF DRAINAGE OR BLEEDING, SEVERE PAIN, FOUL-SMELLING DRAINAGE, REDNESS OR SWELLING.    IT HAS BEEN OVER 8 TO 10 HOURS SINCE SURGERY AND YOU ARE STILL NOT ABLE TO URINATE (PASS WATER).     DR. GRZAYNA MILLER M.D.          CLINIC PHONE NUMBER:  [phone]  [facility]     You received Toradol, an IV form of Ibuprofen (Motrin) at 07:42 am.  Do not take any Ibuprofen products until 03:50 pm.

## 2023-03-13 NOTE — ANESTHESIA CARE TRANSFER NOTE
Patient: Leanne Valentino    Procedure: Procedure(s):  Right ankle hardware removal       Diagnosis: Pain from implanted hardware [T85.848A]  Diagnosis Additional Information: No value filed.    Anesthesia Type:   General     Note:    Oropharynx: oropharynx clear of all foreign objects and spontaneously breathing  Level of Consciousness: awake  Oxygen Supplementation: face mask    Independent Airway: airway patency satisfactory and stable  Dentition: dentition unchanged  Vital Signs Stable: post-procedure vital signs reviewed and stable  Report to RN Given: handoff report given  Patient transferred to: PACU  Comments: Report and signed off to RN in PACU.  Good Resps, skin pink, VSS, O2 via face tent.  Handoff Report: Identifed the Patient, Identified the Reponsible Provider, Reviewed the pertinent medical history, Discussed the surgical course, Reviewed Intra-OP anesthesia mangement and issues during anesthesia, Set expectations for post-procedure period and Allowed opportunity for questions and acknowledgement of understanding      Vitals:  Vitals Value Taken Time   /90 03/13/23 0926   Temp     Pulse 89 03/13/23 0928   Resp 19 03/13/23 0928   SpO2 100 % 03/13/23 0928   Vitals shown include unvalidated device data.    Electronically Signed By: SHIV Peterson CRNA  March 13, 2023  9:30 AM

## 2023-03-13 NOTE — ANESTHESIA PROCEDURE NOTES
Airway       Patient location during procedure: OR       Procedure Start/Stop Times: 3/13/2023 7:34 AM  Staff -        Anesthesiologist:  Chica Ramirez APRN CRNA       Performed By: CRNA  Consent for Airway        Urgency: elective  Indications and Patient Condition       Indications for airway management: jose-procedural       Induction type:intravenous       Mask difficulty assessment: 1 - vent by mask    Final Airway Details       Final airway type: supraglottic airway    Supraglottic Airway Details        Type: LMA       Brand: I-Gel       LMA size: 4    Post intubation assessment        Placement verified by: capnometry, equal breath sounds and chest rise        Number of attempts at approach: 1       Secured with: commercial tube schwab       Ease of procedure: easy       Dentition: Intact    Medication(s) Administered   Medication Administration Time: 3/13/2023 7:34 AM

## 2023-03-13 NOTE — BRIEF OP NOTE
[facility]    Brief Operative Note    Pre-operative diagnosis: Pain from implanted hardware [T85.841P]  Post-operative diagnosis Same as pre-operative diagnosis    Procedure: Procedure(s):  Right ankle hardware removal  Surgeon: Surgeon(s) and Role:     * Raj Gallardo MD - Primary     * Cyndi Helms PA-C - Assisting  Anesthesia: General   Estimated Blood Loss: Less than 10 ml    Drains: None  Specimens:   ID Type Source Tests Collected by Time Destination   A : Right ankle tissue #1 Tissue Ankle, Right ANAEROBIC BACTERIAL CULTURE ROUTINE, GRAM STAIN, AEROBIC BACTERIAL CULTURE ROUTINE Raj Gallardo MD 3/13/2023  7:59 AM    B : Right ankle tissue #2 Tissue Ankle, Right ANAEROBIC BACTERIAL CULTURE ROUTINE, GRAM STAIN, AEROBIC BACTERIAL CULTURE ROUTINE Raj Gallardo MD 3/13/2023  8:00 AM      Findings:   None.  Complications: None.  Implants:   Implant Name Type Inv. Item Serial No.  Lot No. LRB No. Used Action   PLATE DIST TIBIA PEPE 8H RT 2.7-3.5MM - [id number] Metallic Hardware/[place] PLATE DIST TIBIA PEPE 8H RT 2.7-3.5MM  [employer] 41    7  26SEP   2022 Right 1 Explanted   IMP PLATE SYN LCP LAT DIST FIB 2.7/3.5X86MM 4H RT 02.112.138 - [id number] Metallic Hardware/[place] IMP PLATE SYN LCP LAT DIST FIB 2.7/3.5X86MM 4H RT 02.112.138  [employer] 42   02   [id number]  22 Right 1 Explanted   IMP SCR SYN 2.7X18MM T8 SD LOCKING SELF-TAP VA 02.211.018 - [id number] Metallic Hardware/[place] IMP SCR SYN 2.7X18MM T8 SD LOCKING SELF-TAP VA 02.211.018  [employer] 41    7  26SEP   2022 Right 2 Explanted   IMP SCR SYN 2.7X20MM T8 SD LOCKING SELF-TAP VA 02.211.020 - [id number] Metallic Hardware/[place] IMP SCR SYN 2.7X20MM T8 SD LOCKING SELF-TAP VA 02.211.020  [employer] 41    7  26SEP   2022 Right 1 Explanted   IMP SCR SYN 2.7X34MM T8 SD LOCKING SELF-TAP VA 02.211.034 - [id number] Metallic Hardware/[place] IMP SCR SYN 2.7X34MM T8 SD  LOCKING SELF-TAP VA 02.211.034  [employer] 41    7  26SEP   2022 Right 1 Explanted   IMP SCR SYN CORTEX 3.5X16MM SELF TAP .816 - [id number] Metallic Hardware/[place] IMP SCR SYN CORTEX 3.5X16MM SELF TAP .816  [employer] 42   04  19SEP  22 Right 1 Explanted   IMP SCR SYN CORTEX 3.5X18MM SELF TAP .818 - [id number] Metallic Hardware/[place] IMP SCR SYN CORTEX 3.5X18MM SELF TAP .818  [employer] 42   04  19SEP  22 Right 2 Explanted   IMP SCR SYN CORTEX 3.5X24MM SELF TAP .824 - [id number] Metallic Hardware/[place] IMP SCR SYN CORTEX 3.5X24MM SELF TAP .824  [employer] 42   04  19SEP  22 Right 1 Explanted   IMP SCR SYN CORTEX 3.5X26MM SELF TAP .826 - [id number] Metallic Hardware/[place] IMP SCR SYN CORTEX 3.5X26MM SELF TAP .826  [employer] 42   04  19SEP  22 Right 2 Explanted   IMP SCR SYN CORTEX 3.5X28MM SELF TAP .828 - [id number] Metallic Hardware/[place] IMP SCR SYN CORTEX 3.5X28MM SELF TAP .828  [employer] 42   04  19SEP  22 Right 1 Explanted   IMP SCR SYN CORTEX 3.5X32MM SELF TAP .832 - [id number] Metallic Hardware/[place] IMP SCR SYN CORTEX 3.5X32MM SELF TAP .832  [employer] 42   04  19SEP  22 Right 1 Explanted   IMP SCR SYN CORTEX 3.5X40MM SELF TAP .840 - [id number] Metallic Hardware/[place] IMP SCR SYN CORTEX 3.5X40MM SELF TAP .840  [employer] 42   04  19SEP  22 Right 1 Explanted   IMP SCR SYN LOCKING 3.5X14MM W/T15 STARDRIVE 02.271.114 - [id number] Metallic Hardware/[place] IMP SCR SYN LOCKING 3.5X14MM W/T15 STARDRIVE 02.271.114  [employer] 41    7  26SEP   2022 Right 1 Explanted   IMP SCR SYN SELF TAPPING 2.7X28MM W/STARDRIVE 02.118.528 - [id number] Metallic Hardware/[place] IMP SCR SYN SELF TAPPING 2.7X28MM W/STARDRIVE 02.118.528  [facility] 41    7  26SEP   2022 Right 1 Explanted   SCREW STARDRIVE W/T15 3.5X28MM - [id number] Metallic Hardware/[place] SCREW STARDRIVE W/T15 3.5X28MM   [employer] 41    7  26SEP   2022 Right 1 Explanted   SCREW STARDRIVE W/T15 3.5X26MM - [id number] Metallic Hardware/[place] SCREW STARDRIVE W/T15 3.5X26MM  [employer] 41    7  26SEP   2022 Right 1 Explanted   SCREW METAPHYSEAL ST 2.7X18MM - [id number] Metallic Hardware/[place] SCREW METAPHYSEAL ST 2.7X18MM  [employer] 41    7  26SEP   2022 Right 1 Explanted   SCREW STARDRIVE W/T15 3.5X42MM - [id number] Metallic Hardware/[place] SCREW STARDRIVE W/T15 3.5X42MM  [employer] 41    7  26SEP   2022 Right 1 Explanted   SCREW LOCKING ST 2.7X38MM - [id number] Metallic Hardware/[place] SCREW LOCKING ST 2.7X38MM  [employer] 41    7  26SEP   2022 Right 1 Explanted   SCREW LOCKING ST 2.7X40MM - [id number] Metallic Hardware/[place] SCREW LOCKING ST 2.7X40MM  [employer] 41    7  26SEP   2022 Right 2 Explanted

## 2023-03-18 LAB
BACTERIA TISS BX CULT: ABNORMAL
BACTERIA TISS BX CULT: ABNORMAL

## 2023-03-19 LAB — BACTERIA TISS BX CULT: ABNORMAL

## 2023-03-20 LAB
BACTERIA TISS BX CULT: NORMAL
BACTERIA TISS BX CULT: NORMAL

## 2023-03-24 NOTE — ADDENDUM NOTE
Addendum  created 03/24/23 0734 by Pedro Murillo DO    Attestation recorded in Intraprocedure, Clinical Note Signed, Intraprocedure Attestations filed, Intraprocedure Event edited

## 2023-03-24 NOTE — ANESTHESIA POSTPROCEDURE EVALUATION
Patient: Leanne Valentino    Procedure: Procedure(s):  Hardware removal, right distal tibia and fibula       Anesthesia Type:  General    Note:     Postop Pain Control: Uneventful            Sign Out: Well controlled pain   PONV: No   Neuro/Psych: Uneventful            Sign Out: Acceptable/Baseline neuro status   Airway/Respiratory:             Sign Out: Acceptable/Baseline resp. status   CV/Hemodynamics:             Sign Out: Acceptable CV status   Other NRE:    DID A NON-ROUTINE EVENT OCCUR?     Event details/Postop Comments:  Late entry, ro           Last vitals:  Vitals Value Taken Time   /73 03/13/23 1100   Temp 98.3  F (36.8  C) 03/13/23 1100   Pulse 68 03/13/23 1100   Resp     SpO2 100 % 03/13/23 1100       Electronically Signed By: Pedro Murillo DO  March 24, 2023  7:31 AM

## 2023-04-22 ENCOUNTER — HEALTH MAINTENANCE LETTER (OUTPATIENT)
Age: 58
End: 2023-04-22

## 2023-07-03 ENCOUNTER — OFFICE VISIT (OUTPATIENT)
Dept: FAMILY MEDICINE | Facility: CLINIC | Age: 58
End: 2023-07-03
Payer: COMMERCIAL

## 2023-07-03 VITALS
BODY MASS INDEX: 23.21 KG/M2 | DIASTOLIC BLOOD PRESSURE: 84 MMHG | TEMPERATURE: 98.1 F | SYSTOLIC BLOOD PRESSURE: 136 MMHG | RESPIRATION RATE: 18 BRPM | HEART RATE: 86 BPM | OXYGEN SATURATION: 97 % | HEIGHT: 63 IN | WEIGHT: 131 LBS

## 2023-07-03 DIAGNOSIS — Z12.11 SCREEN FOR COLON CANCER: ICD-10-CM

## 2023-07-03 DIAGNOSIS — F41.9 ANXIETY: ICD-10-CM

## 2023-07-03 DIAGNOSIS — Z00.00 ANNUAL PHYSICAL EXAM: Primary | ICD-10-CM

## 2023-07-03 DIAGNOSIS — Z12.4 CERVICAL CANCER SCREENING: ICD-10-CM

## 2023-07-03 DIAGNOSIS — F17.200 TOBACCO USE DISORDER: ICD-10-CM

## 2023-07-03 DIAGNOSIS — R21 RASH: ICD-10-CM

## 2023-07-03 DIAGNOSIS — Z12.31 VISIT FOR SCREENING MAMMOGRAM: ICD-10-CM

## 2023-07-03 PROCEDURE — 99396 PREV VISIT EST AGE 40-64: CPT | Performed by: INTERNAL MEDICINE

## 2023-07-03 PROCEDURE — 99214 OFFICE O/P EST MOD 30 MIN: CPT | Mod: 25 | Performed by: INTERNAL MEDICINE

## 2023-07-03 PROCEDURE — 87624 HPV HI-RISK TYP POOLED RSLT: CPT | Performed by: INTERNAL MEDICINE

## 2023-07-03 PROCEDURE — G0145 SCR C/V CYTO,THINLAYER,RESCR: HCPCS | Performed by: INTERNAL MEDICINE

## 2023-07-03 RX ORDER — CITALOPRAM HYDROBROMIDE 20 MG/1
20 TABLET ORAL DAILY
Qty: 90 TABLET | Refills: 1 | Status: SHIPPED | OUTPATIENT
Start: 2023-07-03 | End: 2023-10-03

## 2023-07-03 ASSESSMENT — ENCOUNTER SYMPTOMS
NERVOUS/ANXIOUS: 1
FEVER: 0
EYE PAIN: 0
ABDOMINAL PAIN: 0
NAUSEA: 0
DIARRHEA: 0
COUGH: 0
DYSURIA: 0
SHORTNESS OF BREATH: 0
JOINT SWELLING: 0
PALPITATIONS: 0
ARTHRALGIAS: 0
CONSTIPATION: 0
HEADACHES: 0
HEMATOCHEZIA: 0
PARESTHESIAS: 0
HEARTBURN: 0
FREQUENCY: 0
SORE THROAT: 0
BREAST MASS: 0
MYALGIAS: 0
CHILLS: 0
DIZZINESS: 0
HEMATURIA: 0
WEAKNESS: 0

## 2023-07-03 ASSESSMENT — PATIENT HEALTH QUESTIONNAIRE - PHQ9
SUM OF ALL RESPONSES TO PHQ QUESTIONS 1-9: 0
SUM OF ALL RESPONSES TO PHQ QUESTIONS 1-9: 0
10. IF YOU CHECKED OFF ANY PROBLEMS, HOW DIFFICULT HAVE THESE PROBLEMS MADE IT FOR YOU TO DO YOUR WORK, TAKE CARE OF THINGS AT HOME, OR GET ALONG WITH OTHER PEOPLE: NOT DIFFICULT AT ALL

## 2023-07-03 ASSESSMENT — PAIN SCALES - GENERAL: PAINLEVEL: NO PAIN (0)

## 2023-07-03 NOTE — PROGRESS NOTES
SUBJECTIVE:   CC: Leanne is an 58 year old who presents for preventive health visit.     Healthy Habits:     Getting at least 3 servings of Calcium per day:  Yes    Bi-annual eye exam:  Yes    Dental care twice a year:  Yes    Sleep apnea or symptoms of sleep apnea:  None    Diet:  Regular (no restrictions)    Frequency of exercise:  None    Taking medications regularly:  Yes    Medication side effects:  Not applicable    Additional concerns today:  No    Leanne is a very pleasant 58-year-old lady who presented to the clinic to establish care and for annual physical exam.  Smoking: since she was 27 years old, smoking for 30 years, 0.5 pack per day, trying to cut back, has nicotine gum prescription, will start using soon.     Anxiety: Patient reports that her anxiety is getting worse.  Hydroxyzine only works temporarily for symptom relief.  She has been on Celexa for depression in the past and tolerated it well.  Reports that many members in the family have anxiety.    Due for mammogram, colon cancer screening, pap and shingles vaccine.     She has right leg bandage, reports that she had fracture last year with hardware getting infected and currently seeing plastic surgery for skin repair.    Today's PHQ-9 Score:       7/3/2023     9:17 AM   PHQ-9 SCORE   PHQ-9 Total Score MyChart 0   PHQ-9 Total Score 0     Have you ever done Advance Care Planning? (For example, a Health Directive, POLST, or a discussion with a medical provider or your loved ones about your wishes): No, advance care planning information given to patient to review.  Patient declined advance care planning discussion at this time.    Social History     Tobacco Use     Smoking status: Every Day     Packs/day: 0.50     Years: 18.00     Pack years: 9.00     Types: Cigarettes     Smokeless tobacco: Never   Substance Use Topics     Alcohol use: Yes     Comment: once or twice weekly           7/3/2023     9:19 AM   Alcohol Use   Prescreen: >3  drinks/day or >7 drinks/week? No      Reviewed orders with patient.  Reviewed health maintenance and updated orders accordingly - Yes  Lab work is in process    Breast Cancer Screening:        3/8/2023     1:41 PM   Breast CA Risk Assessment (FHS-7)   Do you have a family history of breast, colon, or ovarian cancer? No / Unknown       Mammogram Screening: Recommended mammography every 1-2 years with patient discussion and risk factor consideration  Pertinent mammograms are reviewed under the imaging tab.    History of abnormal Pap smear: NO - age 30-65 PAP every 5 years with negative HPV co-testing recommended      7/13/2010    12:00 AM 5/21/2009    12:00 AM 4/7/2008    12:00 AM   PAP / HPV   PAP (Historical) NIL  NIL  ASC-US      Reviewed and updated as needed this visit by clinical staff   Tobacco  Allergies  Meds     Fam Hx          Reviewed and updated as needed this visit by Provider     Meds     Fam Hx         Review of Systems   Constitutional: Negative for chills and fever.   HENT: Negative for congestion, ear pain, hearing loss and sore throat.    Eyes: Negative for pain and visual disturbance.   Respiratory: Negative for cough and shortness of breath.    Cardiovascular: Negative for chest pain, palpitations and peripheral edema.   Gastrointestinal: Negative for abdominal pain, constipation, diarrhea, heartburn, hematochezia and nausea.   Breasts:  Negative for tenderness, breast mass and discharge.   Genitourinary: Negative for dysuria, frequency, genital sores, hematuria, pelvic pain, urgency, vaginal bleeding and vaginal discharge.   Musculoskeletal: Negative for arthralgias, joint swelling and myalgias.   Skin: Positive for rash.   Neurological: Negative for dizziness, weakness, headaches and paresthesias.   Psychiatric/Behavioral: Negative for mood changes. The patient is nervous/anxious.         OBJECTIVE:   /84 (BP Location: Right arm, Patient Position: Sitting, Cuff Size: Adult Regular)   " Pulse 86   Temp 98.1  F (36.7  C) (Oral)   Resp 18   Ht 1.6 m (5' 3\")   Wt 59.4 kg (131 lb)   LMP 06/20/2012   SpO2 97%   BMI 23.21 kg/m    Physical Exam  Vitals reviewed.   Constitutional:       Appearance: Normal appearance.   HENT:      Right Ear: Tympanic membrane normal. There is no impacted cerumen.      Left Ear: Tympanic membrane normal. There is no impacted cerumen.      Mouth/Throat:      Mouth: Mucous membranes are moist.      Pharynx: Oropharynx is clear. No oropharyngeal exudate or posterior oropharyngeal erythema.   Cardiovascular:      Rate and Rhythm: Normal rate and regular rhythm.      Heart sounds: Normal heart sounds. No murmur heard.     No gallop.   Pulmonary:      Effort: Pulmonary effort is normal. No respiratory distress.      Breath sounds: Normal breath sounds. No stridor. No wheezing, rhonchi or rales.   Chest:   Breasts:     Right: No swelling, bleeding, inverted nipple, mass, nipple discharge, skin change or tenderness.      Left: No swelling, bleeding, inverted nipple, mass, nipple discharge, skin change or tenderness.   Abdominal:      General: Abdomen is flat. There is no distension.      Palpations: Abdomen is soft. There is no mass.      Tenderness: There is no abdominal tenderness. There is no guarding.      Hernia: No hernia is present.   Genitourinary:     Vagina: Normal.      Cervix: Normal.   Musculoskeletal:         General: Normal range of motion.      Cervical back: Normal range of motion and neck supple. No rigidity or tenderness.      Right lower leg: No edema.      Left lower leg: No edema.   Lymphadenopathy:      Cervical: No cervical adenopathy.   Skin:     General: Skin is warm and dry.   Neurological:      General: No focal deficit present.      Mental Status: She is alert.   Psychiatric:         Mood and Affect: Mood normal.       ASSESSMENT/PLAN:   Leanne was seen today for physical.    Diagnoses and all orders for this visit:    Annual physical " exam    Screen for colon cancer  -     Colonoscopy Screening  Referral; Future    Visit for screening mammogram  -     MA SCREENING DIGITAL BILAT - Future  (s+30); Future    Cervical cancer screening  -     Pap Screen with HPV - recommended age 30 - 65 years    Anxiety  We will follow-up in 3 months  -     citalopram (CELEXA) 20 MG tablet; Take 1 tablet (20 mg) by mouth daily  -     PRIMARY CARE FOLLOW-UP SCHEDULING; Future    Rash  Rash on her neck appeared after using a new face serum.  Patient stopped using the syndrome immediately.  She reports nonspecific rash appearing on her arms and her neck.  She is using 1% hydrocortisone without much relief.  No new food, clothing or product used.  Refer to allergist for evaluation  -     Adult Allergy/Asthma Referral; Future    Tobacco use disorder  Trying to quit smoking.  Nicotine gum prescription was sent 3 months ago.  Patient will refill it and start using it.      Patient has been advised of split billing requirements and indicates understanding: Yes      COUNSELING:  Reviewed preventive health counseling, as reflected in patient instructions       Colorectal Cancer Screening      She reports that she has been smoking cigarettes. She has a 9.00 pack-year smoking history. She has never used smokeless tobacco.  Nicotine/Tobacco Cessation Plan:   Pharmacotherapies : Nicotine gum      JEROME BARRETT MD  [facility]

## 2023-07-03 NOTE — PATIENT INSTRUCTIONS
Vitamin D3 - take 1000 international unit(s) daily     You are due for mammogram.  Please call the following number to make appointment :  [phone]  It is located in [address]    There is this is a new shingles vaccine available called shingrex  It is a series of 2 shots 2-6 months apart.  Considered more than 90% effective.  Please go to any pharmacy to get the  vaccine

## 2023-07-07 LAB
BKR LAB AP GYN ADEQUACY: NORMAL
BKR LAB AP GYN INTERPRETATION: NORMAL
BKR LAB AP HPV REFLEX: NORMAL
BKR LAB AP PREVIOUS ABNORMAL: NORMAL
PATH REPORT.COMMENTS IMP SPEC: NORMAL
PATH REPORT.COMMENTS IMP SPEC: NORMAL
PATH REPORT.RELEVANT HX SPEC: NORMAL

## 2023-07-10 LAB
HUMAN PAPILLOMA VIRUS 16 DNA: NEGATIVE
HUMAN PAPILLOMA VIRUS 18 DNA: NEGATIVE
HUMAN PAPILLOMA VIRUS FINAL DIAGNOSIS: NORMAL
HUMAN PAPILLOMA VIRUS OTHER HR: NEGATIVE

## 2023-07-13 ENCOUNTER — HOSPITAL ENCOUNTER (OUTPATIENT)
Dept: MAMMOGRAPHY | Facility: CLINIC | Age: 58
Discharge: HOME | End: 2023-07-13
Attending: INTERNAL MEDICINE | Admitting: INTERNAL MEDICINE
Payer: COMMERCIAL

## 2023-07-13 DIAGNOSIS — Z12.31 VISIT FOR SCREENING MAMMOGRAM: ICD-10-CM

## 2023-07-13 PROCEDURE — 77067 SCR MAMMO BI INCL CAD: CPT

## 2023-07-24 ENCOUNTER — ANCILLARY ORDERS (OUTPATIENT)
Dept: RADIOLOGY | Facility: CLINIC | Age: 58
End: 2023-07-24

## 2023-08-31 ENCOUNTER — OFFICE VISIT (OUTPATIENT)
Dept: ALLERGY | Facility: CLINIC | Age: 58
End: 2023-08-31
Attending: INTERNAL MEDICINE
Payer: COMMERCIAL

## 2023-08-31 VITALS
OXYGEN SATURATION: 100 % | SYSTOLIC BLOOD PRESSURE: 148 MMHG | BODY MASS INDEX: 22.27 KG/M2 | WEIGHT: 125.7 LBS | DIASTOLIC BLOOD PRESSURE: 82 MMHG | HEART RATE: 84 BPM

## 2023-08-31 DIAGNOSIS — R21 RASH: ICD-10-CM

## 2023-08-31 DIAGNOSIS — R09.89 RUNNY NOSE: Primary | ICD-10-CM

## 2023-08-31 DIAGNOSIS — L29.89 ITCHY NOSE: ICD-10-CM

## 2023-08-31 PROCEDURE — 99203 OFFICE O/P NEW LOW 30 MIN: CPT | Mod: 25 | Performed by: INTERNAL MEDICINE

## 2023-08-31 PROCEDURE — 95004 PERQ TESTS W/ALRGNC XTRCS: CPT | Performed by: INTERNAL MEDICINE

## 2023-08-31 RX ORDER — HYDROCORTISONE 10 MG/G
CREAM TOPICAL 2 TIMES DAILY PRN
COMMUNITY

## 2023-08-31 RX ORDER — DIPHENHYDRAMINE HCL 25 MG
25 CAPSULE ORAL EVERY 6 HOURS PRN
COMMUNITY
End: 2024-01-31

## 2023-08-31 ASSESSMENT — ENCOUNTER SYMPTOMS
EYE REDNESS: 0
COUGH: 0
VOMITING: 0
ARTHRALGIAS: 0
RHINORRHEA: 1
EYE DISCHARGE: 0
SINUS PRESSURE: 0
ACTIVITY CHANGE: 0
SHORTNESS OF BREATH: 0
NAUSEA: 0
JOINT SWELLING: 0
CHILLS: 0
FEVER: 0
ADENOPATHY: 0
EYE ITCHING: 1
DIARRHEA: 0
FACIAL SWELLING: 0
MYALGIAS: 0
WHEEZING: 0
CHEST TIGHTNESS: 0
HEADACHES: 0

## 2023-08-31 NOTE — PROGRESS NOTES
Verbal consent was obtained prior to procedure by the provider. Per provider verbal order, placed Adult Environmental Panel scratch test(s). Once antigens were placed, patient was monitored for 15 minutes in clinic. Provider read test after 15 minutes. Patient tolerated procedure well. All questions and concerns were addressed at office visit by the provider.     ROSA KuoN, RN

## 2023-08-31 NOTE — PATIENT INSTRUCTIONS
Try Zyrtec 10 mg as needed (if rash returns).  May also use for nasal symptoms.  Other options include Flonase 1-2 sprays each nostril daily  Will check labs      Allergy Staff Appt Hours Shot Hours Location       Physician   Sagar Ballard MD      Support Staff   Sharonda MUNGUIA, RN   Miguel PERES, RN   [place], MA   Vaughn SRIVASTAVA, LPN      Mondays Tuesdays Thursdays and Fridays:  [place] 7-5 Wednesdays  Close                Mondays, Tuesdays and Fridays:  7:20 - 3:40              [facility]  [address]  [place] [address]  Appt Line: [phone]    Pulmonary Function Scheduling:  [place]: [phone]

## 2023-08-31 NOTE — PROGRESS NOTES
Leanne Valentino was seen in the Allergy Clinic at [facility].    Leanne Valentino is a 58 year old female being seen today at the request of Tamy Mc MD/[facility] in consultation for Rash.    She developed a rash on her neck as well as arms.  After showing photos from the Internet it appears that it was more eczematous rather than hives.  It did itch and burn.  Benadryl and cortisone did seem to provide benefit.  Initially there was some concern about a skin serum that she was placing on her neck however she also placed that on her face and did not place it on her arm so this seems less likely.  She stopped using that sample and went back to her original product.    The rash lasted approximately 3 weeks.  It is now resolved for over 3 weeks.    She also has symptoms of rhinorrhea and itching of her nose.  She would like to find out if she has an environmental allergy.  Dust and being outdoors as well as the air conditioning seem to trigger the symptoms.    She is previously known to have problems with hair dye as well as perfume and other fragrances.  She uses all unscented products.      Past Medical History:   Diagnosis Date    Adjustment disorder with mixed anxiety and depressed mood     Leiomyoma of uterus, unspecified      Family History   Problem Relation Age of Onset    Hypertension Mother     Arthritis Mother     Depression Mother     Hypothyroidism Mother     Hypertension Father     Eye Disorder Father         cataracts    Lipids Father     Heart Disease Father         heart failure    Cerebrovascular Disease Paternal Grandmother      Past Surgical History:   Procedure Laterality Date    HC LAPAROSCOPIC MYOMECTOMY, 1 - 4 INTRAMURAL MYOMAS =<250 GM  09/2006    laparoscopic, pedunculated    IRRIGATION AND DEBRIDEMENT KNEE, COMBINED Right 12/15/2022    Procedure: Open irrigation and debridement of right distal tibia surgical wound with placement of  vacuum-assisted wound closure device.;  Surgeon: Raj Gallardo MD;  Location: [facility] OR    LAMINECT/DISCECTOMY, LUMBAR  11/15/11    L4-L5    OPEN REDUCTION INTERNAL FIXATION TIBIA Right 9/29/2022    Procedure: OPEN REDUCTION INTERNAL FIXATION RIGHT TIBIA.;  Surgeon: Raj Gallardo MD;  Location: [facility] OR    REMOVE HARDWARE ANKLE Right 3/13/2023    Procedure: Hardware removal, right distal tibia and fibula;  Surgeon: Raj Gallardo MD;  Location: [facility] OR    ZZC APPENDECTOMY,RUPT APPENDX+ABSCESS  08/1989       ENVIRONMENTAL HISTORY:   Pets inside the house include 1 dog(s).  Do you smoke cigarettes or other recreational drugs? No There is/are 1 smokers living in the house. The house does not have a damp basement.     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      Review of Systems   Constitutional:  Negative for activity change, chills and fever.   HENT:  Positive for rhinorrhea. Negative for congestion, dental problem, ear pain, facial swelling, nosebleeds, postnasal drip, sinus pressure and sneezing.    Eyes:  Positive for itching. Negative for discharge and redness.   Respiratory:  Negative for cough, chest tightness, shortness of breath and wheezing.    Cardiovascular:  Negative for chest pain.   Gastrointestinal:  Negative for diarrhea, nausea and vomiting.   Musculoskeletal:  Negative for arthralgias, joint swelling and myalgias.   Skin:  Negative for rash.   Neurological:  Negative for headaches.   Hematological:  Negative for adenopathy.   Psychiatric/Behavioral:  Negative for behavioral problems and self-injury.          Current Outpatient Medications:     citalopram (CELEXA) 20 MG tablet, Take 1 tablet (20 mg) by mouth daily, Disp: 90 tablet, Rfl: 1    COLLAGEN PO, Take 1 tablet by mouth daily, Disp: , Rfl:     diphenhydrAMINE (BENADRYL) 25 MG capsule, Take 25 mg by mouth every 6 hours as needed for itching or allergies, Disp: , Rfl:     fish  oil-omega-3 fatty acids 1000 MG capsule, Take 1 g by mouth daily, Disp: , Rfl:     hydrocortisone 1 % CREA cream, Place rectally 2 times daily as needed for itching, Disp: , Rfl:     hydrOXYzine (ATARAX) 25 MG tablet, Take 1 tablet (25 mg) by mouth 2 times daily as needed for anxiety, Disp: 20 tablet, Rfl: 1    hypromellose (GENTEAL) 0.3 % SOLN ophthalmic solution, Place 1 drop into both eyes every hour as needed for dry eyes, Disp: , Rfl:     multivitamin, therapeutic (THERA-VIT) TABS tablet, Take 1 tablet by mouth daily, Disp: , Rfl:     nicotine (NICORETTE) 2 MG gum, Place 1 each (2 mg) inside cheek every hour as needed for smoking cessation, Disp: 100 each, Rfl: 1  Allergies   Allergen Reactions    Sumatriptan Other (See Comments)     Other reaction(s): Tachycardia  tachycardia      Sulfa Antibiotics Rash         EXAM:   BP (!) 148/82   Pulse 84   Wt 57 kg (125 lb 11.2 oz)   LMP 06/20/2012   SpO2 100%   BMI 22.27 kg/m      Physical Exam    Constitutional:       General: She is not in acute distress.     Appearance: Normal appearance. She is not ill-appearing.   HENT:      Head: Normocephalic and atraumatic.      Nose: Nasal septum is deviated to the right.     Mouth/Throat:      Mouth: Mucous membranes are moist.      Pharynx: Oropharynx is clear. No posterior oropharyngeal erythema.   Eyes:      General:         Right eye: No discharge.         Left eye: No discharge.   Cardiovascular:      Rate and Rhythm: Normal rate and regular rhythm.      Heart sounds: Normal heart sounds.   Pulmonary:      Effort: Pulmonary effort is normal.      Breath sounds: Normal breath sounds. No wheezing or rhonchi.   Skin:     General: Skin is warm.      Findings: No erythema or rash.   Neurological:      General: No focal deficit present.      Mental Status: She is alert. Mental status is at baseline.   Psychiatric:         Mood and Affect: Mood normal.         Behavior: Behavior normal.      WORKUP: Skin testing was  negative.    ENVIRONMENTAL PERCUTANEOUS SKIN TESTING: ADULT      8/31/2023     8:30 AM   [facility]   Consent Y   Ordering Physician Elio   Interpreting Physician Elio   Testing Technician Miguel BLEDSOE RN   Location Back   Time start: 08:30   Time End: 08:45   Positive Control: Histatrol*ALK 1 mg/ml 9/20   Negative Control: 50% Glycerin 0   Cat Hair*ALK (10,000 BAU/ml) 0   AP Dog Hair/Dander (1:100 w/v) 0   Dust Mite p. 30,000 AU/ml 0   Dust Mite f. (30,000 AU/ml) 0   Kavin (W/F in millimeters) 0   [place] (100,000 BAU/mL) 0   Red Cedar (W/F in millimeters) 0   Maple/[place] (W/F in millimeters) 0   [place]berry (W/F in millimeters) 0   [place] (W/F in millimeters) 0   [place] *ALK (W/F in millimeters) 0   American Elm (W/F in millimeters) 0   [place] (W/F in millimeters) 0   Black [place] (W/F in millimeters) 0   Birch Mix (W/F in millimeters) 0   [place] (W/F in millimeters) 0   Oak (W/F in millimeters) 0   [place]ockle[place] (W/F in millimeters) 0   [place] (W/F in millimeters) 0   White [place] (W/F in millimeters) 0   Careless (W/F in millimeters) 0   Nettle (W/F in millimeters) 0   English Plantain (W/F in millimeters) 0   Kochia (W/F in millimeters) 0   Lamb's Quarter (W/F in millimeters) 0   Marshelder (W/F in millimeters) 0   Ragweed Mix* ALK (W/F in millimeters) 0   Russian Thistle (W/F in millimeters) 0   Sagebrush/Mugwort (W/F in millimeters) 0   Sheep Sorrel (W/F in millimeters) 0   Feather Mix* ALK (W/F in millimeters) 0   Penicillium Mix (1:10 w/v) 0   Curvularia spicifera (1:10 w/v) 0   Epicoccum (1:10 w/v) 0   Aspergillus fumigatus (1:10 w/v): 0   Alternaria tenius (1:10 w/v) 0   H. Cladosporium (1:10 w/v) 0   Phoma herbarum (1:10 w/v) 0       Verbal consent obtained for skin testing  ASSESSMENT/PLAN:  Leanne Valentino is a 58 year old female seen today for evaluation of a rash which has resolved.  Also concern for seasonal allergy symptoms including rhinorrhea and itching of her nose.   Skin testing was negative.  I am uncertain of the etiology of the rash.     Try Zyrtec 10 mg as needed (if rash returns).  May also use for nasal symptoms.  May use topical steroids as needed also if the rash does return.  Other options include Flonase 1-2 sprays each nostril daily for her nasal symptoms.  Will check labs assess for allergy is not detected by skin testing    Follow-up as needed      Thank you for allowing me to participate in the care of Leanne Valentino.      I spent 30 minutes on the date of the encounter doing chart review, history and exam, documentation and further coordination as noted above exclusive of separately reported interpretations    Sagar Ballard MD  Allergy/Immunology  [facility]

## 2023-08-31 NOTE — LETTER
8/31/2023         RE: Leanne Valentino  [address]  [place] [place] [address]        Dear Colleague,    Thank you for referring your patient, Leanne Valentino, to the [facility]. Please see a copy of my visit note below.    Leanne Valentino was seen in the Allergy Clinic at [facility].    Leanne Valentino is a 58 year old female being seen today at the request of Tamy Mc MD/[facility] in consultation for Rash.    She developed a rash on her neck as well as arms.  After showing photos from the Internet it appears that it was more eczematous rather than hives.  It did itch and burn.  Benadryl and cortisone did seem to provide benefit.  Initially there was some concern about a skin serum that she was placing on her neck however she also placed that on her face and did not place it on her arm so this seems less likely.  She stopped using that sample and went back to her original product.    The rash lasted approximately 3 weeks.  It is now resolved for over 3 weeks.    She also has symptoms of rhinorrhea and itching of her nose.  She would like to find out if she has an environmental allergy.  Dust and being outdoors as well as the air conditioning seem to trigger the symptoms.    She is previously known to have problems with hair dye as well as perfume and other fragrances.  She uses all unscented products.      Past Medical History:   Diagnosis Date     Adjustment disorder with mixed anxiety and depressed mood      Leiomyoma of uterus, unspecified      Family History   Problem Relation Age of Onset     Hypertension Mother      Arthritis Mother      Depression Mother      Hypothyroidism Mother      Hypertension Father      Eye Disorder Father         cataracts     Lipids Father      Heart Disease Father         heart failure     Cerebrovascular Disease Paternal Grandmother      Past Surgical History:   Procedure Laterality Date     HC  LAPAROSCOPIC MYOMECTOMY, 1 - 4 INTRAMURAL MYOMAS =<250 GM  09/2006    laparoscopic, pedunculated     IRRIGATION AND DEBRIDEMENT KNEE, COMBINED Right 12/15/2022    Procedure: Open irrigation and debridement of right distal tibia surgical wound with placement of vacuum-assisted wound closure device.;  Surgeon: Raj Gallardo MD;  Location: [facility] OR     LAMINECT/DISCECTOMY, LUMBAR  11/15/11    L4-L5     OPEN REDUCTION INTERNAL FIXATION TIBIA Right 9/29/2022    Procedure: OPEN REDUCTION INTERNAL FIXATION RIGHT TIBIA.;  Surgeon: Raj Gallardo MD;  Location: [facility] OR     REMOVE HARDWARE ANKLE Right 3/13/2023    Procedure: Hardware removal, right distal tibia and fibula;  Surgeon: Raj Gallardo MD;  Location: [facility] OR     ZZC APPENDECTOMY,RUPT APPENDX+ABSCESS  08/1989       ENVIRONMENTAL HISTORY:   Pets inside the house include 1 dog(s).  Do you smoke cigarettes or other recreational drugs? No There is/are 1 smokers living in the house. The house does not have a damp basement.     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      Review of Systems   Constitutional:  Negative for activity change, chills and fever.   HENT:  Positive for rhinorrhea. Negative for congestion, dental problem, ear pain, facial swelling, nosebleeds, postnasal drip, sinus pressure and sneezing.    Eyes:  Positive for itching. Negative for discharge and redness.   Respiratory:  Negative for cough, chest tightness, shortness of breath and wheezing.    Cardiovascular:  Negative for chest pain.   Gastrointestinal:  Negative for diarrhea, nausea and vomiting.   Musculoskeletal:  Negative for arthralgias, joint swelling and myalgias.   Skin:  Negative for rash.   Neurological:  Negative for headaches.   Hematological:  Negative for adenopathy.   Psychiatric/Behavioral:  Negative for behavioral problems and self-injury.          Current Outpatient Medications:      citalopram (CELEXA) 20 MG  tablet, Take 1 tablet (20 mg) by mouth daily, Disp: 90 tablet, Rfl: 1     COLLAGEN PO, Take 1 tablet by mouth daily, Disp: , Rfl:      diphenhydrAMINE (BENADRYL) 25 MG capsule, Take 25 mg by mouth every 6 hours as needed for itching or allergies, Disp: , Rfl:      fish oil-omega-3 fatty acids 1000 MG capsule, Take 1 g by mouth daily, Disp: , Rfl:      hydrocortisone 1 % CREA cream, Place rectally 2 times daily as needed for itching, Disp: , Rfl:      hydrOXYzine (ATARAX) 25 MG tablet, Take 1 tablet (25 mg) by mouth 2 times daily as needed for anxiety, Disp: 20 tablet, Rfl: 1     hypromellose (GENTEAL) 0.3 % SOLN ophthalmic solution, Place 1 drop into both eyes every hour as needed for dry eyes, Disp: , Rfl:      multivitamin, therapeutic (THERA-VIT) TABS tablet, Take 1 tablet by mouth daily, Disp: , Rfl:      nicotine (NICORETTE) 2 MG gum, Place 1 each (2 mg) inside cheek every hour as needed for smoking cessation, Disp: 100 each, Rfl: 1  Allergies   Allergen Reactions     Sumatriptan Other (See Comments)     Other reaction(s): Tachycardia  tachycardia       Sulfa Antibiotics Rash         EXAM:   BP (!) 148/82   Pulse 84   Wt 57 kg (125 lb 11.2 oz)   LMP 06/20/2012   SpO2 100%   BMI 22.27 kg/m      Physical Exam    Constitutional:       General: She is not in acute distress.     Appearance: Normal appearance. She is not ill-appearing.   HENT:      Head: Normocephalic and atraumatic.      Nose: Nasal septum is deviated to the right.     Mouth/Throat:      Mouth: Mucous membranes are moist.      Pharynx: Oropharynx is clear. No posterior oropharyngeal erythema.   Eyes:      General:         Right eye: No discharge.         Left eye: No discharge.   Cardiovascular:      Rate and Rhythm: Normal rate and regular rhythm.      Heart sounds: Normal heart sounds.   Pulmonary:      Effort: Pulmonary effort is normal.      Breath sounds: Normal breath sounds. No wheezing or rhonchi.   Skin:     General: Skin is warm.       Findings: No erythema or rash.   Neurological:      General: No focal deficit present.      Mental Status: She is alert. Mental status is at baseline.   Psychiatric:         Mood and Affect: Mood normal.         Behavior: Behavior normal.      WORKUP: Skin testing was negative.    ENVIRONMENTAL PERCUTANEOUS SKIN TESTING: ADULT      8/31/2023     8:30 AM   [facility]   Consent Y   Ordering Physician Elio   Interpreting Physician Elio   Testing Technician Miguel BLEDSOE RN   Location Back   Time start: 08:30   Time End: 08:45   Positive Control: Histatrol*ALK 1 mg/ml 9/20   Negative Control: 50% Glycerin 0   Cat Hair*ALK (10,000 BAU/ml) 0   AP Dog Hair/Dander (1:100 w/v) 0   Dust Mite p. 30,000 AU/ml 0   Dust Mite f. (30,000 AU/ml) 0   [place]avin (W/F in millimeters) 0   [place] (100,000 BAU/mL) 0   Red Cedar (W/F in millimeters) 0   Maple/[place] (W/F in millimeters) 0   [place]berry (W/F in millimeters) 0   [place] (W/F in millimeters) 0   [place] *ALK (W/F in millimeters) 0   American Elm (W/F in millimeters) 0   [place] (W/F in millimeters) 0   Black [place] (W/F in millimeters) 0   Birch Mix (W/F in millimeters) 0   [place] (W/F in millimeters) 0   Oak (W/F in millimeters) 0   [place] (W/F in millimeters) 0   [place] (W/F in millimeters) 0   [place] (W/F in millimeters) 0   Care[place] (W/F in millimeters) 0   Nettle (W/F in millimeters) 0   English Plantain (W/F in millimeters) 0   Kochia (W/F in millimeters) 0   Lamb's Quarter (W/F in millimeters) 0   Marshelder (W/F in millimeters) 0   Ragweed Mix* ALK (W/F in millimeters) 0   Russian Thistle (W/F in millimeters) 0   Sagebrush/Mugwort (W/F in millimeters) 0   Sheep Sorrel (W/F in millimeters) 0   Feather Mix* ALK (W/F in millimeters) 0   Penicillium Mix (1:10 w/v) 0   Curvularia spicifera (1:10 w/v) 0   Epicoccum (1:10 w/v) 0   Aspergillus fumigatus (1:10 w/v): 0   Alternaria tenius (1:10 w/v) 0   H. Cladosporium (1:10 w/v) 0   Phoma herbarum  (1:10 w/v) 0       Verbal consent obtained for skin testing  ASSESSMENT/PLAN:  Leanne Valentino is a 58 year old female seen today for evaluation of a rash which has resolved.  Also concern for seasonal allergy symptoms including rhinorrhea and itching of her nose.  Skin testing was negative.  I am uncertain of the etiology of the rash.     Try Zyrtec 10 mg as needed (if rash returns).  May also use for nasal symptoms.  May use topical steroids as needed also if the rash does return.  Other options include Flonase 1-2 sprays each nostril daily for her nasal symptoms.  Will check labs assess for allergy is not detected by skin testing    Follow-up as needed      Thank you for allowing me to participate in the care of Leanne Valentino.      I spent 30 minutes on the date of the encounter doing chart review, history and exam, documentation and further coordination as noted above exclusive of separately reported interpretations    Sagar Ballard MD  Allergy/Immunology  [facility]      Verbal consent was obtained prior to procedure by the provider. Per provider verbal order, placed Adult Environmental Panel scratch test(s). Once antigens were placed, patient was monitored for 15 minutes in clinic. Provider read test after 15 minutes. Patient tolerated procedure well. All questions and concerns were addressed at office visit by the provider.     ROSA KuoN, RN      Again, thank you for allowing me to participate in the care of your patient.        Sincerely,        Sagar Ballard MD

## 2023-09-05 ENCOUNTER — LAB (OUTPATIENT)
Dept: LAB | Facility: CLINIC | Age: 58
End: 2023-09-05
Payer: COMMERCIAL

## 2023-09-05 DIAGNOSIS — R09.89 RUNNY NOSE: ICD-10-CM

## 2023-09-05 DIAGNOSIS — L29.89 ITCHY NOSE: ICD-10-CM

## 2023-09-05 DIAGNOSIS — R21 RASH: ICD-10-CM

## 2023-09-05 DIAGNOSIS — E87.1 HYPONATREMIA: ICD-10-CM

## 2023-09-05 LAB
ANION GAP SERPL CALCULATED.3IONS-SCNC: 15 MMOL/L (ref 7–15)
BUN SERPL-MCNC: 12.7 MG/DL (ref 6–20)
CALCIUM SERPL-MCNC: 9.3 MG/DL (ref 8.6–10)
CHLORIDE SERPL-SCNC: 104 MMOL/L (ref 98–107)
CREAT SERPL-MCNC: 0.67 MG/DL (ref 0.51–0.95)
DEPRECATED HCO3 PLAS-SCNC: 20 MMOL/L (ref 22–29)
GFR SERPL CREATININE-BSD FRML MDRD: >90 ML/MIN/1.73M2
GLUCOSE SERPL-MCNC: 95 MG/DL (ref 70–99)
POTASSIUM SERPL-SCNC: 3.9 MMOL/L (ref 3.4–5.3)
SODIUM SERPL-SCNC: 139 MMOL/L (ref 136–145)

## 2023-09-05 PROCEDURE — 80048 BASIC METABOLIC PNL TOTAL CA: CPT

## 2023-09-05 PROCEDURE — 36415 COLL VENOUS BLD VENIPUNCTURE: CPT

## 2023-09-05 PROCEDURE — 86003 ALLG SPEC IGE CRUDE XTRC EA: CPT

## 2023-09-06 LAB
COMMON RAGWEED IGE QN: <0.1 KU(A)/L
D FARINAE IGE QN: <0.1 KU(A)/L
D PTERONYSS IGE QN: 0.14 KU(A)/L
DOG DANDER+EPITH IGE QN: <0.1 KU(A)/L
GIANT RAGWEED IGE QN: <0.1 KU(A)/L
TIMOTHY IGE QN: <0.1 KU(A)/L

## 2023-10-03 ENCOUNTER — VIRTUAL VISIT (OUTPATIENT)
Dept: FAMILY MEDICINE | Facility: CLINIC | Age: 58
End: 2023-10-03
Attending: INTERNAL MEDICINE
Payer: COMMERCIAL

## 2023-10-03 DIAGNOSIS — F41.9 ANXIETY: ICD-10-CM

## 2023-10-03 DIAGNOSIS — Z12.11 SCREEN FOR COLON CANCER: Primary | ICD-10-CM

## 2023-10-03 PROCEDURE — 99214 OFFICE O/P EST MOD 30 MIN: CPT | Mod: VID | Performed by: INTERNAL MEDICINE

## 2023-10-03 RX ORDER — BUSPIRONE HYDROCHLORIDE 10 MG/1
10 TABLET ORAL 2 TIMES DAILY
Qty: 180 TABLET | Refills: 0 | Status: SHIPPED | OUTPATIENT
Start: 2023-10-03 | End: 2023-11-20

## 2023-10-03 NOTE — PROGRESS NOTES
Leanne is a 58 year old who is being evaluated via a billable video visit.      How would you like to obtain your AVS? MyChart  If the video visit is dropped, the invitation should be resent by: Text to cell phone: [phone]  Will anyone else be joining your video visit? No      Assessment & Plan     Screen for colon cancer  Scheduled     Anxiety  Follow up in 3 months.   Discussed side effects  - PRIMARY CARE FOLLOW-UP SCHEDULING  - busPIRone (BUSPAR) 10 MG tablet; Take 1 tablet (10 mg) by mouth 2 times daily       See Patient Instructions    Tamy Mc MD  [facility]    Subjective   Leanne is a 58 year old, presenting for the following health issues:  Recheck Medication    HPI     Leanne is a very pleasant 58 year old who had a follow up visit today.     She started Celexa 20 mg daily, 2 months ago, reports she still feels the same, stressful situation at work as well. Hydroxyzine makes her drowsy, she took this in the morning.     Discussed about buspirone. 10 mg bid. Hydroxyzine at night due to side effects if drowsiness.     Patient agrees.         Review of Systems       Objective    Vitals - Patient Reported  Pain Score: No Pain (0)  Vitals:  No vitals were obtained today due to virtual visit.    Physical Exam   GEN: No acute distress  RESP: No audible increased work of breathing. Patient speaking in full sentences without distress.  PSYCH: pleasant  Exam otherwise limited due to virtual platform        Video-Visit Details    Type of service:  Video Visit     Originating Location (pt. Location): Home  Distant Location (provider location):  On-site  Platform used for Video Visit: [employer]

## 2023-11-15 ENCOUNTER — MYC MEDICAL ADVICE (OUTPATIENT)
Dept: FAMILY MEDICINE | Facility: CLINIC | Age: 58
End: 2023-11-15
Payer: COMMERCIAL

## 2023-11-15 DIAGNOSIS — F41.9 ANXIETY: Primary | ICD-10-CM

## 2023-11-15 DIAGNOSIS — F33.0 MILD RECURRENT MAJOR DEPRESSION (H): ICD-10-CM

## 2023-11-20 RX ORDER — CITALOPRAM HYDROBROMIDE 40 MG/1
40 TABLET ORAL DAILY
Qty: 90 TABLET | Refills: 0 | Status: SHIPPED | OUTPATIENT
Start: 2023-11-20 | End: 2024-02-20

## 2023-11-22 NOTE — TELEPHONE ENCOUNTER
PCP sent rx to pharmacy on 11/20/23:        Notified patient via [employer] message.    Murphy Conn RN  [facility]

## 2024-01-31 ENCOUNTER — OFFICE VISIT (OUTPATIENT)
Dept: INTERNAL MEDICINE | Facility: CLINIC | Age: 59
End: 2024-01-31
Payer: OTHER MISCELLANEOUS

## 2024-01-31 VITALS
HEART RATE: 83 BPM | BODY MASS INDEX: 23.97 KG/M2 | HEIGHT: 63 IN | TEMPERATURE: 98.2 F | DIASTOLIC BLOOD PRESSURE: 83 MMHG | RESPIRATION RATE: 16 BRPM | WEIGHT: 135.3 LBS | SYSTOLIC BLOOD PRESSURE: 119 MMHG | OXYGEN SATURATION: 99 %

## 2024-01-31 DIAGNOSIS — F33.0 MILD RECURRENT MAJOR DEPRESSION (H): ICD-10-CM

## 2024-01-31 DIAGNOSIS — Z01.818 PREOP GENERAL PHYSICAL EXAM: Primary | ICD-10-CM

## 2024-01-31 DIAGNOSIS — S82.031P: ICD-10-CM

## 2024-01-31 PROCEDURE — 99214 OFFICE O/P EST MOD 30 MIN: CPT | Mod: 25 | Performed by: INTERNAL MEDICINE

## 2024-01-31 PROCEDURE — 93000 ELECTROCARDIOGRAM COMPLETE: CPT | Performed by: INTERNAL MEDICINE

## 2024-01-31 ASSESSMENT — PAIN SCALES - GENERAL: PAINLEVEL: MODERATE PAIN (5)

## 2024-01-31 NOTE — PROGRESS NOTES
Preoperative Evaluation  [place] [address]  [address]  [facility] [address]  Phone: [phone]  Primary Provider: Tamy Mc  Pre-op Performing Provider: MICHAEL BOYKIN  Jan 31, 2024       Leanne is a 58 year old, presenting for the following:  Pre-Op Exam        1/31/2024     9:38 AM   Additional Questions   Roomed by Mirna BENNETT   Accompanied by n/a     Surgical Information  Surgery/Procedure: Open reduction internal fixation patella fx  [facility]  Surgeon: Dr. Colin  Surgery Date: 02/01/2024  Time of Surgery: 0715  Where patient plans to recover: At home with family  Fax number for surgical facility: [phone]    Assessment & Plan     The proposed surgical procedure is considered INTERMEDIATE risk.    Preop general physical exam  Cleared for surgery    - EKG 12-lead complete w/read - Clinics    Closed displaced transverse fracture of right patella with malunion, subsequent encounter  Planned ORIF     Mild recurrent major depression (H24)  Continue Celexa             - No identified additional risk factors other than previously addressed        Recommendation  APPROVAL GIVEN to proceed with proposed procedure, without further diagnostic evaluation.          Subjective       HPI related to upcoming procedure: patient has right patella fracture after a fall one week ago.   Has transverse 5 cm displaced fracture. Planned for surgical treatment.   Has h/o depression. On medical treatment, controlled, no side effects. No depressive symptoms or suicidal ideation.          1/31/2024     9:27 AM   Preop Questions   1. Have you ever had a heart attack or stroke? No   2. Have you ever had surgery on your heart or blood vessels, such as a stent placement, a coronary artery bypass, or surgery on an artery in your head, neck, heart, or legs? No   3. Do you have chest pain with activity? No   4. Do you have a history of  heart failure?  No   5. Do you currently have a cold, bronchitis or symptoms of other infection? No   6. Do you have a cough, shortness of breath, or wheezing? No   7. Do you or anyone in your family have previous history of blood clots? No   8. Do you or does anyone in your family have a serious bleeding problem such as prolonged bleeding following surgeries or cuts? No   9. Have you ever had problems with anemia or been told to take iron pills? No   10. Have you had any abnormal blood loss such as black, tarry or bloody stools, or abnormal vaginal bleeding? No   11. Have you ever had a blood transfusion? No   12. Are you willing to have a blood transfusion if it is medically needed before, during, or after your surgery? Yes   13. Have you or any of your relatives ever had problems with anesthesia? No   14. Do you have sleep apnea, excessive snoring or daytime drowsiness? No   15. Do you have any artifical heart valves or other implanted medical devices like a pacemaker, defibrillator, or continuous glucose monitor? No   16. Do you have artificial joints? No   17. Are you allergic to latex? No   18. Is there any chance that you may be pregnant? No       Health Care Directive  Patient does not have a Health Care Directive or Living Will: Discussed advance care planning with patient; however, patient declined at this time.    Preoperative Review of    reviewed - no record of controlled substances prescribed.      Status of Chronic Conditions:  See problem list for active medical problems.  Problems all longstanding and stable, except as noted/documented.  See ROS for pertinent symptoms related to these conditions.    Patient Active Problem List    Diagnosis Date Noted    Annual physical exam 07/03/2023     Priority: Medium    Screen for colon cancer 07/03/2023     Priority: Medium    Visit for screening mammogram 07/03/2023     Priority: Medium    Cervical cancer screening 07/03/2023     Priority: Medium    Rash 07/03/2023      Priority: Medium    Personal history of fall 03/10/2023     Priority: Medium    Tobacco use disorder 03/10/2023     Priority: Medium    Anxiety 03/10/2023     Priority: Medium    S/P ORIF (open reduction internal fixation) fracture 03/10/2023     Priority: Medium     right ankle       History of uterine fibroid 03/10/2023     Priority: Medium    Open ankle wound 12/15/2022     Priority: Medium    CARDIOVASCULAR SCREENING; LDL GOAL LESS THAN 160 10/31/2010     Priority: Medium    Menorrhagia 07/13/2010     Priority: Medium    Mild recurrent major depression (H24) 05/25/2009     Priority: Medium    ASCUS with positive high risk HPV cervical 05/14/2008     Priority: Medium     4/7/08 ASCUS pap, + HR HPV 16  5/14/08 [place] Bx - negative  2009 NIL pap  2010 NIL pap  7/3/23 NIL Pap, Neg HPV. Plan cotest in 3 years.         Elderly primigravida 04/07/2008     Priority: Medium     Problem list name updated by automated process. Provider to review      Dysmenorrhea 04/07/2008     Priority: Medium    Leiomyoma of uterus 04/07/2008     Priority: Medium     Problem list name updated by automated process. Provider to review        Past Medical History:   Diagnosis Date    Adjustment disorder with mixed anxiety and depressed mood     Leiomyoma of uterus, unspecified      Past Surgical History:   Procedure Laterality Date    HC LAPAROSCOPIC MYOMECTOMY, 1 - 4 INTRAMURAL MYOMAS =<250 GM  09/2006    laparoscopic, pedunculated    IRRIGATION AND DEBRIDEMENT KNEE, COMBINED Right 12/15/2022    Procedure: Open irrigation and debridement of right distal tibia surgical wound with placement of vacuum-assisted wound closure device.;  Surgeon: Raj Gallardo MD;  Location: [facility] OR    LAMINECT/DISCECTOMY, LUMBAR  11/15/11    L4-L5    OPEN REDUCTION INTERNAL FIXATION TIBIA Right 9/29/2022    Procedure: OPEN REDUCTION INTERNAL FIXATION RIGHT TIBIA.;  Surgeon: Raj Gallardo MD;  Location: [facility] OR    REMOVE HARDWARE ANKLE Right  3/13/2023    Procedure: Hardware removal, right distal tibia and fibula;  Surgeon: Raj Gallardo MD;  Location: [facility] OR    ZZC APPENDECTOMY,RUPT APPENDX+ABSCESS  08/1989     Current Outpatient Medications   Medication Sig Dispense Refill    citalopram (CELEXA) 40 MG tablet Take 1 tablet (40 mg) by mouth daily 90 tablet 0    COLLAGEN PO Take 1 tablet by mouth daily      fish oil-omega-3 fatty acids 1000 MG capsule Take 1 g by mouth daily      hydrocortisone 1 % CREA cream Place rectally 2 times daily as needed for itching      hydrOXYzine (ATARAX) 25 MG tablet Take 1 tablet (25 mg) by mouth 2 times daily as needed for anxiety 20 tablet 1    hypromellose (GENTEAL) 0.3 % SOLN ophthalmic solution Place 1 drop into both eyes every hour as needed for dry eyes      multivitamin, therapeutic (THERA-VIT) TABS tablet Take 1 tablet by mouth daily      nicotine (NICORETTE) 2 MG gum Place 1 each (2 mg) inside cheek every hour as needed for smoking cessation 100 each 1    diphenhydrAMINE (BENADRYL) 25 MG capsule Take 25 mg by mouth every 6 hours as needed for itching or allergies         Allergies   Allergen Reactions    Sumatriptan Other (See Comments)     Other reaction(s): Tachycardia  tachycardia      Sulfa Antibiotics Rash        Social History     Tobacco Use    Smoking status: Every Day     Packs/day: 0.50     Years: 18.00     Additional pack years: 0.00     Total pack years: 9.00     Types: Cigarettes    Smokeless tobacco: Never   Substance Use Topics    Alcohol use: Yes     Comment: once or twice weekly     Family History   Problem Relation Age of Onset    Hypertension Mother     Arthritis Mother     Depression Mother     Hypothyroidism Mother     Hypertension Father     Eye Disorder Father         cataracts    Lipids Father     Heart Disease Father         heart failure    Cerebrovascular Disease Paternal Grandmother      History   Drug Use No         Review of Systems    Review of Systems  Constitutional,  "HEENT, cardiovascular, pulmonary, GI, , musculoskeletal, neuro, skin, endocrine and psych systems are negative, except as otherwise noted.  Objective    /83 (BP Location: Left arm, Cuff Size: Adult Regular)   Pulse 83   Temp 98.2  F (36.8  C) (Tympanic)   Resp 16   Ht 1.6 m (5' 3\")   Wt 61.4 kg (135 lb 4.8 oz)   LMP 06/20/2012   SpO2 99%   BMI 23.97 kg/m     Estimated body mass index is 23.97 kg/m  as calculated from the following:    Height as of this encounter: 1.6 m (5' 3\").    Weight as of this encounter: 61.4 kg (135 lb 4.8 oz).  Physical Exam  GENERAL: alert and no distress  EYES: Eyes grossly normal to inspection, PERRL and conjunctivae and sclerae normal  HENT: ear canals and TM's normal, nose and mouth without ulcers or lesions  NECK: no adenopathy, no asymmetry, masses, or scars  RESP: lungs clear to auscultation - no rales, rhonchi or wheezes  CV: regular rate and rhythm, normal S1 S2, no S3 or S4, no murmur, click or rub, no peripheral edema  ABDOMEN: soft, nontender, no hepatosplenomegaly, no masses and bowel sounds normal  MS: no gross musculoskeletal defects noted, no edema, right knee in an orthopedic brace   SKIN: no suspicious lesions or rashes  NEURO: Normal strength and tone, mentation intact and speech normal  PSYCH: mentation appears normal, affect normal/bright  LYMPH: no cervical, supraclavicular, axillary, or inguinal adenopathy    Recent Labs   Lab Test 09/05/23  0958 03/10/23  0833 01/23/23  1515   HGB  --  12.4 12.8   PLT  --  284 347    136  --    POTASSIUM 3.9 3.7  --    CR 0.67 0.53 0.72        Diagnostics  No labs were ordered during this visit.   EKG: appears normal, NSR, normal axis, normal intervals, no acute ST/T changes c/w ischemia, no LVH by voltage criteria, unchanged from previous tracings    Revised Cardiac Risk Index (RCRI)  The patient has the following serious cardiovascular risks for perioperative complications:   - No serious cardiac risks = 0 " points     RCRI Interpretation: 0 points: Class I (very low risk - 0.4% complication rate)         Signed Electronically by: Ziggy [place] MD  Copy of this evaluation report is provided to requesting physician.

## 2024-02-17 DIAGNOSIS — F41.9 ANXIETY: ICD-10-CM

## 2024-02-17 DIAGNOSIS — F33.0 MILD RECURRENT MAJOR DEPRESSION (H): ICD-10-CM

## 2024-02-20 RX ORDER — CITALOPRAM HYDROBROMIDE 40 MG/1
40 TABLET ORAL DAILY
Qty: 90 TABLET | Refills: 2 | Status: SHIPPED | OUTPATIENT
Start: 2024-02-20

## 2024-04-01 ENCOUNTER — MYC REFILL (OUTPATIENT)
Dept: FAMILY MEDICINE | Facility: CLINIC | Age: 59
End: 2024-04-01
Payer: COMMERCIAL

## 2024-04-01 DIAGNOSIS — F41.9 ANXIETY: ICD-10-CM

## 2024-04-01 RX ORDER — HYDROXYZINE HYDROCHLORIDE 25 MG/1
25 TABLET, FILM COATED ORAL 2 TIMES DAILY PRN
Qty: 20 TABLET | Refills: 1 | Status: SHIPPED | OUTPATIENT
Start: 2024-04-01 | End: 2024-05-07

## 2024-04-01 NOTE — TELEPHONE ENCOUNTER
Prescription approved per [facility] Refill Protocol.  Bella Rey RN  [facility]

## 2024-05-07 DIAGNOSIS — F41.9 ANXIETY: ICD-10-CM

## 2024-05-07 RX ORDER — HYDROXYZINE HYDROCHLORIDE 25 MG/1
TABLET, FILM COATED ORAL
Qty: 20 TABLET | Refills: 1 | Status: SHIPPED | OUTPATIENT
Start: 2024-05-07 | End: 2024-08-19

## 2024-08-18 DIAGNOSIS — F41.9 ANXIETY: ICD-10-CM

## 2024-08-19 RX ORDER — HYDROXYZINE HYDROCHLORIDE 25 MG/1
TABLET, FILM COATED ORAL
Qty: 20 TABLET | Refills: 0 | Status: SHIPPED | OUTPATIENT
Start: 2024-08-19 | End: 2024-09-19

## 2024-09-07 ENCOUNTER — HEALTH MAINTENANCE LETTER (OUTPATIENT)
Age: 59
End: 2024-09-07

## 2024-09-18 ENCOUNTER — TELEPHONE (OUTPATIENT)
Dept: FAMILY MEDICINE | Facility: CLINIC | Age: 59
End: 2024-09-18
Payer: COMMERCIAL

## 2024-09-19 ENCOUNTER — MYC REFILL (OUTPATIENT)
Dept: FAMILY MEDICINE | Facility: CLINIC | Age: 59
End: 2024-09-19
Payer: COMMERCIAL

## 2024-09-19 DIAGNOSIS — F41.9 ANXIETY: ICD-10-CM

## 2024-09-19 NOTE — TELEPHONE ENCOUNTER
"Rx passes protocol however please note sig from last refill: Please fill or deny and route to primary care pool to call for appt.     \"Notes to Pharmacy: One time refill only. Please notify patient that they are due for appointment, and to call to schedule.\"    "

## 2024-09-20 RX ORDER — HYDROXYZINE HYDROCHLORIDE 25 MG/1
25 TABLET, FILM COATED ORAL PRN
Qty: 20 TABLET | Refills: 0 | Status: SHIPPED | OUTPATIENT
Start: 2024-09-20

## 2024-09-23 ENCOUNTER — OFFICE VISIT (OUTPATIENT)
Dept: FAMILY MEDICINE | Facility: CLINIC | Age: 59
End: 2024-09-23
Payer: COMMERCIAL

## 2024-09-23 VITALS
RESPIRATION RATE: 16 BRPM | BODY MASS INDEX: 22.75 KG/M2 | HEIGHT: 63 IN | DIASTOLIC BLOOD PRESSURE: 85 MMHG | SYSTOLIC BLOOD PRESSURE: 134 MMHG | WEIGHT: 128.4 LBS | OXYGEN SATURATION: 100 % | HEART RATE: 72 BPM | TEMPERATURE: 97.9 F

## 2024-09-23 DIAGNOSIS — Z01.818 PREOP GENERAL PHYSICAL EXAM: Primary | ICD-10-CM

## 2024-09-23 DIAGNOSIS — T14.8XXA CHRONIC WOUND: ICD-10-CM

## 2024-09-23 DIAGNOSIS — F17.210 CIGARETTE SMOKER: ICD-10-CM

## 2024-09-23 PROCEDURE — 99213 OFFICE O/P EST LOW 20 MIN: CPT | Performed by: NURSE PRACTITIONER

## 2024-09-23 ASSESSMENT — PATIENT HEALTH QUESTIONNAIRE - PHQ9
10. IF YOU CHECKED OFF ANY PROBLEMS, HOW DIFFICULT HAVE THESE PROBLEMS MADE IT FOR YOU TO DO YOUR WORK, TAKE CARE OF THINGS AT HOME, OR GET ALONG WITH OTHER PEOPLE: NOT DIFFICULT AT ALL
SUM OF ALL RESPONSES TO PHQ QUESTIONS 1-9: 0
SUM OF ALL RESPONSES TO PHQ QUESTIONS 1-9: 0

## 2024-09-23 NOTE — PATIENT INSTRUCTIONS
I recommend a B-complex daily       How to Take Your Medication Before Surgery  Preoperative Medication Instructions   Antiplatelet or Anticoagulation Medication Instructions   - Patient is on no antiplatelet or anticoagulation medications.    Additional Medication Instructions  Take all scheduled medications on the day of surgery EXCEPT for modifications listed below:  Hold supplements        Patient Education   Preparing for Your Surgery  Getting started  A nurse will call you to review your health history and instructions. They will give you an arrival time based on your scheduled surgery time. Please be ready to share:  Your doctor's clinic name and phone number  Your medical, surgical, and anesthesia history  A list of allergies and sensitivities  A list of medicines, including herbal treatments and over-the-counter drugs  Whether the patient has a legal guardian (ask how to send us the papers in advance)  Please tell us if you're pregnant--or if there's any chance you might be pregnant. Some surgeries may injure a fetus (unborn baby), so they require a pregnancy test. Surgeries that are safe for a fetus don't always need a test, and you can choose whether to have one.   If you have a child who's having surgery, please ask for a copy of Preparing for Your Child's Surgery.    Preparing for surgery  Within 10 to 30 days of surgery: Have a pre-op exam (sometimes called an H&P, or History and Physical). This can be done at a clinic or pre-operative center.  If you're having a , you may not need this exam. Talk to your care team.  At your pre-op exam, talk to your care team about all medicines you take. If you need to stop any medicines before surgery, ask when to start taking them again.  We do this for your safety. Many medicines can make you bleed too much during surgery. Some change how well surgery (anesthesia) drugs work.  Call your insurance company to let them know you're having surgery. (If you  don't have insurance, call [phone].)  Call your clinic if there's any change in your health. This includes signs of a cold or flu (sore throat, runny nose, cough, rash, fever). It also includes a scrape or scratch near the surgery site.  If you have questions on the day of surgery, call your hospital or surgery center.  Eating and drinking guidelines  For your safety: Unless your surgeon tells you otherwise, follow the guidelines below.  Eat and drink as usual until 8 hours before you arrive for surgery. After that, no food or milk.  Drink clear liquids until 2 hours before you arrive. These are liquids you can see through, like water, Gatorade, and Propel Water. They also include plain black coffee and tea (no cream or milk), candy, and breath mints. You can spit out gum when you arrive.  If you drink alcohol: Stop drinking it the night before surgery.  If your care team tells you to take medicine on the morning of surgery, it's okay to take it with a sip of water.  Preventing infection  Shower or bathe the night before and morning of your surgery. Follow the instructions your clinic gave you. (If no instructions, use regular soap.)  Don't shave or clip hair near your surgery site. We'll remove the hair if needed.  Don't smoke or vape the morning of surgery. You may chew nicotine gum up to 2 hours before surgery. A nicotine patch is okay.  Note: Some surgeries require you to completely quit smoking and nicotine. Check with your surgeon.  Your care team will make every effort to keep you safe from infection. We will:  Clean our hands often with soap and water (or an alcohol-based hand rub).  Clean the skin at your surgery site with a special soap that kills germs.  Give you a special gown to keep you warm. (Cold raises the risk of infection.)  Wear special hair covers, masks, gowns and gloves during surgery.  Give antibiotic medicine, if prescribed. Not all surgeries need antibiotics.  What to bring on the day  of surgery  Photo ID and insurance card  Copy of your health care directive, if you have one  Glasses and hearing aids (bring cases)  You can't wear contacts during surgery  Inhaler and eye drops, if you use them (tell us about these when you arrive)  CPAP machine or breathing device, if you use them  A few personal items, if spending the night  If you have . . .  A pacemaker, ICD (cardiac defibrillator) or other implant: Bring the ID card.  An implanted stimulator: Bring the remote control.  A legal guardian: Bring a copy of the certified (court-stamped) guardianship papers.  Please remove any jewelry, including body piercings. Leave jewelry and other valuables at home.  If you're going home the day of surgery  You must have a responsible adult drive you home. They should stay with you overnight as well.  If you don't have someone to stay with you, and you aren't safe to go home alone, we may keep you overnight. Insurance often won't pay for this.  After surgery  If it's hard to control your pain or you need more pain medicine, please call your surgeon's office.  Questions?   If you have any questions for your care team, list them here: _________________________________________________________________________________________________________________________________________________________________________ ____________________________________ ____________________________________ ____________________________________  For informational purposes only. Not to replace the advice of your health care provider. Copyright   2003, 2019 [facility]. All rights reserved. Clinically reviewed by Edith Bryson MD. [employer] 800053 - REV 12/22.

## 2024-09-23 NOTE — PROGRESS NOTES
Preoperative Evaluation  [facility]  [address], [address]  [facility] [address]  Phone: [phone]  Primary Provider: Tamy Mc MD  Pre-op Performing Provider: SHIV Farris CNP  Sep 23, 2024             9/23/2024   Surgical Information   What procedure is being done? right leg debridement   [facility] or [facility]   Who is doing the procedure / surgery? Dr Miramontes   Date of surgery / procedure: Sept 26   Time of surgery / procedure: out patient   Where do you plan to recover after surgery? at home with family        Fax number for surgical facility: [phone]    Assessment & Plan     The proposed surgical procedure is considered LOW risk.    Preop general physical exam  Ok for procedure. No concerns today     Chronic wound  Plan for debridement     Cigarette smoker  Working on [employer] back. Has gum that she is using.              - No identified additional risk factors other than previously addressed    Preoperative Medication Instructions  Antiplatelet or Anticoagulation Medication Instructions   - Patient is on no antiplatelet or anticoagulation medications.    Additional Medication Instructions  Take all scheduled medications on the day of surgery EXCEPT for modifications listed below:  Hold supplements     Recommendation  Approval given to proceed with proposed procedure, without further diagnostic evaluation.    Silvestre Perdomo is a 59 year old, presenting for the following:  No chief complaint on file.        HPI related to upcoming procedure:   Going for wound debridement for this chronic wound   Has been feeling well               9/23/2024   Pre-Op Questionnaire   Have you ever had a heart attack or stroke? No   Have you ever had surgery on your heart or blood vessels, such as a stent placement, a coronary artery bypass, or surgery on an artery in your head, neck, heart, or legs? No   Do you have  chest pain with activity? No   Do you have a history of heart failure? No   Do you currently have a cold, bronchitis or symptoms of other infection? No   Do you have a cough, shortness of breath, or wheezing? No   Do you or anyone in your family have previous history of blood clots? No   Do you or does anyone in your family have a serious bleeding problem such as prolonged bleeding following surgeries or cuts? No   Have you ever had problems with anemia or been told to take iron pills? No   Have you had any abnormal blood loss such as black, tarry or bloody stools, or abnormal vaginal bleeding? No   Have you ever had a blood transfusion? No   Are you willing to have a blood transfusion if it is medically needed before, during, or after your surgery? Yes   Have you or any of your relatives ever had problems with anesthesia? No   Do you have sleep apnea, excessive snoring or daytime drowsiness? No   Do you have any artifical heart valves or other implanted medical devices like a pacemaker, defibrillator, or continuous glucose monitor? No   Do you have artificial joints? No   Are you allergic to latex? No        Health Care Directive  Patient does not have a Health Care Directive or Living Will:     Preoperative Review of    reviewed - controlled substances prescribed by other outside provider(s). Short term oxycodone       Status of Chronic Conditions:  See problem list for active medical problems.  Problems all longstanding and stable, except as noted/documented.  See ROS for pertinent symptoms related to these conditions.    Patient Active Problem List    Diagnosis Date Noted    Annual physical exam 07/03/2023     Priority: Medium    Screen for colon cancer 07/03/2023     Priority: Medium    Visit for screening mammogram 07/03/2023     Priority: Medium    Cervical cancer screening 07/03/2023     Priority: Medium    Rash 07/03/2023     Priority: Medium    Personal history of fall 03/10/2023     Priority: Medium     Tobacco use disorder 03/10/2023     Priority: Medium    Anxiety 03/10/2023     Priority: Medium    S/P ORIF (open reduction internal fixation) fracture 03/10/2023     Priority: Medium     right ankle       History of uterine fibroid 03/10/2023     Priority: Medium    Open ankle wound 12/15/2022     Priority: Medium    CARDIOVASCULAR SCREENING; LDL GOAL LESS THAN 160 10/31/2010     Priority: Medium    Menorrhagia 07/13/2010     Priority: Medium    Mild recurrent major depression (H24) 05/25/2009     Priority: Medium    ASCUS with positive high risk HPV cervical 05/14/2008     Priority: Medium     4/7/08 ASCUS pap, + HR HPV 16  5/14/08 Ava Bx - negative  2009 NIL pap  2010 NIL pap  7/3/23 NIL Pap, Neg HPV. Plan cotest in 3 years.         Elderly primigravida 04/07/2008     Priority: Medium     Problem list name updated by automated process. Provider to review      Dysmenorrhea 04/07/2008     Priority: Medium    Leiomyoma of uterus 04/07/2008     Priority: Medium     Problem list name updated by automated process. Provider to review        Past Medical History:   Diagnosis Date    Adjustment disorder with mixed anxiety and depressed mood     Leiomyoma of uterus, unspecified      Past Surgical History:   Procedure Laterality Date    HC LAPAROSCOPIC MYOMECTOMY, 1 - 4 INTRAMURAL MYOMAS =<250 GM  09/2006    laparoscopic, pedunculated    IRRIGATION AND DEBRIDEMENT KNEE, COMBINED Right 12/15/2022    Procedure: Open irrigation and debridement of right distal tibia surgical wound with placement of vacuum-assisted wound closure device.;  Surgeon: Raj Gallardo MD;  Location: [facility] OR    LAMINECT/DISCECTOMY, LUMBAR  11/15/11    L4-L5    OPEN REDUCTION INTERNAL FIXATION TIBIA Right 9/29/2022    Procedure: OPEN REDUCTION INTERNAL FIXATION RIGHT TIBIA.;  Surgeon: Raj Gallardo MD;  Location: [facility] OR    REMOVE HARDWARE ANKLE Right 3/13/2023    Procedure: Hardware removal, right distal tibia and fibula;   Surgeon: Raj Gallardo MD;  Location:  OR    [facility] APPENDECTOMY,RUPT APPENDX+ABSCESS  08/1989     Current Outpatient Medications   Medication Sig Dispense Refill    citalopram (CELEXA) 40 MG tablet TAKE 1 TABLET(40 MG) BY MOUTH DAILY 90 tablet 2    COLLAGEN PO Take 1 tablet by mouth daily      fish oil-omega-3 fatty acids 1000 MG capsule Take 1 g by mouth daily      hydrocortisone 1 % CREA cream Place rectally 2 times daily as needed for itching      hydrOXYzine HCl (ATARAX) 25 MG tablet Take 1 tablet (25 mg) by mouth as needed for itching or anxiety. 20 tablet 0    hypromellose (GENTEAL) 0.3 % SOLN ophthalmic solution Place 1 drop into both eyes every hour as needed for dry eyes      multivitamin, therapeutic (THERA-VIT) TABS tablet Take 1 tablet by mouth daily      nicotine (NICORETTE) 2 MG gum Place 1 each (2 mg) inside cheek every hour as needed for smoking cessation 100 each 1       Allergies   Allergen Reactions    Sumatriptan Other (See Comments)     Other reaction(s): Tachycardia  tachycardia      Sulfa Antibiotics Rash        Social History     Tobacco Use    Smoking status: Every Day     Current packs/day: 0.50     Average packs/day: 0.5 packs/day for 18.0 years (9.0 ttl pk-yrs)     Types: Cigarettes    Smokeless tobacco: Never   Substance Use Topics    Alcohol use: Yes     Comment: once or twice weekly     Family History   Problem Relation Age of Onset    Hypertension Mother     Arthritis Mother     Depression Mother     Hypothyroidism Mother     Hypertension Father     Eye Disorder Father         cataracts    Lipids Father     Heart Disease Father         heart failure    Cerebrovascular Disease Paternal Grandmother      History   Drug Use No             Review of Systems  Constitutional, neuro, ENT, endocrine, pulmonary, cardiac, gastrointestinal, genitourinary, musculoskeletal, integument and psychiatric systems are negative, except as otherwise noted.    Objective    /85 (BP  "Location: Right arm, Patient Position: Sitting, Cuff Size: Adult Regular)   Pulse 72   Temp 97.9  F (36.6  C)   Resp 16   Ht 1.6 m (5' 3\")   Wt 58.2 kg (128 lb 6.4 oz)   LMP 06/20/2012   SpO2 100%   BMI 22.75 kg/m     Estimated body mass index is 23.97 kg/m  as calculated from the following:    Height as of 1/31/24: 1.6 m (5' 3\").    Weight as of 1/31/24: 61.4 kg (135 lb 4.8 oz).  Physical Exam  GENERAL: alert and no distress  EYES: Eyes grossly normal to inspection, PERRL and conjunctivae and sclerae normal  RESP: lungs clear to auscultation - no rales, rhonchi or wheezes  CV: regular rate and rhythm, normal S1 S2, no S3 or S4, no murmur, click or rub, no peripheral edema  MS: no gross musculoskeletal defects noted, no edema  SKIN: no suspicious lesions or rashes  NEURO: Normal strength and tone, mentation intact and speech normal  PSYCH: mentation appears normal, affect normal/bright    No results for input(s): \"HGB\", \"PLT\", \"INR\", \"NA\", \"POTASSIUM\", \"CR\", \"A1C\" in the last 8760 hours.     Diagnostics  No labs were ordered during this visit.   No EKG required for low risk surgery (cataract, skin procedure, breast biopsy, etc).    Revised Cardiac Risk Index (RCRI)  The patient has the following serious cardiovascular risks for perioperative complications:   - No serious cardiac risks = 0 points     RCRI Interpretation: 0 points: Class I (very low risk - 0.4% complication rate)         Signed Electronically by: SHIV Farris CNP  A copy of this evaluation report is provided to the requesting physician.         Answers submitted by the patient for this visit:  Patient Health Questionnaire (Submitted on 9/23/2024)  If you checked off any problems, how difficult have these problems made it for you to do your work, take care of things at home, or get along with other people?: Not difficult at all  PHQ9 TOTAL SCORE: 0    "

## 2024-10-24 ENCOUNTER — MYC MEDICAL ADVICE (OUTPATIENT)
Dept: FAMILY MEDICINE | Facility: CLINIC | Age: 59
End: 2024-10-24

## 2024-11-25 DIAGNOSIS — F41.9 ANXIETY: ICD-10-CM

## 2024-11-25 DIAGNOSIS — F33.0 MILD RECURRENT MAJOR DEPRESSION (H): ICD-10-CM

## 2024-11-26 RX ORDER — CITALOPRAM HYDROBROMIDE 40 MG/1
40 TABLET ORAL DAILY
Qty: 90 TABLET | Refills: 0 | Status: SHIPPED | OUTPATIENT
Start: 2024-11-26

## 2024-12-05 ENCOUNTER — TELEPHONE (OUTPATIENT)
Dept: FAMILY MEDICINE | Facility: CLINIC | Age: 59
End: 2024-12-05

## 2024-12-05 NOTE — TELEPHONE ENCOUNTER
Serum electrophoresis test results sent over from [facility].   Follow up appt needed to discuss/manage results.

## 2025-01-09 ENCOUNTER — MYC REFILL (OUTPATIENT)
Dept: FAMILY MEDICINE | Facility: CLINIC | Age: 60
End: 2025-01-09

## 2025-01-09 DIAGNOSIS — F41.9 ANXIETY: ICD-10-CM

## 2025-01-12 RX ORDER — HYDROXYZINE HYDROCHLORIDE 25 MG/1
25 TABLET, FILM COATED ORAL PRN
Qty: 20 TABLET | Refills: 0 | OUTPATIENT
Start: 2025-01-12

## 2025-02-28 DIAGNOSIS — F33.0 MILD RECURRENT MAJOR DEPRESSION: ICD-10-CM

## 2025-02-28 DIAGNOSIS — F41.9 ANXIETY: ICD-10-CM

## 2025-03-03 RX ORDER — CITALOPRAM HYDROBROMIDE 40 MG/1
40 TABLET ORAL DAILY
Qty: 90 TABLET | Refills: 0 | Status: SHIPPED | OUTPATIENT
Start: 2025-03-03

## 2025-08-12 ENCOUNTER — LAB (OUTPATIENT)
Dept: LAB | Facility: CLINIC | Age: 60
End: 2025-08-12
Payer: COMMERCIAL

## 2025-08-12 ENCOUNTER — ALLIED HEALTH/NURSE VISIT (OUTPATIENT)
Dept: FAMILY MEDICINE | Facility: CLINIC | Age: 60
End: 2025-08-12
Payer: COMMERCIAL

## 2025-08-12 DIAGNOSIS — Z11.1 SCREENING EXAMINATION FOR PULMONARY TUBERCULOSIS: Primary | ICD-10-CM

## 2025-08-12 DIAGNOSIS — Z11.1 SCREENING EXAMINATION FOR PULMONARY TUBERCULOSIS: ICD-10-CM

## 2025-08-12 PROCEDURE — 36415 COLL VENOUS BLD VENIPUNCTURE: CPT

## 2025-08-12 PROCEDURE — 86481 TB AG RESPONSE T-CELL SUSP: CPT

## 2025-08-12 PROCEDURE — 99207 PR NO CHARGE NURSE ONLY: CPT

## 2025-08-14 LAB
GAMMA INTERFERON BACKGROUND BLD IA-ACNC: 0.07 IU/ML
M TB IFN-G BLD-IMP: NEGATIVE
M TB IFN-G CD4+ BCKGRND COR BLD-ACNC: 9.93 IU/ML
MITOGEN IGNF BCKGRD COR BLD-ACNC: 0 IU/ML
MITOGEN IGNF BCKGRD COR BLD-ACNC: 0 IU/ML
QUANTIFERON MITOGEN: 10 IU/ML
QUANTIFERON NIL TUBE: 0.07 IU/ML
QUANTIFERON TB1 TUBE: 0.07 IU/ML
QUANTIFERON TB2 TUBE: 0.07

## (undated) DEVICE — SU MONOCRYL 3-0 SH 27" UND Y416H

## (undated) DEVICE — GLOVE PROTEXIS MICRO 8.0  2D73PM80

## (undated) DEVICE — GLOVE BIOGEL PI ULTRATOUCH SZ 8.0 41180

## (undated) DEVICE — SU PROLENE 1 CT-1 30" 8425H

## (undated) DEVICE — DRSG KERLIX FLUFFS X5

## (undated) DEVICE — DRAPE BARRIER 25X22" 1089

## (undated) DEVICE — BAG CLEAR TRASH 1.3M 39X33" P4040C

## (undated) DEVICE — PREP POVIDONE-IODINE 7.5% SCRUB 4OZ BOTTLE MDS093945

## (undated) DEVICE — GLOVE BIOGEL PI SZ 7.5 40875

## (undated) DEVICE — TRAY PREP DRY SKIN SCRUB 067

## (undated) DEVICE — SPONGE RAY-TEC 4X8" 7318

## (undated) DEVICE — LINEN ORTHO ACL PACK 5447

## (undated) DEVICE — GLOVE BIOGEL PI MICRO INDICATOR UNDERGLOVE SZ 7.0 48970

## (undated) DEVICE — BONE CLEANING TIP INTERPULSE  0210-010-000

## (undated) DEVICE — TOURNIQUET SGL  BLADDER 30"X4" BLUE 5921030135

## (undated) DEVICE — TUBE CULTURE AEROBIC/ANAEROBIC W/O SWABS A.C.T.I.1. 12401

## (undated) DEVICE — DRAPE XRAY CASSETTE 20X23

## (undated) DEVICE — SET HANDPIECE INTERPULSE W/COAXIAL FAN SPRAY TIP 0210118000

## (undated) DEVICE — DRSG GAUZE 4X4" 3033

## (undated) DEVICE — SUCTION MANIFOLD NEPTUNE 2 SYS 4 PORT 0702-020-000

## (undated) DEVICE — LINEN FULL SHEET 5511

## (undated) DEVICE — Device

## (undated) DEVICE — SU ETHILON 2-0 PS 18" 585H

## (undated) DEVICE — DRILL BIT SYN QUICK COUPLING 2.5X180MM GOLD  310.23

## (undated) DEVICE — DRILL BIT SYN QUICK COUPLING 2.8X165MM 310.288

## (undated) DEVICE — GLOVE BIOGEL PI MICRO INDICATOR UNDERGLOVE SZ 8.0 48980

## (undated) DEVICE — ESU GROUND PAD UNIVERSAL W/O CORD

## (undated) DEVICE — SOL ADH LIQUID BENZOIN SWAB 0.6ML C1544

## (undated) DEVICE — DRAPE STERI TOWEL SM 1000

## (undated) DEVICE — DRAPE SHEET REV FOLD 3/4 9349

## (undated) DEVICE — DRAPE STERI INCISE 24X14" 1040

## (undated) DEVICE — ESU GROUND PAD ADULT W/CORD E7507

## (undated) DEVICE — SU VICRYL+ 0 CT 36" DYED VCP358H

## (undated) DEVICE — SU ETHILON 3-0 PS-2 18" 1669H

## (undated) DEVICE — SU VICRYL 0 CT-1 27" J340H

## (undated) DEVICE — DRAPE IOBAN INCISE 23X17" 6650EZ

## (undated) DEVICE — SUTURE MONOCRYL+ 2-0 CT-1 36" UNDYED MCP945H

## (undated) DEVICE — TUBING SUCTION 12"X1/4" N612

## (undated) DEVICE — PREP SKIN SCRUB TRAY 4461A

## (undated) DEVICE — DRAPE EXTREMITY W/ARMBOARD 29405

## (undated) DEVICE — SU ETHILON 3-0 FS-1 18" 669H

## (undated) DEVICE — APPLICATORS COTTON TIP 6"X2 STERILE LF C15053-006

## (undated) DEVICE — SU VICRYL 2-0 CT-1 27" UND J259H

## (undated) DEVICE — CAST PADDING 4" STERILE 9044S

## (undated) DEVICE — CAST PADDING 6" STERILE 9046S

## (undated) DEVICE — BNDG ELASTIC 6" DBL LENGTH UNSTERILE 6611-16

## (undated) DEVICE — GOWN IMPERVIOUS SPECIALTY XLG/XLONG 32474

## (undated) DEVICE — PACK TOTAL KNEE SOP15TKFSD

## (undated) DEVICE — GLOVE PROTEXIS POWDER FREE 8.0 ORTHOPEDIC 2D73ET80

## (undated) DEVICE — SUCTION IRR SYSTEM W/O TIP INTERPULSE HANDPIECE 0210-100-000

## (undated) DEVICE — DRSG ADAPTIC 3X8" 6113

## (undated) DEVICE — SOL WATER IRRIG 1000ML BOTTLE 2F7114

## (undated) DEVICE — BNDG ELASTIC 6"X5YDS UNSTERILE 6611-60

## (undated) DEVICE — SOL NACL 0.9% IRRIG 3000ML BAG 2B7477

## (undated) DEVICE — DRILL BIT SYN QUICK COUPLING 3.5X110MM 310.35

## (undated) DEVICE — STPL SKIN 35W 6.9MM  PXW35

## (undated) DEVICE — PREP POVIDONE-IODINE 10% SOLUTION 4OZ BOTTLE MDS093944

## (undated) DEVICE — LINEN HALF SHEET 5512

## (undated) DEVICE — CAST PLASTER SPLINT 5X30" 7395

## (undated) DEVICE — PACK LOWER EXTREMITY RIDGES

## (undated) DEVICE — DRILL BIT SYN QUICK COUPLING 2.0X140MM  323.062

## (undated) DEVICE — SYR BULB IRRIG 50ML LATEX FREE 0035280

## (undated) DEVICE — GLOVE BIOGEL PI SZ 7.0 40870

## (undated) DEVICE — GLOVE PROTEXIS W/NEU-THERA 7.5  2D73TE75

## (undated) DEVICE — BNDG ELASTIC 4"X5YDS UNSTERILE 6611-40

## (undated) DEVICE — DRSG XEROFORM 1X8"

## (undated) DEVICE — LINEN TOWEL PACK X5 5464

## (undated) DEVICE — SU VICRYL 2-0 CT-1 27" J339H

## (undated) DEVICE — SU ETHILON 2-0 FS 18" 664H

## (undated) DEVICE — DRSG ABDOMINAL 07 1/2X8" 7197D

## (undated) RX ORDER — HYDROMORPHONE HYDROCHLORIDE 1 MG/ML
INJECTION, SOLUTION INTRAMUSCULAR; INTRAVENOUS; SUBCUTANEOUS
Status: DISPENSED
Start: 2022-09-29

## (undated) RX ORDER — FENTANYL CITRATE 50 UG/ML
INJECTION, SOLUTION INTRAMUSCULAR; INTRAVENOUS
Status: DISPENSED
Start: 2023-03-13

## (undated) RX ORDER — LIDOCAINE HYDROCHLORIDE 10 MG/ML
INJECTION, SOLUTION EPIDURAL; INFILTRATION; INTRACAUDAL; PERINEURAL
Status: DISPENSED
Start: 2022-12-15

## (undated) RX ORDER — GLYCOPYRROLATE 0.2 MG/ML
INJECTION INTRAMUSCULAR; INTRAVENOUS
Status: DISPENSED
Start: 2023-03-13

## (undated) RX ORDER — FENTANYL CITRATE 50 UG/ML
INJECTION, SOLUTION INTRAMUSCULAR; INTRAVENOUS
Status: DISPENSED
Start: 2022-09-29

## (undated) RX ORDER — LIDOCAINE HYDROCHLORIDE 10 MG/ML
INJECTION, SOLUTION EPIDURAL; INFILTRATION; INTRACAUDAL; PERINEURAL
Status: DISPENSED
Start: 2023-03-13

## (undated) RX ORDER — DEXAMETHASONE SODIUM PHOSPHATE 4 MG/ML
INJECTION, SOLUTION INTRA-ARTICULAR; INTRALESIONAL; INTRAMUSCULAR; INTRAVENOUS; SOFT TISSUE
Status: DISPENSED
Start: 2023-03-13

## (undated) RX ORDER — DEXAMETHASONE SODIUM PHOSPHATE 4 MG/ML
INJECTION, SOLUTION INTRA-ARTICULAR; INTRALESIONAL; INTRAMUSCULAR; INTRAVENOUS; SOFT TISSUE
Status: DISPENSED
Start: 2022-09-29

## (undated) RX ORDER — PROPOFOL 10 MG/ML
INJECTION, EMULSION INTRAVENOUS
Status: DISPENSED
Start: 2022-12-15

## (undated) RX ORDER — ACETAMINOPHEN 325 MG/1
TABLET ORAL
Status: DISPENSED
Start: 2023-03-13

## (undated) RX ORDER — NEOSTIGMINE METHYLSULFATE 1 MG/ML
VIAL (ML) INJECTION
Status: DISPENSED
Start: 2022-09-29

## (undated) RX ORDER — KETOROLAC TROMETHAMINE 30 MG/ML
INJECTION, SOLUTION INTRAMUSCULAR; INTRAVENOUS
Status: DISPENSED
Start: 2022-12-15

## (undated) RX ORDER — ONDANSETRON 2 MG/ML
INJECTION INTRAMUSCULAR; INTRAVENOUS
Status: DISPENSED
Start: 2022-12-15

## (undated) RX ORDER — FENTANYL CITRATE 50 UG/ML
INJECTION, SOLUTION INTRAMUSCULAR; INTRAVENOUS
Status: DISPENSED
Start: 2022-12-15

## (undated) RX ORDER — ONDANSETRON 2 MG/ML
INJECTION INTRAMUSCULAR; INTRAVENOUS
Status: DISPENSED
Start: 2022-09-29

## (undated) RX ORDER — CEFAZOLIN SODIUM/WATER 2 G/20 ML
SYRINGE (ML) INTRAVENOUS
Status: DISPENSED
Start: 2023-03-13

## (undated) RX ORDER — FENTANYL CITRATE 0.05 MG/ML
INJECTION, SOLUTION INTRAMUSCULAR; INTRAVENOUS
Status: DISPENSED
Start: 2022-09-29

## (undated) RX ORDER — ACETAMINOPHEN 325 MG/1
TABLET ORAL
Status: DISPENSED
Start: 2022-12-15

## (undated) RX ORDER — CEFAZOLIN SODIUM/WATER 2 G/20 ML
SYRINGE (ML) INTRAVENOUS
Status: DISPENSED
Start: 2022-12-15

## (undated) RX ORDER — PROPOFOL 10 MG/ML
INJECTION, EMULSION INTRAVENOUS
Status: DISPENSED
Start: 2022-09-29

## (undated) RX ORDER — VECURONIUM BROMIDE 1 MG/ML
INJECTION, POWDER, LYOPHILIZED, FOR SOLUTION INTRAVENOUS
Status: DISPENSED
Start: 2022-09-29

## (undated) RX ORDER — NEOSTIGMINE METHYLSULFATE 1 MG/ML
VIAL (ML) INJECTION
Status: DISPENSED
Start: 2022-12-15

## (undated) RX ORDER — GLYCOPYRROLATE 0.2 MG/ML
INJECTION INTRAMUSCULAR; INTRAVENOUS
Status: DISPENSED
Start: 2022-12-15

## (undated) RX ORDER — LIDOCAINE HYDROCHLORIDE 20 MG/ML
INJECTION, SOLUTION EPIDURAL; INFILTRATION; INTRACAUDAL; PERINEURAL
Status: DISPENSED
Start: 2022-09-29

## (undated) RX ORDER — BUPIVACAINE HYDROCHLORIDE 2.5 MG/ML
INJECTION, SOLUTION EPIDURAL; INFILTRATION; INTRACAUDAL
Status: DISPENSED
Start: 2023-03-13

## (undated) RX ORDER — HYDROCODONE BITARTRATE AND ACETAMINOPHEN 5; 325 MG/1; MG/1
TABLET ORAL
Status: DISPENSED
Start: 2023-03-13

## (undated) RX ORDER — KETOROLAC TROMETHAMINE 30 MG/ML
INJECTION, SOLUTION INTRAMUSCULAR; INTRAVENOUS
Status: DISPENSED
Start: 2023-03-13

## (undated) RX ORDER — PROPOFOL 10 MG/ML
INJECTION, EMULSION INTRAVENOUS
Status: DISPENSED
Start: 2023-03-13

## (undated) RX ORDER — CEFAZOLIN SODIUM/WATER 2 G/20 ML
SYRINGE (ML) INTRAVENOUS
Status: DISPENSED
Start: 2022-09-29

## (undated) RX ORDER — DEXAMETHASONE SODIUM PHOSPHATE 4 MG/ML
INJECTION, SOLUTION INTRA-ARTICULAR; INTRALESIONAL; INTRAMUSCULAR; INTRAVENOUS; SOFT TISSUE
Status: DISPENSED
Start: 2022-12-15

## (undated) RX ORDER — OXYCODONE HYDROCHLORIDE 5 MG/1
TABLET ORAL
Status: DISPENSED
Start: 2023-03-13

## (undated) RX ORDER — HYDROMORPHONE HCL IN WATER/PF 6 MG/30 ML
PATIENT CONTROLLED ANALGESIA SYRINGE INTRAVENOUS
Status: DISPENSED
Start: 2022-12-15

## (undated) RX ORDER — TRANEXAMIC ACID 10 MG/ML
INJECTION, SOLUTION INTRAVENOUS
Status: DISPENSED
Start: 2023-03-13

## (undated) RX ORDER — FENTANYL CITRATE-0.9 % NACL/PF 10 MCG/ML
PLASTIC BAG, INJECTION (ML) INTRAVENOUS
Status: DISPENSED
Start: 2022-12-15